# Patient Record
Sex: MALE | Race: WHITE | NOT HISPANIC OR LATINO | Employment: UNEMPLOYED | URBAN - METROPOLITAN AREA
[De-identification: names, ages, dates, MRNs, and addresses within clinical notes are randomized per-mention and may not be internally consistent; named-entity substitution may affect disease eponyms.]

---

## 2018-10-15 ENCOUNTER — TRANSFERRED RECORDS (OUTPATIENT)
Dept: HEALTH INFORMATION MANAGEMENT | Facility: CLINIC | Age: 28
End: 2018-10-15

## 2018-10-15 ENCOUNTER — TELEPHONE (OUTPATIENT)
Dept: BEHAVIORAL HEALTH | Facility: CLINIC | Age: 28
End: 2018-10-15

## 2018-10-16 ENCOUNTER — HOSPITAL ENCOUNTER (INPATIENT)
Facility: CLINIC | Age: 28
LOS: 3 days | Discharge: HOME | End: 2018-10-19
Attending: PSYCHIATRY & NEUROLOGY | Admitting: PSYCHIATRY & NEUROLOGY
Payer: COMMERCIAL

## 2018-10-16 DIAGNOSIS — F33.41 RECURRENT MAJOR DEPRESSIVE DISORDER, IN PARTIAL REMISSION (H): Primary | ICD-10-CM

## 2018-10-16 PROCEDURE — 12400007 ZZH R&B MH INTERMEDIATE UMMC

## 2018-10-16 RX ORDER — OLANZAPINE 10 MG/1
10 TABLET ORAL
Status: DISCONTINUED | OUTPATIENT
Start: 2018-10-16 | End: 2018-10-19 | Stop reason: HOSPADM

## 2018-10-16 RX ORDER — TRAZODONE HYDROCHLORIDE 50 MG/1
50 TABLET, FILM COATED ORAL
Status: DISCONTINUED | OUTPATIENT
Start: 2018-10-16 | End: 2018-10-19 | Stop reason: HOSPADM

## 2018-10-16 RX ORDER — HYDROXYZINE HYDROCHLORIDE 25 MG/1
25 TABLET, FILM COATED ORAL EVERY 4 HOURS PRN
Status: DISCONTINUED | OUTPATIENT
Start: 2018-10-16 | End: 2018-10-19 | Stop reason: HOSPADM

## 2018-10-16 RX ORDER — ACETAMINOPHEN 325 MG/1
650 TABLET ORAL EVERY 4 HOURS PRN
Status: DISCONTINUED | OUTPATIENT
Start: 2018-10-16 | End: 2018-10-19 | Stop reason: HOSPADM

## 2018-10-16 RX ORDER — POLYETHYLENE GLYCOL 3350 17 G
2-4 POWDER IN PACKET (EA) ORAL
Status: DISCONTINUED | OUTPATIENT
Start: 2018-10-16 | End: 2018-10-19 | Stop reason: HOSPADM

## 2018-10-16 RX ORDER — OLANZAPINE 10 MG/2ML
10 INJECTION, POWDER, FOR SOLUTION INTRAMUSCULAR
Status: DISCONTINUED | OUTPATIENT
Start: 2018-10-16 | End: 2018-10-19 | Stop reason: HOSPADM

## 2018-10-16 NOTE — IP AVS SNAPSHOT
[address]    [facility] [address]    Phone:  [phone]                                       After Visit Summary   10/16/2018    Terrance Fletcher    MRN: [id number]           After Visit Summary Signature Page     I have received my discharge instructions, and my questions have been answered. I have discussed any challenges I see with this plan with the nurse or doctor.    ..........................................................................................................................................  Patient/Patient Representative Signature      ..........................................................................................................................................  Patient Representative Print Name and Relationship to Patient    ..................................................               ................................................  Date                                   Time    ..........................................................................................................................................  Reviewed by Signature/Title    ...................................................              ..............................................  Date                                               Time          22EPIC Rev 08/18

## 2018-10-16 NOTE — TELEPHONE ENCOUNTER
S: pt is a 28 yr old male in [facility] ED for psychosis [phone]    B: Pt required Zyprexa in ED 10 mg.  Mom reports pt ha made suicidal statements and has been verbally aggressive.  Pt reports he was discharged from detox 2 days ago for meth use.  Mother reports SI and HI.  Last reported suicide attempt was June 2018 by CO2.  Pt reports he is paranoid and the [place] is after him. Pt reports he can see them and hear them.  Pt reports he is hearing car horns and clicking noises.  Pt reports thoughts of harming his wife.  Pt reports smelling awful smells.      A: health officer hold    R: pt waiting in ED for appropriate placement

## 2018-10-16 NOTE — IP AVS SNAPSHOT
MRN:[id number]                      After Visit Summary   10/16/2018    Terrance Fletcher    MRN: [id number]           Thank you!     Thank you for choosing [facility] for your care. Our goal is always to provide you with excellent care.        Patient Information     Date Of Birth          1990        About your hospital stay     You were admitted on:  October 16, 2018 You last received care in the:  [facility]    You were discharged on:  October 19, 2018       Who to Call     For medical emergencies, please call 911.  For non-urgent questions about your medical care, please call your primary care provider or clinic, None          Attending Provider     Provider Specialty    Tomer Mukherjee MD Psychiatry       Primary Care Provider Fax #    Physician No Ref-Primary [phone]      Your next 10 appointments already scheduled     Oct 23, 2018  9:40 AM CDT   Office Visit with [facility]VIRA [facility] [place] [place]    [address] [place] [address] [phone]           Bring a current list of meds and any records pertaining to this visit. For Physicals, please bring immunization records and any forms needing to be filled out. Please arrive 10 minutes early to complete paperwork.              Further instructions from your care team        Behavioral Discharge Planning and Instructions    Summary:  You were admitted on 10/16/2018 due to Psychotic Symptomology and Chemical Use Issues.  You were treated by Dr. Tomer Mukherjee MD and discharged on 10/19/18 from [facility] to home. You declined the need to stay hospitalized and have case management assist you with outpatient CD treatment, referrals to several locations are listed below. Please make your recovery a priority. You have been set up with a primary care to manage your medication.    Principal Diagnosis:   Psychosis, likely substance induced  Polysubstance use  disorder (methamphetamines, alcohol, cannabis)    Primary Provider for Medication Management: Tuesday 10/23/18 9:40am  [facility]  Larissa Goodwin  [address]  [facility] [address]  [phone]    Attend all scheduled appointments with your outpatient providers. Call at least 24 hours in advance if you need to reschedule an appointment to ensure continued access to your outpatient providers.     Outpatient CD Treatment Programs:  -[facility] in [place] (Immediate openings)  [phone]     -[facility] in [place]  [phone]    -[facility] in [place]  [phone]    -[facility] in [place]  [phone]    Major Treatments, Procedures and Findings:  You were provided with: a psychiatric assessment, assessed for medical stability, group therapy and milieu management    Symptoms to Report: Feeling more aggressive, increased confusion, losing more sleep, mood getting worse or thoughts of suicide    Early warning signs can include: Increased depression or anxiety sleep disturbances increased thoughts or behaviors of suicide or self-harm  increased unusual thinking, such as paranoia or hearing voices    Safety and Wellness:  Take all medicines as directed.  Make no changes unless your doctor suggests them.  Follow treatment recommendations.  Refrain from alcohol and non-prescribed drugs.  Items could include:  {Safety and Wellness Adult Senior:753953} If there is a concern for safety, call 911.    Resources:   Crisis Intervention: 863.303.9006 or 587-132-1353 (TTY: 200.392.8463).  Call anytime for help.  National [place] on Mental Illness (www.mn.sung.org): 717.490.3449 or 223-107-2983.  [place] Association for Children's Mental Health (www.macmh.org): 824.138.6433.  National Suicide Prevention Line (www.mentalhealthmn.org): 168-755-HGDJ (0984)  Mental Health Consumer/Survivor Network of [place] (www.mhcsn.net): 216.755.2314 or 907-205-0464  Mental Health Association of [place]  "(www.mentalhealth.org): 113.852.2924 or 239-630-7371    The treatment team has appreciated the opportunity to work with you.     If you have any questions or concerns our unit number is [phone]  You may be receiving a follow-up phone call within the next three days from a representative from behavioral health.          Pending Results     No orders found from 10/14/2018 to 10/17/2018.            Admission Information     Date & Time Provider Department Dept. Phone    10/16/2018 Tomer Mukherjee MD [facility] [phone]      Your Vitals Were     Blood Pressure Pulse Temperature Respirations Pulse Oximetry       141/87 (BP Location: Left arm) 80 98.2  F (36.8  C) (Oral) 16 96%       MyChart Information     [facility] lets you send messages to your doctor, view your test results, renew your prescriptions, schedule appointments and more. To sign up, go to [email or web address] . Click on \"Log in\" on the left side of the screen, which will take you to the Welcome page. Then click on \"Sign up Now\" on the right side of the page.     You will be asked to enter the access code listed below, as well as some personal information. Please follow the directions to create your username and password.     Your access code is: [email or web address]  Expires: 2019 12:11 PM     Your access code will  in 90 days. If you need help or a new code, please call your [facility] clinic or [phone].        Care EveryWhere ID     This is your Care EveryWhere ID. This could be used by other organizations to access your [facility] medical records  [id number]        Equal Access to Services     MANUEL CASILLAS : Kateryna Francois, celeste johnson, smooth valerio. So [facility] [phone].    ATENCIÓN: Si habla español, tiene a alvarez disposición servicios gratuitos de asistencia lingüística. Llame al [phone].    We comply with applicable federal civil rights laws and [place] " laws. We do not discriminate on the basis of race, color, national origin, age, disability, sex, sexual orientation, or gender identity.               Review of your medicines      START taking        Dose / Directions    mirtazapine 15 MG tablet   Commonly known as:  REMERON   Used for:  Recurrent major depressive disorder, in partial remission (H)        Dose:  15 mg   Take 1 tablet (15 mg) by mouth At Bedtime   Quantity:  30 tablet   Refills:  0            Where to get your medicines      These medications were sent to [facility] [place], [place] - [address]  [address] [facility] [address], [facility] [address]     Phone:  [phone]     mirtazapine 15 MG tablet                Protect others around you: Learn how to safely use, store and throw away your medicines at www.disposemymeds.org.             Medication List: This is a list of all your medications and when to take them. Check marks below indicate your daily home schedule. Keep this list as a reference.      Medications           Morning Afternoon Evening Bedtime As Needed    mirtazapine 15 MG tablet   Commonly known as:  REMERON   Take 1 tablet (15 mg) by mouth At Bedtime

## 2018-10-16 NOTE — PROGRESS NOTES
10/16/18 7665   Patient Belongings   Did you bring any home meds/supplements to the hospital?  No   Patient Belongings clothing   Disposition of Belongings Other (see comment)   Belongings Search Yes   Clothing Search Yes   Second Staff Lior      Patient locker-Pants (green), long sleeve shirt (green), short sleeve shirt (red), glasses, cigarettes, cell phone.     Security-[employer] Mastercard, [employer] 6265 Mastercard, $74 dollars.    A               Admission:  I am responsible for any personal items that are not sent to the safe or pharmacy.  [facility] is not responsible for loss, theft or damage of any property in my possession.    Signature:  _________________________________ Date: _______  Time: _____                                              Staff Signature:  ____________________________ Date: ________  Time: _____      2nd Staff person, if patient is unable/unwilling to sign:    Signature: ________________________________ Date: ________  Time: _____     Discharge:  [facility] has returned all of my personal belongings:    Signature: _________________________________ Date: ________  Time: _____                                          Staff Signature:  ____________________________ Date: ________  Time: _____

## 2018-10-16 NOTE — TELEPHONE ENCOUNTER
#20/ely accepted for himself               Ely requests pt be given zyprexa before he transfers; author left vm msg for [facility] staff to call intake back (no privates on #22)     Pt is on a Health officer hold; per Ely he is okay with this and if an emergency admit hold is needed, he said he or the residents can handle this; copy of health officer hold and clinical faxed to #20; per ely, he said he wants pt to get zyprexa before he transfers ONLY IF HE GETS AGITATED RE: THE TRANSFER, since he was last given zyprexa at 9:54 am per [facility] Rn. joe

## 2018-10-16 NOTE — H&P
"History and Physical    Terrance Fletcher MRN# [id number]   Age: 28 year old YOB: 1990     Date of Admission:  10/16/18        Primary Outpatient Psychiatrist: Unknown, per chart had \"started seeing a doctor for depression\"  Primary Physician:  No Ref-Primary, Physician  Therapist: Unknown  [facility] : None  Family Members: Cee (mom) - [phone]             Chief Complaint:   \"Sleep\"         History of Present Illness:   History obtained from patient interview, chart review.  Pt interviewed on 10/16/18 at approximately 5PM.    This patient is a 28 year old male with polysubstance use disorder (cannabis, methamphetamines), depression, and anxiety who presented to [facility] ED on 10/15/2018 with psychosis, paranoid thoughts, AH, VH, SI, and HI (possible thoughts of harming his wife) in the context of recently discharging from detox for methamphetamine use and recent methamphetamine use.    He was medically cleared for admission to inpatient psychiatric unit.    Per ED report: Pt presented to [facility] ED w/his mother due to increasing depression, intermittent SI, intermittent HI, and AH/VH in the context of discharging from detox 2 days ago for meth use. Pt reported last SI was a few days ago. Per mother, pt has made suicidal statements, such as \"he doesn't want to be here\" and \"this world is not for him.\" She also reports that the pt has been \"running around int he wood w/a crossbow, chasing people who are not there.\" Pt was paranoid and reported that people have been following him, and claims that this wife hired private investigators, and that the [place] is after him. He also reported HI (possibly of harming wife?), but wouldn't elaborate, other than that he was angry w/his wife after she was unfaithful. Pt also reported AH of car horns and clicking noises, as well as smelling awful smells. Pt reported daily meth use since June 2018 prior to detox, as well as heavy cannabis use. Pt " reported he was previously on Lexapro, anxiety medications, and Seroquel, but stopped them 2/2 meth use. The pt is currently in the process of a divorce, and his wedding anniversary was 10/16. He is also currently unable to see his son. Pt did require 10mg of Zyprexa in ED.      Per patient report:  Pt was interviewed in his room. He was sleeping in bed and said he just wanted to sleep and didn't want to answer questions. He did answer a few questions before ending the interview. He had been having AH primarily for the past week, only in the context of methamphetamine use. He has never had symptoms like this before. He last used meth 5 days ago, and he reports his last AH were 4-5 days ago. He reports he doesn't take any medications. He did not have any other physical concerns.    Per chart review: Recently seen in [facility] ED (10/11/18) in meth withdrawal. Reported that he started using 1 gram of meth per day after he went through a divorce and sold his home this past June. He was recently hospitalized for a suicide attempt (May 2018), and had started seeing a doctor for depression, but had been unable to keep his appointments and was off of his PTA medications. He was then transferred to [facility]. Pt has had two prior psychiatric hospitalizations at [facility]: 2011 for SI after relapse on substances; 2006 (was 16 y/o) for depressive symptoms, irritability, and outbursts.     The risks, benefits, alternatives and side effects have been discussed and are understood by the patient and other caregivers.       Psychiatric Review of Systems:   Depressive Sx: Patient declined to answer. SI per report.  Manic Sx: Patient declined to answer.  Psychosis: AH for past week. VH and paranoia per report.  Anxiety Sx: Patient declined to answer.  PTSD:Patient declined to answer.  ADHD: Patient declined to answer.  Antisocial: Patient declined to answer.  ASD: Patient declined to answer.  ED: Patient declined to answer.  Cluster  "B: Patient declined to answer.         Medical Review of Systems:   The Review of Systems is negative other than noted in the HPI         Psychiatric History:     Prior diagnoses: MDD, anxiety, methamphetamine dependence, alcohol dependence, Adjustment disorder with mixed emotions, Depressive disorder NOS, Anxiety disorder NOS, Substance abuse NOS, ADHD    Hospitalizations: May/June 2018 after suicide attempt, 2011 for SI after relapse on substances, 2006     Suicide attempts: Last reported suicide attempt was May/June 2018 by CO2    Self-injurious behavior: In remote past (per chart ) - cutting, hitting himself when angry    Violence: Per chart, hx of aggression    ECT/TMS: None per chart    Past medications: Celexa, Risperdal, trazodone, Ritalin, Concerta, Adderall, Strattera.  Most recently on \"Lexapro, anxiety medications, and Seroquel\" but stopped them 2/2 meth use.         Substance Use History:     Nicotine: Smokes 1/2 ppd.    Alcohol: Did not quantify use, last drink was \"awhile ago.\" Denies hx of alcohol withdrawal.     Cannabis: Current heavy use per chart.    Others: Methamphetamines - daily use since June 2018. Last use 4-5 days ago.    Prior CD treatments: Recently discharged from detox. 5 completed CD treatments, with the last in 2011.         Past Medical History:     Past Medical History:   Diagnosis Date     Poison ivy 3 wk ago     Substance abuse      Past Surgical History:   Procedure Laterality Date     COSMETIC SURGERY      to face   Psoriasis  Unknown is he has a history of seizures or head trauma.       Allergies:    No Known Allergies       Medications:   Pt is not on any PTA medications.          Social History:     Upbringing: Raised by mother and step-father, has three younger siblings.  Best friend committed suicide 10/2006 - patient was 16 yo, met his father for the first time at age 18.    Family/Relationships: Currently in the process of a divorce. Has a son - currently unable to see, " per report.    Living Situation: Unknown. Recently sold his house.    Education: HS grad per chart review.    Occupation: Unknown.    Legal: None reported in chart.    Abuse/Trauma: Unknown. Patient declined majority of interview.    : Unknown. Patient declined majority of interview.    Spirituality: Unknown. Patient declined majority of interview.         Family History:   Per chart review:  - Father: Alcohol use disorder  - Younger brother: ADHD  - Brother: Asperger's (autism)    Family History   Problem Relation Age of Onset     Diabetes Mother      type 1     Cancer - colorectal Father 40     onset in 40th            Labs:     No results found for this or any previous visit (from the past 24 hour(s)).         Psychiatric Examination:     There were no vitals taken for this visit.    Appearance:  poorly groomed, laying in bed, tattoos on arms  Attitude:  uncooperative  Eye Contact:  Eyes closed  Mood:  Appears tired and frustrated  Affect:  constricted mobility  Speech:  clear, coherent  Psychomotor Behavior:  no evidence of tardive dyskinesia, dystonia, or tics  Thought Process: Logical answers to questions.   Associations:  no loose associations  Thought Content: No current reported SI. Pt reports AH 4-5 days ago. Did not appear to be responding to internal stimuli.  Insight:  fair  Judgment:  limited  Oriented to: not formally assessed  Attention Span and Concentration:  intact  Recent and Remote Memory:  intact  Language:  english with appropriate syntax and vocabulary  Fund of Knowledge: appropriate  Muscle Strength and Tone: unable to assess, pt laying in bed  Gait and Station: unable to assess, pt laying in bed         Physical Exam:     Pt did not want to complete interview or physical exam with resident. Pt was laying in bed and did not appear to be in acute distress.    [facility] ED physical exam by Dr. Landon Coles on 10/15/2018 (in paper chart), copied below.  T: 98.1F, Pulse: 110, Respirations:  20, BP: 124/80, SpO2: 100%  Constitutional: Pt sitting up in bed, in no acute distress. The pt is lying in bed with his head covered by a pillow.  Head: Normocephalic. Atraumatic.  Eyes: Extra-ocular movements intact. Normal conjunctiva.  Oropharynx: Moist mucous membranes.  Neck: Supple.  CV: RRR. No murmurs appreciated.  Pulm: Easy work of breathing. Lungs are clear to auscultation bilaterally.  Abdomen: Soft, non-distended, non-tender.  MSK: No deformities of the extremities.  Skin: Warm, dry.   Neuro: Alert and interactive.  Psych: The pt is generally cooperative. Endorses remote SI. Appears depressed. Reports homicidal ideations, but will not elaborate. He believes that detectives are following him.          Assessment   This patient is a 28 year old male with a history of polysubstance use disorder (cannabis, methamphetamines), depression, and anxiety who presented to [facility] ED on 10/15/2018 with paranoid thoughts, AH, VH, SI, and HI (possible thoughts of harming his wife) in the context of recently discharging from detox for methamphetamine use and recent methamphetamine and cannabis use. Psychosocial stressors include divorce, family issues, and substance use, which he has been coping with by using cannabis and methamphetamines. Family history was notable for AUD in his father and siblings with ADHD and autism. MSE was notable for an uncooperative pt who reports last AH 4-5 days ago, only in the context of methamphetamine use.  AH, VH, and paranoia in the context of methamphetamine use is consistent with a diagnosis of substance induced psychosis. Additional collateral information and further interview when pt is agreeable will be important to assess presence of possible co-morbid mood disorders.    Given that patient was acutely psychotic w/AH, SI, and HI on presentation to the ED, he warrants inpatient hospitalization. Disposition pending clinical stabilization, medication optimization, and formulation  of safe discharge plan.          Plan   Admit to Unit [facility] with Attending Physician Dr. Mukherjee  Legal Status: Voluntary    Safety Assessment:      Pt has not required locked seclusion or restraints in the past 24 hours to maintain safety, please refer to RN documentation for further details.    Precautions: Suicide, Assault    Principal psychiatric diagnosis:   # Psychosis, likely substance induced    Secondary psychiatric diagnoses:   # Polysubstance use disorder (methamphetamines, alcohol, cannabis)    Medications:   Outpatient medications held:  None, pt not on PTA medications    Outpatient medications continued: None, pt not on PTA medications    New medications initiated:   - IM/PO Olanzapine 10mg Q2H PRN aggression/agitation  - hydroxyzine 25mg q4h prn for anxiety  - trazodone 50mg at bedtime prn for sleep    - Patient will be treated in therapeutic milieu with appropriate individual and group therapies.  - medications as above    Medical diagnoses:      #Hx of GERD: Not currently reporting symptoms.     #Hx of Psoriasis: Not currently reporting symptoms.    #Tobacco use:   - Nicotine replacement    Consult: None    Labs:  CBC w/diff, TSH, UA, CMP, folate, B12 - to be collected    OSH Labs:  Utox: negative  CBC w/diff: WBC 12.8 (H), otherwise wnl (RBC 5.69, Hgb 16.5, Plt 278)  BMP: CO2 34 (H), otherwise wnl (Cr 0.97)  EtOH: wnl         Dispo: unknown pending medication management and clinical stabilization    -------------------------------------------------------  Maddie Elliott MD  PGY-1 Resident  Pager: [phone]      Attestation:  10/17/18  Please see progress note 10/17/18.  Tomer Mukherjee MD

## 2018-10-17 LAB
ALBUMIN SERPL-MCNC: 3.6 G/DL (ref 3.4–5)
ALBUMIN UR-MCNC: NEGATIVE MG/DL
ALP SERPL-CCNC: 81 U/L (ref 40–150)
ALT SERPL W P-5'-P-CCNC: 31 U/L (ref 0–70)
APPEARANCE UR: CLEAR
AST SERPL W P-5'-P-CCNC: 14 U/L (ref 0–45)
BASOPHILS # BLD AUTO: 0.1 10E9/L (ref 0–0.2)
BASOPHILS NFR BLD AUTO: 0.8 %
BILIRUB DIRECT SERPL-MCNC: <0.1 MG/DL (ref 0–0.2)
BILIRUB SERPL-MCNC: 0.3 MG/DL (ref 0.2–1.3)
BILIRUB UR QL STRIP: NEGATIVE
COLOR UR AUTO: NORMAL
DIFFERENTIAL METHOD BLD: NORMAL
EOSINOPHIL # BLD AUTO: 0.4 10E9/L (ref 0–0.7)
EOSINOPHIL NFR BLD AUTO: 3.9 %
ERYTHROCYTE [DISTWIDTH] IN BLOOD BY AUTOMATED COUNT: 13.8 % (ref 10–15)
FOLATE SERPL-MCNC: 7.8 NG/ML
GLUCOSE UR STRIP-MCNC: NEGATIVE MG/DL
HCT VFR BLD AUTO: 49.7 % (ref 40–53)
HGB BLD-MCNC: 16 G/DL (ref 13.3–17.7)
HGB UR QL STRIP: NEGATIVE
IMM GRANULOCYTES # BLD: 0.1 10E9/L (ref 0–0.4)
IMM GRANULOCYTES NFR BLD: 1.2 %
KETONES UR STRIP-MCNC: NEGATIVE MG/DL
LEUKOCYTE ESTERASE UR QL STRIP: NEGATIVE
LYMPHOCYTES # BLD AUTO: 2.2 10E9/L (ref 0.8–5.3)
LYMPHOCYTES NFR BLD AUTO: 23.2 %
MCH RBC QN AUTO: 27.8 PG (ref 26.5–33)
MCHC RBC AUTO-ENTMCNC: 32.2 G/DL (ref 31.5–36.5)
MCV RBC AUTO: 86 FL (ref 78–100)
MONOCYTES # BLD AUTO: 0.7 10E9/L (ref 0–1.3)
MONOCYTES NFR BLD AUTO: 7.3 %
NEUTROPHILS # BLD AUTO: 6.1 10E9/L (ref 1.6–8.3)
NEUTROPHILS NFR BLD AUTO: 63.6 %
NITRATE UR QL: NEGATIVE
NRBC # BLD AUTO: 0 10*3/UL
NRBC BLD AUTO-RTO: 0 /100
PH UR STRIP: 6.5 PH (ref 5–7)
PLATELET # BLD AUTO: 250 10E9/L (ref 150–450)
PROT SERPL-MCNC: 7 G/DL (ref 6.8–8.8)
RBC # BLD AUTO: 5.75 10E12/L (ref 4.4–5.9)
SOURCE: NORMAL
SP GR UR STRIP: 1.02 (ref 1–1.03)
TSH SERPL DL<=0.005 MIU/L-ACNC: 1.16 MU/L (ref 0.4–4)
UROBILINOGEN UR STRIP-MCNC: NORMAL MG/DL (ref 0–2)
VIT B12 SERPL-MCNC: 422 PG/ML (ref 193–986)
WBC # BLD AUTO: 9.6 10E9/L (ref 4–11)

## 2018-10-17 PROCEDURE — 80076 HEPATIC FUNCTION PANEL: CPT | Performed by: PSYCHIATRY & NEUROLOGY

## 2018-10-17 PROCEDURE — 85025 COMPLETE CBC W/AUTO DIFF WBC: CPT | Performed by: PSYCHIATRY & NEUROLOGY

## 2018-10-17 PROCEDURE — 82746 ASSAY OF FOLIC ACID SERUM: CPT | Performed by: PSYCHIATRY & NEUROLOGY

## 2018-10-17 PROCEDURE — 36415 COLL VENOUS BLD VENIPUNCTURE: CPT | Performed by: PSYCHIATRY & NEUROLOGY

## 2018-10-17 PROCEDURE — 81003 URINALYSIS AUTO W/O SCOPE: CPT | Performed by: PSYCHIATRY & NEUROLOGY

## 2018-10-17 PROCEDURE — 82607 VITAMIN B-12: CPT | Performed by: PSYCHIATRY & NEUROLOGY

## 2018-10-17 PROCEDURE — 99222 1ST HOSP IP/OBS MODERATE 55: CPT | Mod: AI | Performed by: PSYCHIATRY & NEUROLOGY

## 2018-10-17 PROCEDURE — 84443 ASSAY THYROID STIM HORMONE: CPT | Performed by: PSYCHIATRY & NEUROLOGY

## 2018-10-17 PROCEDURE — 12400007 ZZH R&B MH INTERMEDIATE UMMC

## 2018-10-17 PROCEDURE — 25000132 ZZH RX MED GY IP 250 OP 250 PS 637

## 2018-10-17 RX ADMIN — NICOTINE POLACRILEX 2 MG: 2 LOZENGE ORAL at 17:17

## 2018-10-17 ASSESSMENT — ACTIVITIES OF DAILY LIVING (ADL)
LAUNDRY: WITH SUPERVISION
ORAL_HYGIENE: INDEPENDENT
GROOMING: INDEPENDENT
GROOMING: INDEPENDENT
ORAL_HYGIENE: INDEPENDENT
DRESS: INDEPENDENT
DRESS: INDEPENDENT

## 2018-10-17 NOTE — PLAN OF CARE
Problem: Patient Care Overview  Goal: Individualization & Mutuality  Patient's goals:  Unable to participate    Plan for admission:  1. Stabilization of mood disorder symptoms  2. Absence of SI- safe with self  3. Medication management per MD's  4. Safe withdrawal from substances complete  5. Coordination of care with outpatient providers, family  6. CD assessment complete  7. Psychiatric follow up care in place

## 2018-10-17 NOTE — PLAN OF CARE
Problem: Patient Care Overview  Goal: Team Discussion  Team Plan:   BEHAVIORAL TEAM DISCUSSION    Participants:   Dr. Mukherjee, Dr. Elliott, Dr. Dorsey, Claudine Goncalves RN, Jesika Timmons MA.LP, Med students    Continued Stay Criteria/Rationale:   Acute psychosis,  SI/HI    Medical/Physical:   H/O GERD, Psoriasis    Precautions:   Behavioral Orders   Procedures     Assault precautions     Code 1 - Restrict to Unit     Routine Programming     As clinically indicated     Status 15     Every 15 minutes.     Suicide precautions     Patients on Suicide Precautions should have a Combination Diet ordered that includes a Diet selection(s) AND a Behavioral Tray selection for Safe Tray - with utensils, or Safe Tray - NO utensils       Plan:  Patient will have ongoing psychiatric assessment.  Medications will be reviewed and adjusted per MD as indicated.  Family  will be contacted for collateral/ care coordination.  Patient will be offered assistance in arranging CD treatment if interested.  Hospital staff will provide a safe environment and a therapeutic milieu. Patient will be encouraged to participate in unit groups and activities.   CTC will continue to assess needs and  ensure appropriate follow up care is in place.       Rationale for change in precautions or plan:   No change in plan/precautions at this time

## 2018-10-17 NOTE — PROGRESS NOTES
Pt was brought in from [facility] ED for psychosis. Per report pt was making suicidal statements and was verbally aggressive. Pt was discharged from detox  2 days ago for meth use. Per report pt attempted suicide in the past (June 2018) by CO2. When pt arrived in the unit pt presented with flat blunted affect. Pt wanted to go to bed immediately after search. Writer attempted to check in with pt but pt said he wanted to sleep and will do the admission paperwork tomorrow. Pt denies SI/SIB and contracted for safety. Pt is voluntary. Pt has been sleeping since he got in the unit. Unable to complete admission profile due to pt refused.

## 2018-10-17 NOTE — PROGRESS NOTES
"    ----------------------------------------------------------------------------------------------------------  [facility], [place]   Psychiatric Progress Note  Hospital Day #1     Interim History:   The patient's care was discussed with the treatment team and chart notes were reviewed.    Sleep: 7 hours  Scheduled Medications: no scheduled medications   PRNs: none    Staff Report: Patient was transferred to the inpatient unit yesterday, 10/16 from the [facility] for psychosis in the context of recent discharge from detox for methamphetamine use and recent methamphetamine and cannabis use. He was noted to have a blunted affect and wanted to go to bed immediately after arrival. He denied SI/SIB and contracted for safety. He was uncooperative during interviews yesterday. Staff reported that patient has been unwilling to talk to certain people this morning, but did cooperate with having his vitals taken.     Patient Interview: Patient was sleeping when writer knocked on his door, and shouted \"no\" when asked if he would be willing to talk with his treatment team. Team later visited patient in his room, and patient stated \"I don't want to talk I just want to be left alone,\" so the interview was ended.        The risks, benefits, alternatives and side effects of any medication changes have been discussed and are understood by the patient and other caregivers.    Review of systems:     ROS was negative unless noted above.          Allergies:   No Known Allergies         Psychiatric Examination:   /82 (BP Location: Left arm)  Pulse 100  Temp 96.8  F (36  C) (Oral)  Resp 16  SpO2 96%   There is no height or weight on file to calculate BMI.    Appearance:  Fatigued and uncooperative, laying bed facing away from the door  Attitude:  uncooperative  Eye Contact:  poor , did not open his eyes when treatment team entered room   Mood:  declined to answer  Affect:  Appeared fatigued and " irritable  Speech:  clear, coherent  Psychomotor Behavior:  no gross abnormal movements, patient laying in bed under blanket  Thought Process:  unable to assess  Associations:  unable to assess  Thought Content:  unable to assess. Did not appear to be responding to internal stimuli.  Insight:  limited  Judgment:  poor  Oriented to:  not formally assessed  Attention Span and Concentration:  unable to assess  Recent and Remote Memory:  unable to assess  Language: speaks English clearly  Fund of Knowledge: unable to assess  Muscle Strength and Tone: unable to fully assess, patient laying bed under blanket  Gait and Station: unable to assess         Labs:     CBC, TSH, hepatic panel wnl  Folate, vitamin B12 pending  UA needs to be collected     Assessment    Diagnostic Impression: This patient is a 28 year old male with a history of polysubstance use disorder (cannabis, methamphetamines), depression, and anxiety who presented to [place] ED on 10/15/2018 with paranoid thoughts, AH, VH, SI, and HI (possible thoughts of harming his wife) in the context of recently discharging from detox for methamphetamine use and recent methamphetamine and cannabis use. Psychosocial stressors included divorce, family issues, and substance use, which he had been coping with by using cannabis and methamphetamines. Family history was notable for AUD in his father and siblings with ADHD and autism. MSE was notable for an uncooperative pt who reports last AH 4-5 days ago, only in the context of methamphetamine use.  AH, VH, and paranoia in the context of methamphetamine use is consistent with a diagnosis of substance induced psychosis. Additional collateral information and further interview when pt is agreeable will be important to assess presence of possible co-morbid mood disorders. Given that patient was acutely psychotic w/AH, SI, and HI on presentation to the ED, he warranted inpatient hospitalization.    Hospital course: Terrance Burgess  Chance was admitted to station 20 as a voluntary patient with Dr. Tomer Mukherjee on 10/16/18. Patient denied taking PTA medications of Lexapro and Seroquel, so these were not continued. PRN olanzapine was started for aggression/agitation, PRN hydroxyzine was started for anxiety, and PRN trazodone was started for sleep. Pt has not engaged in treatment thus far.    Discontinued Medications (& Rationale): None    Medical course: Patient was medically cleared for transfer to the inpatient unit. He has a history of GERD and psoriasis, but was not on PTA medications and did not report symptoms.     Plan   Principal Diagnosis:   # Psychosis, likely substance induced    Secondary psychiatric diagnoses of concern this admission:   # Polysubstance use disorder (methamphetamines, alcohol, cannabis)    Psychotropic Medications:  Modify:  None    Continue:  - IM/PO Olanzapine 10mg Q2H PRN aggression/agitation  - hydroxyzine 25mg q4h prn for anxiety  - trazodone 50mg at bedtime prn for sleep    Patient will be treated in therapeutic milieu with appropriate individual and group therapies as described.      Medical diagnoses to be addressed this admission:    # Hx of GERD: not currently reporting symptoms    # Hx of psoriasis: not currently reporting symptoms     # Tobacco use:  - Nicotine replacement    Data:   OSH Labs:  Utox: negative  CBC w/diff: WBC 12.8 (H), otherwise wnl (RBC 5.69, Hgb 16.5, Plt 278)  BMP: CO2 34 (H), otherwise wnl (Cr 0.97)  EtOH: wnl    CBC, TSH, hepatic panel wnl  Folate, vitamin B12 pending  UA needs to be collected    Consults: None    Legal Status: Voluntary    Safety Assessment:   Behavioral Orders   Procedures     Assault precautions     Code 1 - Restrict to Unit     Routine Programming     As clinically indicated     Status 15     Every 15 minutes.     Suicide precautions     Patients on Suicide Precautions should have a Combination Diet ordered that includes a Diet selection(s) AND a Behavioral Tray  selection for Safe Tray - with utensils, or Safe Tray - NO utensils         Disposition: unknown pending medication management and clinical stabilization. Would likely benefit from CD treatment.     Note scribed by Jackeline Ziegler, medical student. Patient seen and discussed with resident and staff.     I was present with the medical student who participated in the service and documentation of the note. I have verified the history and personally performed the physical/mental status exam and medical decision making. I agree with the assessment and plan documented in the note.    Maddie Elliott MD  PGY-1 Resident  Pager: [phone]    Attestation:  I have reviewed the admit note and discussed the case with Dr Elliott.  The patient was irritable and refused interview.  This is likely stimulant withdrawal.  Tomer Mukherjee MD

## 2018-10-17 NOTE — PROGRESS NOTES
Pt was sitting in lounge for few minutes this shift otherwise pt has beein laying in bed.  Asked pt if he would complete admission paperwork.  Pt refused.  Asked pt if he has any SI thought pt said no.  Pt appears agitated.

## 2018-10-17 NOTE — PROGRESS NOTES
"INITIAL PSYCHOSOCIAL ASSESSMENT   I have reviewed the chart met with the patient, and developed Care Plan.  Information for assessment was obtained from: Patient/patient chart    Presenting Problem:   The patient is a 28 year old male with polysubstance use disorder (cannabis, methamphetamines), depression, and anxiety who presented to [facility] ED on 10/15/2018 with psychosis, paranoid thoughts, AH, VH, SI, and HI (possible thoughts of harming his wife).  Patient recently discharged from detox for methamphetamine use.  Per mother, pt has made suicidal statements, such as \"he doesn't want to be here\" and \"this world is not for him.\" She also reports that the pt has been \"running around int he wood w/a crossbow, chasing people who are not there.\" Pt was paranoid and reported that people have been following him, and claims that this wife hired private investigators, and that the [place] is after him.  Pt also reported AH of car horns and clicking noises, as well as smelling awful smells. Pt reported daily meth use since June 2018 prior to detox, as well as heavy cannabis use    The following areas have been assessed:  History of Mental Health and Chemical Dependency:  Patient has had several past psych admissions- including 2006, 2011 and 5/2018.   Patient has a h/o suicide attempt - most recently in 5/2018 via CO2 poisoning.    Patient has a h/o methamphetamine, marijuana and alcohol dependence.  He has had multiple detox admissions and undergone CD treatment x5- most recently 2011.      Family Description (Constellation, Family Psychiatric History):   Patient was born/raised in [place].  He is the eldest of 4 sons.  Patient was raised by mother and step father.  Patient met his father for the first time at age 18.    Patient is currently going through a divorce.  Has 1 son whom he is unable to see.    Family history is significant for father with alcohol use disorder, 1 brother with ADHD, 1 brother with " autism    Significant Life Events (Illness, Abuse, Trauma, Death):    Living Situation:     Patient has been living with mom    Educational Background:   HS graduate    Occupational History:     Financial Status:    Ongoing financial stress    Legal Issues:    Patient has a lengthy arrest history including multiple charges for underage drinking, domestic assault 2010 x2, 2011, Possession 2011, 2012, 2015 x2, DWI 2012    Ethnic/Cultural Issues:  No concerns identified    Spiritual Orientation:    Anglican                       Service History:   N/A    Social Functioning (organization, interests):  Limited 2' ongoing substance use                                                     Current Treatment Providers are:  None    Social Service Assessment/Plan:  Patient will have ongoing psychiatric assessment.  Medications will be reviewed and adjusted per MD as indicated.   Family will be contacted for care coordination.  Hospital staff will provide a safe environment and a therapeutic milieu. Patient will be encouraged to participate in unit groups and activities.   CTC will continue to assess needs and  ensure appropriate follow up care is in place.

## 2018-10-18 PROCEDURE — 99232 SBSQ HOSP IP/OBS MODERATE 35: CPT | Mod: GC | Performed by: PSYCHIATRY & NEUROLOGY

## 2018-10-18 PROCEDURE — G0177 OPPS/PHP; TRAIN & EDUC SERV: HCPCS

## 2018-10-18 PROCEDURE — 12400007 ZZH R&B MH INTERMEDIATE UMMC

## 2018-10-18 PROCEDURE — 25000132 ZZH RX MED GY IP 250 OP 250 PS 637

## 2018-10-18 RX ADMIN — TRAZODONE HYDROCHLORIDE 50 MG: 50 TABLET ORAL at 22:18

## 2018-10-18 RX ADMIN — NICOTINE POLACRILEX 4 MG: 2 LOZENGE ORAL at 14:10

## 2018-10-18 RX ADMIN — NICOTINE POLACRILEX 4 MG: 2 LOZENGE ORAL at 22:18

## 2018-10-18 RX ADMIN — NICOTINE POLACRILEX 2 MG: 2 LOZENGE ORAL at 15:34

## 2018-10-18 RX ADMIN — NICOTINE POLACRILEX 4 MG: 2 LOZENGE ORAL at 19:36

## 2018-10-18 NOTE — PROGRESS NOTES
10/18/18 1417   Behavioral Health   Hallucinations denies / not responding to hallucinations   Thinking distractable   Orientation place: oriented   Insight poor   Judgement impaired   Eye Contact at examiner   Affect other (see comments)  (neutral)   Mood mood is calm   Physical Appearance/Attire attire appropriate to age and situation   Suicidality other (see comments)  (denies)   1. Wish to be Dead No   2. Non-Specific Active Suicidal Thoughts  No   Self Injury (None observed or reported)   Elopement Statements about wanting to leave   Activity isolative;withdrawn   Speech clear   Psychomotor / Gait balanced     Pt has been resting in his room for most of the shift. Out on the milieu after lunch. Affect looked neutral. Calm and controlled behavior. Pt had to be redirected in the morning after making a statement about wanting to hurt somebody. Pt did listen to re-direction, and went back to his room. His goal is to fill out his menu. Denies thoughts to hurt himself or others. Contracts for safety.

## 2018-10-18 NOTE — PROGRESS NOTES
10/17/18 2100   Behavioral Health   Hallucinations denies / not responding to hallucinations   Thinking distractable   Orientation person: oriented;place: oriented   Memory baseline memory   Insight poor   Judgement impaired   Eye Contact at examiner   Affect blunted, flat   Mood mood is calm   Physical Appearance/Attire neat   Hygiene well groomed   Suicidality other (see comments)  (dewnies )   1. Wish to be Dead No   2. Non-Specific Active Suicidal Thoughts  No   Self Injury other (see comment)  (denies \)   Activity other (see comment)  (visible in the milieu )   Speech clear;coherent   Activities of Daily Living   Hygiene/Grooming independent   Oral Hygiene independent   Dress independent   Laundry with supervision   Room Organization independent       Pt denied SI and SIB.  Pt seems calm, visible in the milieu and ate supper.

## 2018-10-18 NOTE — PROGRESS NOTES
"    ----------------------------------------------------------------------------------------------------------  [facility], [place]   Psychiatric Progress Note  Hospital Day #2     Interim History:   The patient's care was discussed with the treatment team and chart notes were reviewed.    Sleep: 7 hours  Scheduled Medications: no scheduled medications   PRNs: none    Staff Report: Patient refused to complete his admission paperwork yesterday, and was noted to appear agitated when sitting in the lounge for a few minutes yesterday afternoon. He denied SI and SIB, and was visible in milieu eating dinner yesterday evening. He otherwise has been laying in bed.     Patient Interview: Patient was interviewed in his room. He became angry when asked by writer if he would be willing to talk with the treatment team, and shouted that there were \"too many people in the room.\" After a few people left, he reported that he was interested in treatment. He stated that he has been eating without difficulties. He was not interested in discussing anything further, so the interview was ended. Pt was very irritable.    Collateral info: Resident spoke on the phone w/pt's mother yesterday afternoon, who declined to share additional collateral information about pt's presentation. Resident encouraged pt's mother to call back if she would like to speak further with treatment team.    The risks, benefits, alternatives and side effects of any medication changes have been discussed and are understood by the patient and other caregivers.    Review of systems:     ROS was negative unless noted above.          Allergies:   No Known Allergies         Psychiatric Examination:   /82 (BP Location: Left arm)  Pulse 100  Temp 96.8  F (36  C) (Oral)  Resp 16  SpO2 96%   There is no height or weight on file to calculate BMI.    Appearance:  Laying in bed   Attitude:  Somewhat uncooperative, irritable  Eye Contact:  " poor  Mood:  declined to answer  Affect:  Appeared fatigued and irritable  Speech:  clear, coherent  Psychomotor Behavior:  no gross abnormal movements, patient laying in bed under blanket  Thought Process: logical, linear  Associations:  No loose associations  Thought Content: Did not appear to be responding to internal stimuli.  Insight:  Limited, improving slightly   Judgment:  limited  Oriented to:  not formally assessed  Attention Span and Concentration:  intact  Recent and Remote Memory:  intact  Language: speaks English clearly  Fund of Knowledge: appropriate  Muscle Strength and Tone: unable to fully assess, patient laying bed under blanket  Gait and Station: unable to assess         Labs:     UA, folate, vitamin B12 wnl     Assessment    Diagnostic Impression: This patient is a 28 year old male with a history of polysubstance use disorder (cannabis, methamphetamines), depression, and anxiety who presented to [place] ED on 10/15/2018 with paranoid thoughts, AH, VH, SI, and HI (possible thoughts of harming his wife) in the context of recently discharging from detox for methamphetamine use and recent methamphetamine and cannabis use. Psychosocial stressors included divorce, family issues, and substance use, which he had been coping with by using cannabis and methamphetamines. Family history was notable for AUD in his father and siblings with ADHD and autism. MSE was notable for an uncooperative pt who reports last AH 4-5 days ago, only in the context of methamphetamine use.  AH, VH, and paranoia in the context of methamphetamine use is consistent with a diagnosis of substance induced psychosis. Additional collateral information and further interview when pt is agreeable will be important to assess presence of possible co-morbid mood disorders. Given that patient was acutely psychotic w/AH, SI, and HI on presentation to the ED, he warranted inpatient hospitalization.    Hospital course: Terrance Fletcher was  admitted to station 20 as a voluntary patient with Dr. Tmoer Mukherjee on 10/16/18. Patient denied taking PTA medications of Lexapro and Seroquel, so these were not continued. PRN olanzapine was started for aggression/agitation, PRN hydroxyzine was started for anxiety, and PRN trazodone was started for sleep. Pt stated he wants to get treatment, but has not engaged further with the treatment team thus far.     Discontinued Medications (& Rationale): None    Medical course: Patient was medically cleared for transfer to the inpatient unit. He has a history of GERD and psoriasis, but was not on PTA medications and did not report symptoms.     Plan   Principal Diagnosis:   # Psychosis, likely substance induced    Secondary psychiatric diagnoses of concern this admission:   # Polysubstance use disorder (methamphetamines, alcohol, cannabis)    Psychotropic Medications:  Modify:  None    Continue:  - IM/PO Olanzapine 10mg Q2H PRN aggression/agitation  - hydroxyzine 25mg q4h prn for anxiety  - trazodone 50mg at bedtime prn for sleep    Patient will be treated in therapeutic milieu with appropriate individual and group therapies as described.      Medical diagnoses to be addressed this admission:    # Hx of GERD: not currently reporting symptoms    # Hx of psoriasis: not currently reporting symptoms     # Tobacco use:  - Nicotine replacement    Data:   OSH Labs:  Utox: negative  CBC w/diff: WBC 12.8 (H), otherwise wnl (RBC 5.69, Hgb 16.5, Plt 278)  BMP: CO2 34 (H), otherwise wnl (Cr 0.97)  EtOH: wnl    CBC, TSH, hepatic panel, UA, folate, vitamin B12 wnl    Consults: None    Legal Status: Voluntary    Safety Assessment:   Behavioral Orders   Procedures     Assault precautions     Code 1 - Restrict to Unit     Routine Programming     As clinically indicated     Status 15     Every 15 minutes.     Suicide precautions     Patients on Suicide Precautions should have a Combination Diet ordered that includes a Diet selection(s) AND  a Behavioral Tray selection for Safe Tray - with utensils, or Safe Tray - NO utensils         Disposition: unknown pending medication management and clinical stabilization. Would likely benefit from CD treatment.     Note scribed by Jackeline Ziegler, medical student. Patient seen and discussed with resident and staff.     I was present with the medical student who participated in the service and documentation of the note. I have verified the history and personally performed the physical/mental status exam and medical decision making. I agree with the assessment and plan documented in the note.    Maddie Elliott MD  PGY-1 Resident  Pager: [phone]    Attestation:    I, Tomer Mukherjee, saw and evaluated the patient with the resident physician.  I agree with the findings and plan of care as documented in the resident note.  I have reviewed all labs and vital signs.

## 2018-10-19 VITALS
RESPIRATION RATE: 16 BRPM | DIASTOLIC BLOOD PRESSURE: 87 MMHG | SYSTOLIC BLOOD PRESSURE: 141 MMHG | HEART RATE: 80 BPM | TEMPERATURE: 98.2 F | OXYGEN SATURATION: 96 %

## 2018-10-19 PROCEDURE — G0177 OPPS/PHP; TRAIN & EDUC SERV: HCPCS

## 2018-10-19 PROCEDURE — 25000132 ZZH RX MED GY IP 250 OP 250 PS 637

## 2018-10-19 PROCEDURE — 99238 HOSP IP/OBS DSCHRG MGMT 30/<: CPT | Mod: GC | Performed by: PSYCHIATRY & NEUROLOGY

## 2018-10-19 RX ORDER — MIRTAZAPINE 15 MG/1
15 TABLET, FILM COATED ORAL AT BEDTIME
Qty: 30 TABLET | Refills: 0 | Status: SHIPPED | OUTPATIENT
Start: 2018-10-19 | End: 2021-06-24

## 2018-10-19 RX ORDER — MIRTAZAPINE 15 MG/1
15 TABLET, FILM COATED ORAL AT BEDTIME
Status: DISCONTINUED | OUTPATIENT
Start: 2018-10-19 | End: 2018-10-19 | Stop reason: HOSPADM

## 2018-10-19 RX ADMIN — NICOTINE POLACRILEX 4 MG: 2 LOZENGE ORAL at 11:22

## 2018-10-19 RX ADMIN — NICOTINE POLACRILEX 4 MG: 2 LOZENGE ORAL at 07:46

## 2018-10-19 ASSESSMENT — ACTIVITIES OF DAILY LIVING (ADL)
DRESS: SCRUBS (BEHAVIORAL HEALTH)
ORAL_HYGIENE: INDEPENDENT
GROOMING: INDEPENDENT

## 2018-10-19 NOTE — DISCHARGE SUMMARY
"    Psychiatric Discharge Summary    Hospital Day #3    Terrance Fletcher MRN# [id number]   Age: 28 year old YOB: 1990     Date of Admission:  10/16/2018  Date of Discharge:  10/19/2018  Admitting Physician:  Tomer Mukherjee MD  Discharge Physician:  Tomer Mukherjee MD         Event Leading to Hospitalization:   HPI from 10/16/18 H&P    \"This patient is a 28 year old male with polysubstance use disorder (cannabis, methamphetamines), depression, and anxiety who presented to [facility] ED on 10/15/2018 with psychosis, paranoid thoughts, AH, VH, SI, and HI (possible thoughts of harming his wife) in the context of recently discharging from detox for methamphetamine use and recent methamphetamine use.     He was medically cleared for admission to inpatient psychiatric unit.     Per ED report: Pt presented to [facility] ED w/his mother due to increasing depression, intermittent SI, intermittent HI, and AH/VH in the context of discharging from detox 2 days ago for meth use. Pt reported last SI was a few days ago. Per mother, pt has made suicidal statements, such as \"he doesn't want to be here\" and \"this world is not for him.\" She also reports that the pt has been \"running around int he wood w/a crossbow, chasing people who are not there.\" Pt was paranoid and reported that people have been following him, and claims that this wife hired private investigators, and that the [employer] is after him. He also reported HI (possibly of harming wife?), but wouldn't elaborate, other than that he was angry w/his wife after she was unfaithful. Pt also reported AH of car horns and clicking noises, as well as smelling awful smells. Pt reported daily meth use since June 2018 prior to detox, as well as heavy cannabis use. Pt reported he was previously on Lexapro, anxiety medications, and Seroquel, but stopped them 2/2 meth use. The pt is currently in the process of a divorce, and his wedding anniversary was 10/16. He is also " "currently unable to see his son. Pt did require 10mg of Zyprexa in ED.       Per patient report:  Pt was interviewed in his room. He was sleeping in bed and said he just wanted to sleep and didn't want to answer questions. He did answer a few questions before ending the interview. He had been having AH primarily for the past week, only in the context of methamphetamine use. He has never had symptoms like this before. He last used meth 5 days ago, and he reports his last AH were 4-5 days ago. He reports he doesn't take any medications. He did not have any other physical concerns.     Per chart review: Recently seen in [facility] ED (10/11/18) in meth withdrawal. Reported that he started using 1 gram of meth per day after he went through a divorce and sold his home this past June. He was recently hospitalized for a suicide attempt (May 2018), and had started seeing a doctor for depression, but had been unable to keep his appointments and was off of his PTA medications. He was then transferred to [facility]. Pt has had two prior psychiatric hospitalizations at [facility]: 2011 for SI after relapse on substances; 2006 (was 16 y/o) for depressive symptoms, irritability, and outbursts.\"       See Admission note by Tomer Mukherjee MD on 10/16/18 for additional details.          Diagnoses:   1. Substance induced psychosis  2. Polysubstance use disorder (methamphetamines, alcohol, cannabis)  3. R/o MDD         Consults:     None         Hospital Course:   Diagnostic Impression: This patient is a 28 year old male with a history of polysubstance use disorder (cannabis, methamphetamines), depression, and anxiety who presented to [place] ED on 10/15/2018 with paranoid thoughts, AH, VH, SI, and HI (possible thoughts of harming his wife) in the context of recently discharging from detox for methamphetamine use and recent methamphetamine and cannabis use. Psychosocial stressors included divorce, family issues, and substance " use, which he had been coping with by using cannabis and methamphetamines. Family history was notable for AUD in his father and siblings with ADHD and autism. MSE was notable for an uncooperative pt who reports last AH 4-5 days ago, only in the context of methamphetamine use.  AH, VH, and paranoia in the context of methamphetamine use was consistent with a diagnosis of substance induced psychosis. Additional collateral information and further interview when pt is agreeable will be important to assess presence of possible co-morbid mood disorders. Given that patient was acutely psychotic w/AH, SI, and HI on presentation to the ED, he warranted inpatient hospitalization.     Hospital course: Terrance Fletcher was admitted to station 20 as a voluntary patient with Dr. Tomer Mukherjee on 10/16/18. Patient denied taking PTA medications of Lexapro and Seroquel, so these were not continued. PRN olanzapine was started for aggression/agitation, PRN hydroxyzine was started for anxiety, and PRN trazodone was started for sleep. Pt slept and stayed in his room for the majority of his stay. On the day of discharge, he was engaged in care and had improved mood, with resolution of SI, HI, irritability, AH/VH, and paranoia with continued sobriety. He also reported that he tends to get depressed and was interested in starting a medication, so mirtazapine was started on the day of discharge for prior mood symptoms. He asked for resources for CD treatment, but was not interested in staying hospitalized for coordination of these resources. He was discharged with outpatient follow-up and provision of CD treatment resources.     Discontinued Medications (& Rationale): None    Terrance Fletcher was discharged to home (his trailer/RV) with outpatient follow-up and CD treatment resources. At the time of discharge Terrance Fletcher was determined to not be a danger to himself or others.     Medical course: Patient was medically cleared for  "transfer to the inpatient unit. He had a history of GERD and psoriasis, but was not on PTA medications and did not report symptoms. He did not have any further medical concerns.    Risk Assessment:  Terrance Fletcher has notable risk factors for self-harm, including prior psychosis, prior substance abuse, and previous suicide attempts. However, risk is mitigated by current sobriety. Additional steps taken to minimize risk include: coordinating outpatient follow-up and provision of crisis resources. Therefore, based on all available evidence including the factors cited above, Terrance Fletcher does not appear to be at imminent risk for self-harm, and is appropriate for outpatient level of care.     This document serves as a transfer of care to Terrance Fletcher's outpatient providers.         Discharge Medications:   - Mirtazapine (Remeron) 15mg by mouth at bedtime          Psychiatric Examination:   Appearance:  awake, alert and adequately groomed, tattoos present  Attitude:  cooperative  Eye Contact:  good  Mood: \"great\"  Affect:  appropriate and in normal range and mood congruent  Speech:  clear, coherent  Psychomotor Behavior:  no evidence of tardive dyskinesia, dystonia, or tics  Thought Process:  logical, linear and goal oriented  Associations:  no loose associations  Thought Content: no SI/HI, no AH/VH  Insight:  fair  Judgment:  fair  Oriented to:  Not formally assessed  Attention Span and Concentration:  intact  Recent and Remote Memory:  intact  Language: speaks English clearly and coherently  Fund of Knowledge: appropriate  Muscle Strength and Tone: grossly normal  Gait and Station: Normal         Discharge Plan:   Primary Provider for Medication Management: Tuesday 10/23/18 9:40am  [facility]  Larissa Goodwin  [address] [place] [address] [phone]     Attend all scheduled appointments with your outpatient providers. Call at least 24 hours in advance if you need to " reschedule an appointment to ensure continued access to your outpatient providers.      Outpatient CD Treatment Programs:  -[facility] in [place] (Immediate openings)  [phone]      -[facility] in [place]  [phone]     -[facility] in [place]  [phone]     -[facility] in [place]  [phone]       Pt seen and discussed with my attending, MD Maddie Montenegro MD  PGY-1 Resident  Pager: [phone]      Attestation:  I, Tomer Mukherjee, saw and evaluated the patient with the resident physician.  I agree with the findings and plan of care as documented in the resident note.  I have reviewed all labs and vital signs.  I, Tomer Mukherjee, have reviewed this summary and agree with the findings and discharge plan as written.              Appendix A: All Labs This Admission:     Results for orders placed or performed during the hospital encounter of 10/16/18   UA reflex to Microscopic and Culture   Result Value Ref Range    Color Urine Light Yellow     Appearance Urine Clear     Glucose Urine Negative NEG^Negative mg/dL    Bilirubin Urine Negative NEG^Negative    Ketones Urine Negative NEG^Negative mg/dL    Specific Gravity Urine 1.017 1.003 - 1.035    Blood Urine Negative NEG^Negative    pH Urine 6.5 5.0 - 7.0 pH    Protein Albumin Urine Negative NEG^Negative mg/dL    Urobilinogen mg/dL Normal 0.0 - 2.0 mg/dL    Nitrite Urine Negative NEG^Negative    Leukocyte Esterase Urine Negative NEG^Negative    Source Urine    Hepatic panel   Result Value Ref Range    Bilirubin Direct <0.1 0.0 - 0.2 mg/dL    Bilirubin Total 0.3 0.2 - 1.3 mg/dL    Albumin 3.6 3.4 - 5.0 g/dL    Protein Total 7.0 6.8 - 8.8 g/dL    Alkaline Phosphatase 81 40 - 150 U/L    ALT 31 0 - 70 U/L    AST 14 0 - 45 U/L   TSH with free T4 reflex and/or T3 as indicated   Result Value Ref Range    TSH 1.16 0.40 - 4.00 mU/L   Vitamin B12   Result Value Ref Range    Vitamin B12 422 193 - 986 pg/mL   Folate   Result Value Ref Range     Folate 7.8 >5.4 ng/mL   CBC with platelets differential   Result Value Ref Range    WBC 9.6 4.0 - 11.0 10e9/L    RBC Count 5.75 4.4 - 5.9 10e12/L    Hemoglobin 16.0 13.3 - 17.7 g/dL    Hematocrit 49.7 40.0 - 53.0 %    MCV 86 78 - 100 fl    MCH 27.8 26.5 - 33.0 pg    MCHC 32.2 31.5 - 36.5 g/dL    RDW 13.8 10.0 - 15.0 %    Platelet Count 250 150 - 450 10e9/L    Diff Method Automated Method     % Neutrophils 63.6 %    % Lymphocytes 23.2 %    % Monocytes 7.3 %    % Eosinophils 3.9 %    % Basophils 0.8 %    % Immature Granulocytes 1.2 %    Nucleated RBCs 0 0 /100    Absolute Neutrophil 6.1 1.6 - 8.3 10e9/L    Absolute Lymphocytes 2.2 0.8 - 5.3 10e9/L    Absolute Monocytes 0.7 0.0 - 1.3 10e9/L    Absolute Eosinophils 0.4 0.0 - 0.7 10e9/L    Absolute Basophils 0.1 0.0 - 0.2 10e9/L    Abs Immature Granulocytes 0.1 0 - 0.4 10e9/L    Absolute Nucleated RBC 0.0

## 2018-10-19 NOTE — ACP (ADVANCE CARE PLANNING)
Pt's mood has improved greatly in the last day.  Affect has been bright and spontaneous.  Pt attended community meeting, and has also been joking and jovial with peers in milieu.

## 2018-10-19 NOTE — PLAN OF CARE
Problem: Occupational Therapy Goals (Adult)  Goal: Occupational Therapy Goals  Stand Alone Therapy Goal    Terrance  attended a process group on the topic of kindness. Affect sad, restricted. Thoughtful comments. Heartily engaged in the discussion.

## 2018-10-19 NOTE — PROGRESS NOTES
Up and about in [facility] [place] [place]. Participated in groups. Denies suicidal ideations and self harm thoughts.  Report ready and agreeable to discharge.  Given verbal and written discharge instructions. Received valuables from security , belongings from unit locker and take home medications from outpatient pharmacy.  Verbalizes understanding of medication and discharge instructions.  Discharged home at 1445 pm, reports mother will provide transportation home

## 2018-10-19 NOTE — DISCHARGE INSTRUCTIONS
Behavioral Discharge Planning and Instructions    Summary:  You were admitted on 10/16/2018 due to Psychotic Symptomology and Chemical Use Issues.  You were treated by Dr. Tomer Mukherjee MD and discharged on 10/19/18 from [facility] to home. You declined the need to stay hospitalized and have case management assist you with outpatient CD treatment, referrals to several locations are listed below. Please make your recovery a priority. You have been set up with a primary care to manage your medication.    Principal Diagnosis:   Psychosis, likely substance induced  Polysubstance use disorder (methamphetamines, alcohol, cannabis)    Primary Provider for Medication Management: Tuesday 10/23/18 9:40am  [facility]  [address]  [facility] [address] [phone]    Attend all scheduled appointments with your outpatient providers. Call at least 24 hours in advance if you need to reschedule an appointment to ensure continued access to your outpatient providers.     Outpatient CD Treatment Programs:  -[facility] in [place] (Immediate openings)  [phone]     -[facility] in [place]  [phone]    -[facility] in [place]  [phone]    -[facility] in [place]  [phone]    Major Treatments, Procedures and Findings:  You were provided with: a psychiatric assessment, assessed for medical stability, group therapy and milieu management    Symptoms to Report: Feeling more aggressive, increased confusion, losing more sleep, mood getting worse or thoughts of suicide    Early warning signs can include: Increased depression or anxiety sleep disturbances increased thoughts or behaviors of suicide or self-harm  increased unusual thinking, such as paranoia or hearing voices    Safety and Wellness:  Take all medicines as directed.  Make no changes unless your doctor suggests them.  Follow treatment recommendations.  Refrain from alcohol and non-prescribed drugs.  Items  could include:  {Safety and Wellness Adult Senior:167616} If there is a concern for safety, call 911.    Resources:   Crisis Intervention: 121.523.7128 or 226-942-7985 (TTY: 838.465.4343).  Call anytime for help.  National [place] on Mental Illness (www.mn.sung.org): 226.470.8429 or 287-338-6621.  [place] Association for Children's Mental Health (www.macmh.org): 480.416.2270.  National Suicide Prevention Line (www.mentalhealthmn.org): 548-502-XZET (5174)  Mental Health Consumer/Survivor Network of [place] (www.mhcsn.net): 413.271.8287 or 880-044-6320  Mental Health Association of [place] (www.mentalhealth.org): 714.926.2639 or 347-593-5711    The treatment team has appreciated the opportunity to work with you.     If you have any questions or concerns our unit number is [phone]  You may be receiving a follow-up phone call within the next three days from a representative from behavioral health.

## 2019-07-18 ENCOUNTER — MEDICAL CORRESPONDENCE (OUTPATIENT)
Dept: HEALTH INFORMATION MANAGEMENT | Facility: CLINIC | Age: 29
End: 2019-07-18

## 2021-01-09 ENCOUNTER — HOSPITAL ENCOUNTER (EMERGENCY)
Facility: CLINIC | Age: 31
Discharge: HOME | End: 2021-01-10
Attending: EMERGENCY MEDICINE | Admitting: EMERGENCY MEDICINE
Payer: MEDICAID

## 2021-01-09 DIAGNOSIS — F15.10 METHAMPHETAMINE ABUSE (H): ICD-10-CM

## 2021-01-09 DIAGNOSIS — F29 PSYCHOSIS, UNSPECIFIED PSYCHOSIS TYPE (H): ICD-10-CM

## 2021-01-09 DIAGNOSIS — Z76.5 MALINGERING: ICD-10-CM

## 2021-01-09 LAB
AMPHETAMINES UR QL SCN: POSITIVE
BARBITURATES UR QL: NEGATIVE
BENZODIAZ UR QL: NEGATIVE
CANNABINOIDS UR QL SCN: POSITIVE
COCAINE UR QL: NEGATIVE
OPIATES UR QL SCN: NEGATIVE
PCP UR QL SCN: NEGATIVE

## 2021-01-09 PROCEDURE — 250N000013 HC RX MED GY IP 250 OP 250 PS 637: Performed by: EMERGENCY MEDICINE

## 2021-01-09 PROCEDURE — 99285 EMERGENCY DEPT VISIT HI MDM: CPT

## 2021-01-09 PROCEDURE — 80307 DRUG TEST PRSMV CHEM ANLYZR: CPT | Performed by: EMERGENCY MEDICINE

## 2021-01-09 RX ORDER — OLANZAPINE 10 MG/1
10 TABLET, ORALLY DISINTEGRATING ORAL 2 TIMES DAILY PRN
Status: DISCONTINUED | OUTPATIENT
Start: 2021-01-09 | End: 2021-01-10 | Stop reason: HOSPADM

## 2021-01-09 RX ORDER — OLANZAPINE 10 MG/1
10 TABLET, ORALLY DISINTEGRATING ORAL ONCE
Status: COMPLETED | OUTPATIENT
Start: 2021-01-09 | End: 2021-01-09

## 2021-01-09 RX ADMIN — OLANZAPINE 10 MG: 10 TABLET, ORALLY DISINTEGRATING ORAL at 17:12

## 2021-01-09 ASSESSMENT — ENCOUNTER SYMPTOMS
NERVOUS/ANXIOUS: 1
HALLUCINATIONS: 1
AGITATION: 1

## 2021-01-09 ASSESSMENT — MIFFLIN-ST. JEOR: SCORE: 1957.83

## 2021-01-09 NOTE — ED NOTES
[facility]  ED Arrival Note      Means of Arrival: Ambulance      Current behavior: Anxious, Hyperactive (agitated, impulsive) and Cooperative. Patient having conversation with voices that he's hearing.      Physical Appearance: Disheveled      Safety Concerns: Auditory Hallucinations and Visual Hallucinations    Legal Hold Status: [facility]    Constant Monitoring, Q15: Yes    Patient therapeutically Searched: By ED staff    Patient changed into scrubs: Yes    Belongings: Sealed and put in locker per unit protocol    Video Observation initiated and patient informed: Yes

## 2021-01-09 NOTE — ED TRIAGE NOTES
Pt presents to the ER via EMS on a hold by [place] [employer]. Per hold PD was called to [employer] to check the welfare of the pt. The pt was hearing voices in his head that were telling him what to say and what to do. PT believed the mob was going to kill him and his mom. Pt also believed there was a chip planted in his head. Per PD report pt was very paranoid and agitated and vomiting in the parking lot- unable to care for himself.

## 2021-01-09 NOTE — ED AVS SNAPSHOT
[facility]  [address] [address]  Phone: [phone]  Fax: [phone]                                    Terrance Fletcher   MRN: [id number]    Department: [facility]   Date of Visit: 1/9/2021           After Visit Summary Signature Page    I have received my discharge instructions, and my questions have been answered. I have discussed any challenges I see with this plan with the nurse or doctor.    ..........................................................................................................................................  Patient/Patient Representative Signature      ..........................................................................................................................................  Patient Representative Print Name and Relationship to Patient    ..................................................               ................................................  Date                                   Time    ..........................................................................................................................................  Reviewed by Signature/Title    ...................................................              ..............................................  Date                                               Time          22EPIC Rev 08/18

## 2021-01-09 NOTE — ED PROVIDER NOTES
History   Chief Complaint  Hallucinations    The history is provided by the EMS personnel, the police, medical records and the patient. The history is limited by the condition of the patient.      Terrance Fletcher is a 30 year old male with a history of substance abuse, psychosis, antisocial personality disorder, and major depressive disorder who was BIBA from [facility] for evaluation of hallucinations, agitation, and paranoia. Per medics, the patient was at [facility] when someone called for a well-fare check on him. When PD arrived, the they found the patient was hearing voices in his head that were telling him what to say and do. At that time, he told police that he believed the mob was going to kill him and his mom. He also believed that there was a chip implanted in his head. Terrance was very worked up at [facility] to the point of emesis in the parking lot. He was deemed unable to care for himself and was brought here for evaluation. Per the patient, he lives with his room mate and was visiting his mother yesterday. He is looking off in the distance and seemingly conversing with someone, asking for permission and what to say. He told nursing that he used methamphetamines yesterday, however asked his visual hallucination for permission to say this to myself.     Review of Systems   Unable to perform ROS: Psychiatric disorder   Psychiatric/Behavioral: Positive for agitation and hallucinations. The patient is nervous/anxious.        Allergies  NKDA    Medications  Remeron  Lexapro  Zyprexa    Past Medical History  Psychosis  Psoriasis  Substance abuse  antisocial personality disorder  major depressive disorde    Past Surgical History  Cosmetic surgery of face    Family History  Type 1 diabetes  Colorectal cancer  Psoriasis     Social History  Tobacco use: current every day smoker  Alcohol use: no  Drug use: yes, bath salts, methamphetamines   Marital Status: single  PCP: Allan Browning    Physical Exam     Patient  "Vitals for the past 24 hrs:   BP Temp Temp src Pulse Resp SpO2 Height Weight   01/09/21 1900 -- -- -- -- 16 94 % -- --   01/09/21 1830 -- -- -- -- -- 96 % -- --   01/09/21 1825 -- -- -- -- -- 95 % -- --   01/09/21 1700 -- -- -- -- -- 98 % -- --   01/09/21 1655 (!) 155/93 97.6  F (36.4  C) Oral 107 24 97 % 1.702 m (5' 7\") 103.4 kg (228 lb)       Physical Exam    GENERAL: Fidgety, restless  HEAD: atraumatic  EYES: pupils reactive, extraocular muscles intact, conjunctivae normal  ENT:  mucus membranes moist  NECK:  trachea midline, normal range of motion  RESPIRATORY: no tachypnea, breath sounds clear to auscultation   CVS: normal S1/S2, no murmurs, intact distal pulses  ABDOMEN: soft, nontender, nondistention  MUSCULOSKELETAL: no deformities  SKIN: warm and dry, no acute rashes or ulceration  NEURO: GCS 15, cranial nerves intact, alert and oriented x3  PSYCH:  Responding to external stimuli, agitated, looking off into the distance and having apparent conversations with a visual hallucination. Asking the hallucination for permission to talk to others and for instructions.      Emergency Department Course         Laboratory:  Laboratory findings were communicated with the patient who voiced understanding of the findings.      Drug abuse screen: Amphetamines: Positive (A), Cannabinoids: Positive (A), o/w WNL        Emergency Department Course:  Reviewed:  1645 I reviewed the patient's nursing notes, vitals, past medical records, Care Everywhere.     Assessments:  1650 I physically examined the patient as documented above.            Interventions:  1712 Zyprexa 10 mg PO    Disposition:  Signed out to partner    Impression & Plan       Medical Decision Making:  Terrance Fletcher is a 30 year old male who presents with auditory visual hallucinations and admits to using meth yesterday.  He is actively having a conversation in the room and getting permission on what he can or cannot tell me.  He feels that the [place] is " following him and that he has a chip implanted in his head.  Patient was given Zyprexa and continue to monitor.  Patient was signed out to my partner with hopes of improved mentation and hopefully discharge home.    Diagnosis:    ICD-10-CM    1. Psychosis, unspecified psychosis type (H)  F29    2. Methamphetamine abuse (H)  F15.10        Disposition:  Signed out to my partner    Discharge Medications:  New Prescriptions    No medications on file       Scribe Disclosure:  I, Robinson Elizabeth, am serving as a scribe at 4:59 PM on 1/9/2021 to document services personally performed by Everton Foss MD based on my observations and the provider's statements to me.      Everton Foss MD  01/09/21 2055

## 2021-01-09 NOTE — ED NOTES
Bed: BH3  Expected date:   Expected time:   Means of arrival:   Comments:  Saqib 451 29 yo M hold, meth use yesterday, odd behavior with hallucinations 5 min

## 2021-01-10 VITALS
TEMPERATURE: 97.6 F | WEIGHT: 228 LBS | HEART RATE: 101 BPM | SYSTOLIC BLOOD PRESSURE: 123 MMHG | OXYGEN SATURATION: 94 % | BODY MASS INDEX: 35.79 KG/M2 | RESPIRATION RATE: 16 BRPM | HEIGHT: 67 IN | DIASTOLIC BLOOD PRESSURE: 84 MMHG

## 2021-01-10 PROCEDURE — 250N000013 HC RX MED GY IP 250 OP 250 PS 637: Performed by: EMERGENCY MEDICINE

## 2021-01-10 RX ADMIN — OLANZAPINE 10 MG: 10 TABLET, ORALLY DISINTEGRATING ORAL at 11:47

## 2021-01-10 NOTE — ED NOTES
"Pt sleeping and awoke to verbal stimuli. Offered pt dinner and pt said he'd \"eat whatever\". Informed pt he would be talking with DEC at some pt and pt agreed to do that.  "

## 2021-01-10 NOTE — ED NOTES
Report received. Patient visualized. Resting in bed with eyes closed. Q15 minute checks started. Will continue to monitor.

## 2021-01-10 NOTE — ED NOTES
Patient resting in bed with eyes closed on left side. Continues to reposition in bed independently. Q15 minute checks continued. Will continue to monitor.

## 2021-01-10 NOTE — ED NOTES
Patient is resting in bed with eyes closed. Repositioning independently. Will continue to monitor.

## 2021-01-10 NOTE — ED NOTES
"Pt sleeping. Pt briefly awoke to verbal stimuli but then fell back asleep quickly. Pt states he's tired and needs \"to sleep\". HOB lowered.  "

## 2021-01-10 NOTE — ED PROVIDER NOTES
"[facility] ED Behavioral Health Handoff Note:       Brief HPI:  This is a 30 year old male signed out to me by Dr. Vo .  See initial ED Provider note for details of the presentation.     Patient is medically cleared for admission to a Behavioral Health unit.      Pending studies: none.      Hold Status:  Active Orders   Legal    Legal Status: RC - Health Officer Authority to Detain     Frequency: Effective Now     Start Date/Time: 01/09/21 1724      Number of Occurrences: Until Specified       The patient has required medication for agitation.    The patient's home medications have not been reviewed and ordered/administered.    Exam:   Temp:  [97.6  F (36.4  C)] 97.6  F (36.4  C)  Pulse:  [] 77  Resp:  [16-24] 16  BP: (116-155)/(64-95) 149/95  SpO2:  [93 %-98 %] 96 %    On my multiple assessments, the patient continues to be somewhat agitated.  He appears to be responding to internal stimuli, having conversations with someone in the room who is telling him that it is dangerous if he leaves.  He will oscillate from agreeing to go to detox to then telling me that it is not safe for him to go anywhere.  He states that he does not have any vertigo, that he \"blew it with my roommate\" and has no place to stay.  We discussed homeless shelters, detox centers, or any other friends or family who he could stay with.  However, he is too disorganized to be able to make sound decisions.    ED Course:    Medications   OLANZapine zydis (zyPREXA) ODT tab 10 mg (10 mg Oral Given 1/10/21 1147)   OLANZapine zydis (zyPREXA) ODT tab 10 mg (10 mg Oral Given 1/9/21 1712)       There were no significant events while under my care.  The patient was given another dose of oral Zyprexa when he seemed to be more upset as we were trying to determine a final disposition.  On reassessment he is now more calm.  He eventually made some vague statements about being suicidal, though he admits that he is saying this because he does " not want to leave or does not think that it is safe for him to leave because of an undisclosed external threat.    It is my opinion that he is not yet cleared enough to be discharged home.  He does not desire to be discharged.  He is homeless.  It is unclear how much of this is his psychosis and methamphetamine intoxication versus some portion of secondary gain.  He is now approaching 24 hours of ED time.  I believe that he may need to be admitted to the hospital.  I will have him assessed by our DEC team to determine whether he would meet criteria for admission.  Otherwise, we will continue to observe him in the safe environment until he clears his illicit drug intoxication.    Patient was signed out to the oncoming provider. Dr. Davalos      Impression:    ICD-10-CM    1. Psychosis, unspecified psychosis type (H)  F29    2. Methamphetamine abuse (H)  F15.10        Plan:    1. Await DEC evaluation and final disposition      RESULTS:   Results for orders placed or performed during the hospital encounter of 01/09/21 (from the past 24 hour(s))   Drug abuse screen 77 urine (FL, RH, SH)     Status: Abnormal    Collection Time: 01/09/21  5:13 PM   Result Value Ref Range    Amphetamine Qual Urine Positive (A) NEG^Negative    Barbiturates Qual Urine Negative NEG^Negative    Benzodiazepine Qual Urine Negative NEG^Negative    Cannabinoids Qual Urine Positive (A) NEG^Negative    Cocaine Qual Urine Negative NEG^Negative    Opiates Qualitative Urine Negative NEG^Negative    PCP Qual Urine Negative NEG^Negative             Chad A. Trierweiler, MD Trierweiler, Chad A, MD  01/10/21 2499

## 2021-01-10 NOTE — ED NOTES
"I asked pt if he wanted to go home and he said he didn't have a home. I offered to call a shelter which he declined. Pt began yelling after I left the room \"They want to kick me out, what should I do?\" Pt appears to be have auditory hallucinations. Zyprexa given.   "

## 2021-01-10 NOTE — ED PROVIDER NOTES
Briefly, this is a 30-year-old patient with past medical history of substance abuse, psychosis and depression who is brought into the emergency department for agitation, ongoing hallucinations and paranoia.  Patient admits to methamphetamine use and there is suspicion that this is exacerbating his symptoms.  Patient was initially seen by my partner Dr. Foss, please see his note for full details.  Plan is to treat the patient with Zyprexa and observe overnight. If the patient has significant clinical improvement in the morning he can be discharged home otherwise he may require DEC evaluation.     Iggy Vo MD  01/10/21 0501

## 2021-01-10 NOTE — ED NOTES
"Pt sleeping. Awakens to verbal stimuli. Pt repositioned in bed and new blanket given. Pt states he wants to \"sleep longer\" and then would talk to DEC.  "

## 2021-01-10 NOTE — ED NOTES
Patient sat up and stood at bedside. Patient stretched looked around and then sat back down. Patient now appears to be resting in bed with eyes closed on left side. Repositioning independently. Will continue to monitior.

## 2021-01-10 NOTE — ED NOTES
Patient could not get hold of his mother, refusing to leave. [address] called for bed. No bed available at this time but might have some open later afternoon.

## 2021-01-10 NOTE — ED NOTES
Patient eyes darting around room and having conversation with auditory hallucinations. Cooperative but difficult to direct in answering questions. Pt legs restless. Side rails up x2, laying on cart.

## 2021-01-10 NOTE — ED NOTES
Attempted to finish triage with patient. Patient was sleeping on cart, woke up to put pulse ox on but patient unable to keep eyes open. Respirations clear and unlabored.

## 2021-01-10 NOTE — ED NOTES
"Pt sleeping but awakens to verbal stimuli. Pt twitchy, speaking fast and erratically. States \"I'm not safe to leave here\" but won't explain why he says that. Pt states he was kicked out of his friend's place and has nowhere to go. Informed pt we could give him a list of homeless shelters but pt states he's \"not doing that\".  "

## 2021-01-10 NOTE — ED NOTES
"Pt woken up by RN per Dr. Vo's verbal order. Pt's VS assessed, and given water, juice and sandwich. Plan of care reviewed with patient. Pt appeared anxious, stated \"I don't feel safe here... and I'm in a bad situation at my house.\" Pt reassured and plan of care reviewed again. Patient stated to RN, \"I think I'll go to [facility]...I feel safe there.\" Pt stated he does not have anyone to call for a ride.     Dr. Trierweiler (oncoming MD) notified of elevated HR (see flowsheet) and nursing interventions. No new orders received. Will continue to monitor.   "

## 2021-01-10 NOTE — ED NOTES
"Dr. Trierweiler at bedside talking to pt. Pt reluctant to going to detox. When pt was given choice of being discharged to a shelter or detox, pt stated \"I guess I'm suicidal\".  "

## 2021-01-10 NOTE — ED NOTES
Report received. Assumed care of pt. Pt was on Health Officer Hold that has . Dr. Trierweiler aware of this. Pt needs to decide if he wants to go home or to detox.

## 2021-01-11 NOTE — ED NOTES
"As noted below, attempted DEC Assessment.  Patient indicated that he didn't want to talk this evening.  He stated he would perhaps talk tomorrow.  I asked him if he otherwise okay, if he thought he was going to hurt himself and he said, \"I'm just tired. Tomorrow.  Maybe tomorrow.\"  I ended the interview due to lack of cooperation.  "

## 2021-01-11 NOTE — ED PROVIDER NOTES
Patient was reassessed after DEC evaluation.  He has been resting in the ER for 27 hours.  He is no longer psychotic, he denies being suicidal.  He is refusing to talk at length with the DEC .  He is no longer holdable and will be discharged to home.  He will be given resources to stop using methamphetamine.       Donald Davalos MD  01/10/21 2004

## 2021-03-29 NOTE — ED NOTES
DEC called and stated pt told her he didn't want to talk to her because he's tired and wants to sleep. Pt told DEC he'd talk to her tomorrow. Dr. Davalos informed.   Patient arrived on floor at 1215.  Vital signs are WNL and her pain was around a three; she described it as pressure.  Labor nurse and this nurse checked baby bands and fundal placement together.  Patient has a dressing on with drainage marked.  Nurse introduced family to safety protocol for infant and plan for first day.  Mother is an [facility] patient so reynolds was removed right away.  Spouse present and helpful.

## 2021-06-24 ENCOUNTER — OFFICE VISIT (OUTPATIENT)
Dept: FAMILY MEDICINE | Facility: CLINIC | Age: 31
End: 2021-06-24
Payer: COMMERCIAL

## 2021-06-24 VITALS
DIASTOLIC BLOOD PRESSURE: 75 MMHG | OXYGEN SATURATION: 95 % | HEART RATE: 82 BPM | TEMPERATURE: 98.6 F | BODY MASS INDEX: 31.01 KG/M2 | RESPIRATION RATE: 16 BRPM | SYSTOLIC BLOOD PRESSURE: 107 MMHG | WEIGHT: 198 LBS

## 2021-06-24 DIAGNOSIS — F15.20 METHAMPHETAMINE USE DISORDER, SEVERE, IN CONTROLLED ENVIRONMENT (H): ICD-10-CM

## 2021-06-24 DIAGNOSIS — Z59.00 HOMELESS: ICD-10-CM

## 2021-06-24 DIAGNOSIS — F41.1 GAD (GENERALIZED ANXIETY DISORDER): ICD-10-CM

## 2021-06-24 DIAGNOSIS — F33.3 SEVERE EPISODE OF RECURRENT MAJOR DEPRESSIVE DISORDER, WITH PSYCHOTIC FEATURES (H): Primary | ICD-10-CM

## 2021-06-24 PROCEDURE — 99204 OFFICE O/P NEW MOD 45 MIN: CPT | Mod: GC | Performed by: STUDENT IN AN ORGANIZED HEALTH CARE EDUCATION/TRAINING PROGRAM

## 2021-06-24 RX ORDER — ESCITALOPRAM OXALATE 20 MG/1
20 TABLET ORAL DAILY
Qty: 30 TABLET | Refills: 0 | Status: SHIPPED | OUTPATIENT
Start: 2021-06-24 | End: 2021-07-21

## 2021-06-24 SDOH — ECONOMIC STABILITY - HOUSING INSECURITY: HOMELESSNESS UNSPECIFIED: Z59.00

## 2021-06-24 ASSESSMENT — ANXIETY QUESTIONNAIRES
5. BEING SO RESTLESS THAT IT IS HARD TO SIT STILL: NEARLY EVERY DAY
2. NOT BEING ABLE TO STOP OR CONTROL WORRYING: SEVERAL DAYS
GAD7 TOTAL SCORE: 15
7. FEELING AFRAID AS IF SOMETHING AWFUL MIGHT HAPPEN: NEARLY EVERY DAY
IF YOU CHECKED OFF ANY PROBLEMS ON THIS QUESTIONNAIRE, HOW DIFFICULT HAVE THESE PROBLEMS MADE IT FOR YOU TO DO YOUR WORK, TAKE CARE OF THINGS AT HOME, OR GET ALONG WITH OTHER PEOPLE: EXTREMELY DIFFICULT
6. BECOMING EASILY ANNOYED OR IRRITABLE: NEARLY EVERY DAY
1. FEELING NERVOUS, ANXIOUS, OR ON EDGE: MORE THAN HALF THE DAYS
3. WORRYING TOO MUCH ABOUT DIFFERENT THINGS: MORE THAN HALF THE DAYS

## 2021-06-24 ASSESSMENT — PATIENT HEALTH QUESTIONNAIRE - PHQ9: 5. POOR APPETITE OR OVEREATING: SEVERAL DAYS

## 2021-06-24 NOTE — PATIENT INSTRUCTIONS
Here is the plan from today's visit    1. Severe episode of recurrent major depressive disorder, with psychotic features (H)  - escitalopram (LEXAPRO) 20 MG tablet; Take 1 tablet (20 mg) by mouth daily  Dispense: 30 tablet; Refill: 0  - I'll look through your chart and see if I can find the [employer] records. If I do, I'll call if it changes what meds I'd like to prescribe.   - I do think you should see a therapist; so if [facility] doesn't get you established, then reach out so we can refer you.   - We probably should also get you set up with a psychiatrist, and I'll place a referral now.   - I'll have our SW call also and we'll see if between , behavioral health, and me, we can find a MICD program for you    Follow up plan: Follow-up w/ me (Dr. Jessica Amezquita) in one week. Stop at  on way out to schedule follow-up appointment with me or other [facility] provider.     Lab testing:   - If you had lab testing today and your results are reassuring or normal, they will be mailed to you or sent through [facility] within 7 days.  - If the lab tests need quick action, we'll call with the results. The phone number we will call is # [phone]. If this is not the best number, please call our clinic and change the number.     Medication refills:  - If you need any refills, please call your pharmacy and they will contact us.   - If you need to  your refill at a new pharmacy, please contact the new pharmacy directly.     Scheduling:   - If you have any concerns about today's visit or wish to schedule another appointment, please call our office during normal business hours at [phone]  - If a referral was made to [facility] and you don't get a call from central scheduling, please call [phone].  - If a mammogram was ordered for you at [facility], call [phone] to schedule or change your appointment.   - If you had an x-ray/CT/ultrasound/MRI ordered, the number is [phone] to  schedule or change your radiology appointment.    Medical concerns: If you have urgent medical concerns, please call [phone] at any time of the day.       Jessica Amezquita MD

## 2021-06-24 NOTE — PROGRESS NOTES
"    Assessment & Plan   Terrance is a 31 year old male with unstable housing (homeless prior to joining [facility]), methamphetamine use disorder (sober since ~6/18), depression, and history of multiple suicide attempts (most recent 2019 via carbon monoxide poisoning) who presents today requesting \"some depression meds.\"     Terrance is a new patient to me and is new to our system as well. Interviewing the patient and chart review suggest he is not well connected with the healthcare system and doesn't receive regular care. The last time he was evaluated formally for depression was in July 2019 at [facility] in [place] w/ SUZAN Colin At that time. He was a resident of [facility] for methamphetamine use disorder. He was on olanzopine 5mg BID to help w/ anxiety and paranoia and lexapro 20mg daily (no side effects, moderate benefit). Nallely advised Terrance to follow with a psychiatrist (didn't do) and to undergo GeneSight testing, which Terrance says he did do, but I'm unable to find those results.     Today, Terrance states he feels really low, but finally had enough energy (withdrawing from meth) to reach out for help. His PHQ-9 score is 24, but Question 9 is zero. (He doesn't have any thoughts of suicide or homicide.) He requests to be restarted on lexapro (last dose was ~2 weeks ago). I advised we start at 10mg daily and quickly uptitrate, but he said that doesn't do anything for him, and he's just going to take 20mg daily anyway. That said, I prescribed 20mg daily and forewarned him about possible side effects. Would like to find GeneSight testing results to see if lexapro is best agent for him. He'll also try some deep breathing exercises this week.     He presented to [facility] early this week for help with meth use disorder, but truthfully, he mostly presented for stable housing because he is otherwise homeless. He is not liking it there much and is considering going to [place] to go to " "[facility]. He would appreciate our assistance in referrals to other MICD programs.  is aware and is working to help Terrance with this.     Terrance will present for follow-up with me next week (already scheduled for Tues, June 29). At that time,  will hopefully be able to meet with patient as well so we can support him in substance use disorder recovery as well as in finding stable psychiatric support. Can also offer HIV/HepC testing d/t meth use disorder.       Jessica Amezquita MD  [facility]    Gisela Carlos is a 31 year old who presents for the following health issues    Reviewed note from 7/2/2019 at [facility] in [place] w/ BOB Colin. At that time:   - Patient of [facility] for methamphetamine use disorder.   - On olanzopine 5mg BID to help w/ anxiety and paranoia (continued)  - H/o multiple suicide attempts  - Recently released from halfway   - On lexapro 20mg daily w/o side effects, w/ mild-moderate benefit  - Paranoia, anxiety, depression, h/o meth use disorder   - Recommend regular follow-up with psychiatrist   - Advised GeneSight testing-- couldn't find results    Currently at [facility]- just started there a couple of days ago for help w/ meth use disorder. He used meth and occasionally xanax to come off of meth. Last meth use about a week ago. Was couch hopping/homeless prior to [facility]. Was in [facility] prior to that until mid-June. (Was there since February). Longest period of sobriety is 9 months. Saw Dr. Ordonez through [facility] when at [facility]. Was on lexapro at that time-- has been out of it now for two weeks.  Not on any meds.  Not allergic to any meds.     Reviewed PHQ-9. Score 24.  No thoughts of hurting self or others at this point. Last suicide attempt was 2019 \"by asphyxiation\" in his garage (carbon monoxide).     [facility] STI testing \"in March or something.\" " "\"All negative\" per patient report.         No family/friends/other support.     Currently experiencing these symptoms:depressed mood, hopelessness, diminished interest or pleasure in activities, decreased appetite, insomnia, excessive sleepiness, psychomotor agitation (restless and /or feeling on edge), psychomotor retardation (slowness of thought and reaction), fatigue, feelings of worthlessness, feelings of guilt, difficulty with concentration, anxiety, irritablility, sleep disturbance (sleeping too much).     Delusions-- paranoia. Scared people out to get him.   No hallucinations unless on meth.     Recent PHQ-9 Scores:  PHQ 6/24/2021   PHQ-9 Total Score 24   Q9: Thoughts of better off dead/self-harm past 2 weeks Not at all     Recent RAKESH-7 Scores:   RAKESH-7 SCORE 6/24/2021   Total Score 15         Objective    /75   Pulse 82   Temp 98.6  F (37  C) (Oral)   Resp 16   Wt 89.8 kg (198 lb)   SpO2 95%   BMI 31.01 kg/m    Body mass index is 31.01 kg/m .  Physical Exam       MENTAL STATUS EXAM  Appearance: casually dressed  Behavior: normal  Eye Contact: poor  Speech: normal pace, brief, normal volume, occasionally gets heightened/agitated speech  Language: normal  Mood:  \"how do you think I feel?\" \"I feel like crap.\"  Affect: Alternating between restricted and somewhat agitated   Thought Process: Linear and logical.   Thought content: free of suicidal ideation, hallucinations.   Insight: adequate  Judgment: adequate    ----- Service Performed and Documented by Resident or Fellow ------          "

## 2021-06-25 ASSESSMENT — ANXIETY QUESTIONNAIRES: GAD7 TOTAL SCORE: 15

## 2021-06-28 ENCOUNTER — TELEPHONE (OUTPATIENT)
Dept: PSYCHOLOGY | Facility: CLINIC | Age: 31
End: 2021-06-28

## 2021-06-28 PROBLEM — F33.3 SEVERE EPISODE OF RECURRENT MAJOR DEPRESSIVE DISORDER, WITH PSYCHOTIC FEATURES (H): Status: ACTIVE | Noted: 2021-06-28

## 2021-06-28 PROBLEM — Z59.00 HOMELESS: Status: ACTIVE | Noted: 2021-06-28

## 2021-06-28 PROBLEM — F41.1 GAD (GENERALIZED ANXIETY DISORDER): Status: ACTIVE | Noted: 2021-06-28

## 2021-06-28 PROBLEM — F15.20 METHAMPHETAMINE USE DISORDER, SEVERE, IN CONTROLLED ENVIRONMENT (H): Status: ACTIVE | Noted: 2021-06-28

## 2021-06-28 NOTE — TELEPHONE ENCOUNTER
"Patient did not show to appt on 2021 at 3:00 pm with Dr. Amezquita. Received the following message from [facility] program:      \"Our waiting list is approximately 6 weeks for men. However, please have the chemical use assessment/Rule 25 and any MH diagnostic faxed to us at [phone] attn: Ozzie for review and approval. It doesn t hurt to have a backup plan in case the original plans don t work out. Please give me a call at [phone] if you have any further questions. Thanks and have a great weekend!    AZIZA Amor\"    Patient will need a Rule 25 if a recent one hasn't been done. Will ask  to reach out to patient recommending follow up with Dr. Amezquita and scheduling a Rule 25 by calling [facility] number.      Mental Health Referral  Please Refer out to:    Substance Use Recourses    A Rule 25 (Chemical Use) Assessment can be completed for individuals who need help to pay for chemical health treatment. Funding is set aside in the [facility]. Contact your county to inquire.     [facility]   Contact: [phone] or [phone].  Call to to schedule rule 25 assessment. You must be a Counts include 234 beds at the [facility] resident or covered by [facility] AND meet one of these criteria: Income guidelines, be on a medical assistance ([place]) fee-for-service program, OR be on MNSure    Assessment, Supervised Detox, Residential, IOP, and Outpatient:    [employer] [facility]   [email or web address]/program-category/treatment-recovery/  Call Central [phone]  [employer] operates more than 60 programs in the [place] metro area including children s programs, crisis residences, programs to help the homeless, residential programs, substance use treatment, mental health treatment services, case management, and in-home health services. Contacting central screening to complete brief phone assessment to determine appropriate service and facilitates connection to " nearest facility.    [place] [facility]   www.[facility].org  Text MNTC to 698253  Call 165-OAHSWWT  Email [email or web address]  Residential, Outpatient, serves adults and teens (minimum age is 16); has short-term treatment and a long-term recovery program; uses 12-step, cognitive behavioral therapy, motivational enhancement; faithbased, and recovery management; high school on-site and GED programming available. [facility] has several locations in [place], contact admissions for help determining most appropriate facility.    [facility]  [email or web address]  Inpatient: [phone] or [phone]  Outpatient: [phone] [address], [place]   Assessments, Inpatient & Outpatient for adults, children, & teens, Family Counseling, Aftercare, medically supervised detoxification, and treatment for deaf & hard of hearing.     [facility] & Addiction Connection Line   [email or web address]/[email or web address]  Call: [phone]   Provides assistance with determining the right service, referrals, scheduling appointments, connections to crisis services, and additional mental health and addiction information.     [facility]  http://www.resource-[place].org/chemical-mental-health/  [phone]  [address]  Assessments, outpatient, co-occurring, residential for women, recovery housing for men and women. Walk in assessments are Monday through Friday. Doors open between 6:30-7:00am, and assessments begin at 7:30am. First come, first served. Arrive early as capacity is often reached by 7:00am.     [facility]   [email or web address]  [phone]  [address]  Assessments, Co-Occurring, Residential, & Outpatient.    [facility] > Co-occurring Addiction Clinic ([facility])  [email or web address]  [phone]  [address] [facility] [address]  Provides services through Intensive Day Program (IDP) or Intensive Outpatient Program (IOP) for individuals suffering with addiction.      Outpatient Programs for Substance Use + Comorbid Mental Health:    [facility]  [address] [place] [address]    Phone: [phone]   Fax: [phone]    [place] [facility] [place] (multiple locations)  [facility]  [place] [address]  Phone:  [phone]    [facility]  Dual Disorder Program  [address] [place] [address]    5 days per week  9am -12 or 11am-2pm  [facility] and [facility] Phone:  [phone]        Please document when patient is given this information and close this note.  Thank you.

## 2021-06-29 ASSESSMENT — PATIENT HEALTH QUESTIONNAIRE - PHQ9
SUM OF ALL RESPONSES TO PHQ QUESTIONS 1-9: 24
5. POOR APPETITE OR OVEREATING: SEVERAL DAYS

## 2021-06-29 ASSESSMENT — ANXIETY QUESTIONNAIRES
1. FEELING NERVOUS, ANXIOUS, OR ON EDGE: MORE THAN HALF THE DAYS
2. NOT BEING ABLE TO STOP OR CONTROL WORRYING: SEVERAL DAYS
GAD7 TOTAL SCORE: 15
IF YOU CHECKED OFF ANY PROBLEMS ON THIS QUESTIONNAIRE, HOW DIFFICULT HAVE THESE PROBLEMS MADE IT FOR YOU TO DO YOUR WORK, TAKE CARE OF THINGS AT HOME, OR GET ALONG WITH OTHER PEOPLE: EXTREMELY DIFFICULT
6. BECOMING EASILY ANNOYED OR IRRITABLE: NEARLY EVERY DAY
3. WORRYING TOO MUCH ABOUT DIFFERENT THINGS: MORE THAN HALF THE DAYS
5. BEING SO RESTLESS THAT IT IS HARD TO SIT STILL: NEARLY EVERY DAY
7. FEELING AFRAID AS IF SOMETHING AWFUL MIGHT HAPPEN: NEARLY EVERY DAY

## 2021-06-30 NOTE — PROGRESS NOTES
Preceptor Attestation:   Patient seen, evaluated and discussed with the resident. I have verified the content of the note, which accurately reflects my assessment of the patient and the plan of care.   Supervising Physician:  Amber Connell, DO

## 2021-07-02 ENCOUNTER — ANCILLARY PROCEDURE (OUTPATIENT)
Dept: GENERAL RADIOLOGY | Facility: CLINIC | Age: 31
End: 2021-07-02
Attending: FAMILY MEDICINE
Payer: COMMERCIAL

## 2021-07-02 ENCOUNTER — OFFICE VISIT (OUTPATIENT)
Dept: FAMILY MEDICINE | Facility: CLINIC | Age: 31
End: 2021-07-02
Payer: COMMERCIAL

## 2021-07-02 VITALS
RESPIRATION RATE: 16 BRPM | TEMPERATURE: 98.6 F | WEIGHT: 200 LBS | DIASTOLIC BLOOD PRESSURE: 75 MMHG | HEART RATE: 73 BPM | OXYGEN SATURATION: 95 % | SYSTOLIC BLOOD PRESSURE: 120 MMHG | BODY MASS INDEX: 31.32 KG/M2

## 2021-07-02 DIAGNOSIS — F41.1 GAD (GENERALIZED ANXIETY DISORDER): ICD-10-CM

## 2021-07-02 DIAGNOSIS — F33.1 MDD (MAJOR DEPRESSIVE DISORDER), RECURRENT EPISODE, MODERATE (H): ICD-10-CM

## 2021-07-02 DIAGNOSIS — R07.9 CHEST PAIN, UNSPECIFIED TYPE: Primary | ICD-10-CM

## 2021-07-02 LAB
ALBUMIN SERPL-MCNC: 3.8 G/DL (ref 3.4–5)
ALP SERPL-CCNC: 80 U/L (ref 40–150)
ALT SERPL W P-5'-P-CCNC: 29 U/L (ref 0–70)
ANION GAP SERPL CALCULATED.3IONS-SCNC: 6 MMOL/L (ref 3–14)
AST SERPL W P-5'-P-CCNC: 10 U/L (ref 0–45)
BILIRUB SERPL-MCNC: 0.2 MG/DL (ref 0.2–1.3)
BUN SERPL-MCNC: 17 MG/DL (ref 7–30)
CALCIUM SERPL-MCNC: 9.4 MG/DL (ref 8.5–10.1)
CHLORIDE SERPL-SCNC: 108 MMOL/L (ref 94–109)
CO2 SERPL-SCNC: 23 MMOL/L (ref 20–32)
CREAT SERPL-MCNC: 0.84 MG/DL (ref 0.66–1.25)
GFR SERPL CREATININE-BSD FRML MDRD: >90 ML/MIN/{1.73_M2}
GLUCOSE SERPL-MCNC: 88 MG/DL (ref 70–99)
POTASSIUM SERPL-SCNC: 3.7 MMOL/L (ref 3.4–5.3)
PROT SERPL-MCNC: 7.3 G/DL (ref 6.8–8.8)
SODIUM SERPL-SCNC: 137 MMOL/L (ref 133–144)
TSH SERPL DL<=0.005 MIU/L-ACNC: 1.14 MU/L (ref 0.4–4)

## 2021-07-02 PROCEDURE — 84443 ASSAY THYROID STIM HORMONE: CPT | Performed by: FAMILY MEDICINE

## 2021-07-02 PROCEDURE — 93000 ELECTROCARDIOGRAM COMPLETE: CPT | Performed by: FAMILY MEDICINE

## 2021-07-02 PROCEDURE — 71046 X-RAY EXAM CHEST 2 VIEWS: CPT | Mod: FY | Performed by: RADIOLOGY

## 2021-07-02 PROCEDURE — 36415 COLL VENOUS BLD VENIPUNCTURE: CPT | Performed by: FAMILY MEDICINE

## 2021-07-02 PROCEDURE — 99214 OFFICE O/P EST MOD 30 MIN: CPT | Performed by: FAMILY MEDICINE

## 2021-07-02 PROCEDURE — 80053 COMPREHEN METABOLIC PANEL: CPT | Performed by: FAMILY MEDICINE

## 2021-07-02 ASSESSMENT — ANXIETY QUESTIONNAIRES
6. BECOMING EASILY ANNOYED OR IRRITABLE: MORE THAN HALF THE DAYS
2. NOT BEING ABLE TO STOP OR CONTROL WORRYING: MORE THAN HALF THE DAYS
5. BEING SO RESTLESS THAT IT IS HARD TO SIT STILL: NEARLY EVERY DAY
1. FEELING NERVOUS, ANXIOUS, OR ON EDGE: SEVERAL DAYS
IF YOU CHECKED OFF ANY PROBLEMS ON THIS QUESTIONNAIRE, HOW DIFFICULT HAVE THESE PROBLEMS MADE IT FOR YOU TO DO YOUR WORK, TAKE CARE OF THINGS AT HOME, OR GET ALONG WITH OTHER PEOPLE: SOMEWHAT DIFFICULT
7. FEELING AFRAID AS IF SOMETHING AWFUL MIGHT HAPPEN: SEVERAL DAYS
3. WORRYING TOO MUCH ABOUT DIFFERENT THINGS: MORE THAN HALF THE DAYS
GAD7 TOTAL SCORE: 11

## 2021-07-02 ASSESSMENT — PATIENT HEALTH QUESTIONNAIRE - PHQ9
SUM OF ALL RESPONSES TO PHQ QUESTIONS 1-9: 4
5. POOR APPETITE OR OVEREATING: NOT AT ALL

## 2021-07-02 NOTE — PROGRESS NOTES
I was present for the entire duration of the interview, exam and education. Entirety of note was reviewed by me, orders discussed, plan made concurrently with the student. Note addended directly by me.   34 minutes spent on the date of the encounter doing chart review, interpretation of tests, patient visit and documentation

## 2021-07-02 NOTE — LETTER
July 3, 2021    Terrance Fletcher  [address] [address]    Dear Terrance,    Thank you for getting your care at [facility]. Please see below for your test results. They are all normal. Please follow up with Dr. Amezquita after your stress test!    Resulted Orders   TSH with free T4 reflex   Result Value Ref Range    TSH 1.14 0.40 - 4.00 mU/L   Comprehensive metabolic panel   Result Value Ref Range    Sodium 137 133 - 144 mmol/L    Potassium 3.7 3.4 - 5.3 mmol/L    Chloride 108 94 - 109 mmol/L    Carbon Dioxide 23 20 - 32 mmol/L    Anion Gap 6 3 - 14 mmol/L    Glucose 88 70 - 99 mg/dL    Urea Nitrogen 17 7 - 30 mg/dL    Creatinine 0.84 0.66 - 1.25 mg/dL    GFR Estimate >90 >60 mL/min/[1.73_m2]      Comment:      Non  GFR Calc  Starting 12/18/2018, serum creatinine based estimated GFR (eGFR) will be   calculated using the Chronic Kidney Disease Epidemiology Collaboration   (CKD-EPI) equation.      GFR Estimate If Black >90 >60 mL/min/[1.73_m2]      Comment:       GFR Calc  Starting 12/18/2018, serum creatinine based estimated GFR (eGFR) will be   calculated using the Chronic Kidney Disease Epidemiology Collaboration   (CKD-EPI) equation.      Calcium 9.4 8.5 - 10.1 mg/dL    Bilirubin Total 0.2 0.2 - 1.3 mg/dL    Albumin 3.8 3.4 - 5.0 g/dL    Protein Total 7.3 6.8 - 8.8 g/dL    Alkaline Phosphatase 80 40 - 150 U/L    ALT 29 0 - 70 U/L    AST 10 0 - 45 U/L   Sincerely,

## 2021-07-02 NOTE — PROGRESS NOTES
"    Assessment & Plan     Chest pain, unspecified type  Patient presents with history of sharp CP, exertional in nature, relieved at rest. Concerning family history given mother with early-onset CAD and MI in 30s. Recent history of withdrawal from methamphetamine. Considered broad differential including classical angina, cardiac arrhythmia, GERD, pleuritis, anxiety, musculoskeletal etiologies. History and exam less concerning for acute processes such as aortic dissection, acute coronary syndrome, PE.  - EKG 12-lead: demonstrates regular rate, normal sinus rhythm. No evidence of cardiac ischemia or arrhythmia. RSR' complex in lead V1, without QRS prolongation, which may represent a normal variant vs partial RBBB. No previous EKGs available for comparison.  - Comprehensive metabolic panel: pending  - XR Chest 2 Views: compared to previous from 2011. Normal cardiac silhouette, normal diaphragmatic contours, no bony abnormalities, normal pulmonary vasculature.   - Exercise stress EKG: Given constellation of symptoms, with significant cardiac family history, recent methamphetamine use, and possible cardiac conduction abnormalities on EKG, feel that this warrants further evaluation with outpatient stress EKG.    Moderate episode of recurrent major depressive disorder (H)  RAKESH (generalized anxiety disorder)  Patient describes his mood as lots of \"ups and downs.\" No alarm symptoms. He's noticing moderate benefit already after restarting lexapro; discussed typical timeframe of up to 6 weeks for symptomatic improvement, and encouraged him to continue on this medication. Significant social stressors of recent sobriety from methamphetamine, unstable housing - currently in residential treatment at [facility]. Discussed importance of finding activities that bring him johana - exercise, connecting with other people. The latter has been difficult for him, as he is unable to leave his treatment facility for 30 days. He has " declined behavioral health referrals at this time.  - TSH with free T4 reflex: rule-out alternative etiologies for mood changes        Teri Perdomo MS4     Tobacco Cessation:   reports that he has quit smoking. He smoked 0.00 packs per day. He has quit using smokeless tobacco.      No follow-ups on file.    The information in this document, created by the medical scribe for me, accurately reflects the services I personally performed and the decisions made by me. I have reviewed and approved this document for accuracy prior to leaving the patient care area.  Nahomy Rausch MD  2:10 PM, 07/02/21    [facility] WAGNER Carlos is a 31 year old who presents for the following health issues     HPI   Resp  Patient reports chest pain with exercise only. Patient describes the pain as sharp needle-like pain. Patient has previously had this issue but denies being diagnosed with any heart issues in the past. Patient reports the last time he experienced the chest pain was a few days ago on his run. Patient usually completes his workout and when he rests, the pain tends to go away. Patient denies any fluttering of his heart, tightness, cough, difficulty breathing, nausea, or reflux.     Patient reports to always have pain when laying on his left side.     Patient reports he quit smoking recently. He previously would get hot flashes but has not had them in a while.     Social hx  Patient's maternal grandma had a heart attack in her 30's. Patient's maternal uncle had high blood pressure and diabetes.      Anxiety  Patient reports high highs and low lows of anxiety and states he is unable to control them. This is a relatively new diagnoses for him. He previously was diagnosed with extreme depression with an episode of an attempted suicide. Patient denies having any form of suicidal thoughts.     Patient reports the lows are worse than the highs because they come with uncontrolled rage. He believes he may be  bipolar due to the unpredictability of his highs or lows. When something sets him off, he tends to dwell on it, but is unable to recognize a specific pattern.    Patient has been on the Lexapro for around 11 days and reports immediate improvement. Patient denies any worsening symptoms, but has had a plateau in the past on this medication. He has previously used Lexapro with alcohol which caused his suicide attempt. Patient is no longer drinker.      Mood  Patient reports people bring him johana with life but was hard with covid. He is not able to see the people that mean the most to him. Has somewhat made his symptoms worse. States that's possible to change.     This document serves as a record of the services and decisions personally performed and made by Nahomy Rausch MD. It was created on his/her behalf by Michelle Hernandez, a trained medical scribe. The creation of this document is based the provider's statements to the medical scribe.  Porter Hernandez 2:10 PM, July 2, 2021        Objective    /75   Pulse 73   Temp 98.6  F (37  C) (Oral)   Resp 16   Wt 90.7 kg (200 lb)   SpO2 95%   BMI 31.32 kg/m    Body mass index is 31.32 kg/m .   Vitals were reviewed and were normal.     Physical Exam    GENERAL: healthy, alert and no distress  EYES: Eyes grossly normal to inspection, PERRL and conjunctivae and sclerae normal  HENT: ear canals and TM's normal, nose and mouth without ulcers or lesions  NECK: no adenopathy, no asymmetry, masses, or scars and thyroid normal to palpation  RESP: lungs clear to auscultation - no rales, rhonchi or wheezes  CV: regular rate and rhythm, normal S1 S2, no S3 or S4, no murmur, click or rub, no peripheral edema and peripheral pulses strong  ABDOMEN: soft, nontender, no hepatosplenomegaly, no masses and bowel sounds normal  MS: no gross musculoskeletal defects noted, no edema  SKIN: no suspicious lesions or rashes  NEURO: Normal strength and tone, mentation intact and speech  normal  PSYCH: mentation appears normal, affect blunted.

## 2021-07-03 ASSESSMENT — ANXIETY QUESTIONNAIRES: GAD7 TOTAL SCORE: 11

## 2021-07-20 DIAGNOSIS — F33.3 SEVERE EPISODE OF RECURRENT MAJOR DEPRESSIVE DISORDER, WITH PSYCHOTIC FEATURES (H): ICD-10-CM

## 2021-07-20 NOTE — TELEPHONE ENCOUNTER
Escitalopram 20mg last filled 6/24/2021 for #30    Thank you,    Irma Worthy, PharmD  [facility]  [phone]

## 2021-07-20 NOTE — LETTER
Terrance Fletcher  [address] [address]              7/22/2021        Dear Terrance,        We tried to reach you but were unsuccessful. Dr. Amezquita prescribed a limited refill of your lexapro. You will need an appointment in clinic to receive further refills. Please call us at [phone] to schedule.       Thank you,  [facility]

## 2021-07-21 RX ORDER — ESCITALOPRAM OXALATE 20 MG/1
20 TABLET ORAL DAILY
Qty: 30 TABLET | Refills: 0 | Status: SHIPPED | OUTPATIENT
Start: 2021-07-21 | End: 2021-08-11

## 2021-07-21 NOTE — TELEPHONE ENCOUNTER
Limited refill    Provided 30d supply lexapro 20mg daily. Patient needs follow-up before that prescription runs out. Will not provide further refills without follow-up.    Routing to triage RNs for assistance scheduling follow-up knowing FD is booked and this is quite important that he gets in for follow-up within 1 month: re depression and substance use disorder.     Jessica Amezquita MD

## 2021-07-21 NOTE — TELEPHONE ENCOUNTER
SW tried calling Terrance to relay the below information. His phone was not available to take calls and did not give an option to leave voicemail.    RAFIQ Villarreal  Pronouns: She/Her/Hers  , Care Coordination  [facility]  [phone]

## 2021-07-22 NOTE — TELEPHONE ENCOUNTER
RN left  with name and callback number. Please relay when pt calls back. Will send letter    Charleen Mann RN

## 2021-08-11 ENCOUNTER — OFFICE VISIT (OUTPATIENT)
Dept: FAMILY MEDICINE | Facility: CLINIC | Age: 31
End: 2021-08-11
Payer: COMMERCIAL

## 2021-08-11 VITALS
SYSTOLIC BLOOD PRESSURE: 116 MMHG | BODY MASS INDEX: 32.28 KG/M2 | HEIGHT: 68 IN | HEART RATE: 81 BPM | TEMPERATURE: 97.9 F | OXYGEN SATURATION: 95 % | RESPIRATION RATE: 16 BRPM | WEIGHT: 213 LBS | DIASTOLIC BLOOD PRESSURE: 73 MMHG

## 2021-08-11 DIAGNOSIS — L40.9 PSORIASIS: ICD-10-CM

## 2021-08-11 DIAGNOSIS — F41.1 GAD (GENERALIZED ANXIETY DISORDER): ICD-10-CM

## 2021-08-11 DIAGNOSIS — Z23 HIGH PRIORITY FOR 2019-NCOV VACCINE: ICD-10-CM

## 2021-08-11 DIAGNOSIS — F33.3 SEVERE EPISODE OF RECURRENT MAJOR DEPRESSIVE DISORDER, WITH PSYCHOTIC FEATURES (H): Primary | ICD-10-CM

## 2021-08-11 PROCEDURE — 0011A COVID-19,PF,MODERNA: CPT | Performed by: STUDENT IN AN ORGANIZED HEALTH CARE EDUCATION/TRAINING PROGRAM

## 2021-08-11 PROCEDURE — 91301 COVID-19,PF,MODERNA: CPT | Performed by: STUDENT IN AN ORGANIZED HEALTH CARE EDUCATION/TRAINING PROGRAM

## 2021-08-11 PROCEDURE — 99214 OFFICE O/P EST MOD 30 MIN: CPT | Mod: 25 | Performed by: STUDENT IN AN ORGANIZED HEALTH CARE EDUCATION/TRAINING PROGRAM

## 2021-08-11 RX ORDER — TRIAMCINOLONE ACETONIDE 1 MG/ML
LOTION TOPICAL 2 TIMES DAILY
Qty: 60 ML | Refills: 1 | Status: ON HOLD | OUTPATIENT
Start: 2021-08-11 | End: 2021-10-08

## 2021-08-11 RX ORDER — HYDROXYZINE HYDROCHLORIDE 25 MG/1
25 TABLET, FILM COATED ORAL DAILY PRN
Qty: 30 TABLET | Refills: 0 | Status: ON HOLD | OUTPATIENT
Start: 2021-08-11 | End: 2021-10-08

## 2021-08-11 RX ORDER — ESCITALOPRAM OXALATE 20 MG/1
20 TABLET ORAL DAILY
Qty: 90 TABLET | Refills: 1 | Status: ON HOLD | OUTPATIENT
Start: 2021-08-11 | End: 2021-10-08

## 2021-08-11 RX ORDER — CLOBETASOL PROPIONATE 0.05 G/100ML
SHAMPOO TOPICAL
Qty: 118 ML | Refills: 0 | Status: ON HOLD | OUTPATIENT
Start: 2021-08-11 | End: 2021-10-08

## 2021-08-11 ASSESSMENT — MIFFLIN-ST. JEOR: SCORE: 1899.28

## 2021-08-11 NOTE — Clinical Note
I couldn't find the GeneSite testing I'm sorry! He seemed to be doing well (a little bit odd affect) but said all symptoms were better, not using, happy being at [facility] now and not planning to leave anymore. I refilled the lexapro 20 mg (he wanted to go up but I told him that's usually max). He also wanted hydroxyzine which I saw he has had for panic attacks before so I did do that feel free to yell at me if that was wrong. My real victory was getting him the COVID19 vaccine! And maybe $100! Yay! -J

## 2021-08-11 NOTE — PROGRESS NOTES
"  Assessment & Plan     Severe episode of recurrent major depressive disorder, with psychotic features (H), improved  RAKESH (generalized anxiety disorder), improved  31 year old male seen for follow up of depression and anxiety 6 weeks after starting lexapro, doing well. Reports improvement of all depressive and anxiety symptoms. Did express interest in increasing lexapro dose, discussed that 20 mg daily is typically max dose (could consider 30 mg but will defer to PCP). He expressed understanding, requested hydroxyzine for panic attacks (has had one since starting lexapro which is an improvement). Review of chart shows he has used hydroxyzine PRN in the past. No SI/HI. Reports feeling better about his living situation at [facility], [facility] since 7/22/21. Looking forward to starting trade school in the fall.     I was unable to find patient's GeneSite testing-- There is a \"Misc Other\" lab in [facility]where from [facility] 7/12/2019 with comment to \"see scanned GeneSight report\" however, there is no attachment available.     - hydrOXYzine (ATARAX) 25 MG tablet; Take 1 tablet (25 mg) by mouth daily as needed for anxiety  - escitalopram (LEXAPRO) 20 MG tablet; Take 1 tablet (20 mg) by mouth daily    Psoriasis, uncontrolled   Involving bilateral elbows, knees and scalp. Previously used steroid cream and \"some prescription shampoo\". Follow up if not improving.   - triamcinolone (KENALOG) 0.1 % external lotion; Apply topically 2 times daily Limit 2 weeks at a time  - clobetasol propionate (CLOBEX) 0.05 % external shampoo; Limit use to one week for flares    Health care maintenance  COVID19 vaccine (Moderna #1) given today     Return in about 3 weeks (around 9/1/2021) for for 2nd covid vaccine.    Ivett Castaneda MD  [facility] WAG[facility] Carlos is a 31 year old who presents for the following health issues     HPI     Depression Followup  Lexapro 20 mg daily 30 day supply sent by PCP " "7/20/2021 -- PCP wanted to see him specifically. Last seen by PCP 6/24/2021 at which time lexapro was restarted. Complex social situation- experiences homelessness, meth use.      How are you doing with your depression since your last visit? Improved - feels like plateauing -- still overeating, energy \"I wish I had more\", sleeping fine, trouble concentrating still     Are you feeling anxious or having panic attacks?   Yes:  one panic attack since starting lexapro, feels like it has helped with anxiety a lot     Do you have any concerns with your use of alcohol or other drugs? No    Still at [facility], likes it now. What changed? \"Open mind\"  Sees a therapist, [facility] program working well for him  Has a sponsor, sober date 7/22/2021  Starting school in November- to be an [employer]    Social History     Tobacco Use     Smoking status: Former Smoker     Packs/day: 0.00     Smokeless tobacco: Former User   Substance Use Topics     Alcohol use: Not Currently     Drug use: Not Currently     PHQ 6/24/2021 6/29/2021 7/2/2021   PHQ-9 Total Score 24 24 4   Q9: Thoughts of better off dead/self-harm past 2 weeks Not at all Not at all Not at all     RAKESH-7 SCORE 6/24/2021 6/29/2021 7/2/2021   Total Score 15 15 11     Last PHQ-9 7/2/2021   1.  Little interest or pleasure in doing things 0   2.  Feeling down, depressed, or hopeless 0   3.  Trouble falling or staying asleep, or sleeping too much 1   4.  Feeling tired or having little energy 1   5.  Poor appetite or overeating 0   6.  Feeling bad about yourself 0   7.  Trouble concentrating 2   8.  Moving slowly or restless 0   Q9: Thoughts of better off dead/self-harm past 2 weeks 0   PHQ-9 Total Score 4   Difficulty at work, home, or with people Somewhat difficult         Objective    /73   Pulse 81   Temp 97.9  F (36.6  C) (Oral)   Resp 16   Ht 1.725 m (5' 7.91\")   Wt 96.6 kg (213 lb)   SpO2 95%   BMI 32.47 kg/m    Body mass index is 32.47 kg/m .  Physical " Exam  Constitutional:       General: He is not in acute distress.     Appearance: He is well-developed. He is not diaphoretic.   Cardiovascular:      Rate and Rhythm: Normal rate.   Pulmonary:      Effort: Pulmonary effort is normal. No respiratory distress.   Skin:     Comments: Round plaques involving the bilateral elbows and knees. Several plaques involving the scalp as well with overlying scale and flaking. No excoriations or significant erythema.   Neurological:      General: No focal deficit present.      Mental Status: He is alert.   Psychiatric:         Attention and Perception: Attention normal.         Mood and Affect: Affect is blunt.         Speech: Speech normal.         Behavior: Behavior normal.         Thought Content: Thought content does not include suicidal ideation.          No results found for this or any previous visit (from the past 24 hour(s)).

## 2021-08-12 ASSESSMENT — ANXIETY QUESTIONNAIRES
2. NOT BEING ABLE TO STOP OR CONTROL WORRYING: SEVERAL DAYS
IF YOU CHECKED OFF ANY PROBLEMS ON THIS QUESTIONNAIRE, HOW DIFFICULT HAVE THESE PROBLEMS MADE IT FOR YOU TO DO YOUR WORK, TAKE CARE OF THINGS AT HOME, OR GET ALONG WITH OTHER PEOPLE: SOMEWHAT DIFFICULT
6. BECOMING EASILY ANNOYED OR IRRITABLE: NOT AT ALL
1. FEELING NERVOUS, ANXIOUS, OR ON EDGE: NOT AT ALL
5. BEING SO RESTLESS THAT IT IS HARD TO SIT STILL: SEVERAL DAYS
3. WORRYING TOO MUCH ABOUT DIFFERENT THINGS: SEVERAL DAYS
7. FEELING AFRAID AS IF SOMETHING AWFUL MIGHT HAPPEN: NOT AT ALL
GAD7 TOTAL SCORE: 4

## 2021-08-12 ASSESSMENT — PATIENT HEALTH QUESTIONNAIRE - PHQ9
5. POOR APPETITE OR OVEREATING: SEVERAL DAYS
SUM OF ALL RESPONSES TO PHQ QUESTIONS 1-9: 7

## 2021-08-13 ASSESSMENT — ANXIETY QUESTIONNAIRES: GAD7 TOTAL SCORE: 4

## 2021-08-16 ENCOUNTER — TELEPHONE (OUTPATIENT)
Dept: FAMILY MEDICINE | Facility: CLINIC | Age: 31
End: 2021-08-16

## 2021-08-16 NOTE — TELEPHONE ENCOUNTER
[facility] Prior Authorization Team Request    Medication: Clobetasol 0.05% shampoo  Dosing: use as directed weekly for flare up  NDC (required for Medicaid members): [address]-18    [facility]  BIN: [id number]  PCN: [id number]  Grp: [id number]  ID: [id number]    CoverMyMeds Key (if applicable):     Additional documentation:       Andrew Colon @  [facility]   Phone Number: [phone]  Contact: [facility] (P [id number]) please send all responses to this pool.  Pharmacy NPI (required for Medicaid members):[id number]

## 2021-08-16 NOTE — TELEPHONE ENCOUNTER
Central Prior Authorization Team   Phone: [phone]    PA Initiation    Medication: Clobetasol shampoo  Insurance Company: [employer] - Phone [phone] Fax [phone]  Pharmacy Filling the Rx: [place], [place] - [address]  Filling Pharmacy Phone: [phone]  Filling Pharmacy Fax: [phone]  Start Date: 8/16/2021

## 2021-08-17 NOTE — TELEPHONE ENCOUNTER
Prior Authorization Approval    Authorization Effective Date: 7/17/2021  Authorization Expiration Date: 8/16/2022  Medication: Clobetasol shampoo-APPROVED  Approved Dose/Quantity:     Reference #:     Insurance Company: [employer] - Phone [phone] Fax [phone]  Expected CoPay:       CoPay Card Available:      Foundation Assistance Needed:    Which Pharmacy is filling the prescription (Not needed for infusion/clinic administered): [facility] [place], [place] - [address]  Pharmacy Notified: Yes  Patient Notified: Yes  **Instructed pharmacy to notify patient when script is ready to /ship.**

## 2021-09-08 ENCOUNTER — TELEPHONE (OUTPATIENT)
Dept: FAMILY MEDICINE | Facility: CLINIC | Age: 31
End: 2021-09-08

## 2021-09-08 NOTE — TELEPHONE ENCOUNTER
Pt is due for second covid shot on 09/09/21 or later. Patient was scheduled 09/08/21 which technically is too early and left a message to call clinic to reschedule this.   Pt did not make it to his appointment today 09/08/21 so if patient calls back he can reschedule this.

## 2021-09-30 ENCOUNTER — HOSPITAL ENCOUNTER (INPATIENT)
Facility: CLINIC | Age: 31
LOS: 10 days | Discharge: HOME | End: 2021-10-11
Attending: EMERGENCY MEDICINE | Admitting: PSYCHIATRY & NEUROLOGY
Payer: COMMERCIAL

## 2021-09-30 ENCOUNTER — TELEPHONE (OUTPATIENT)
Dept: BEHAVIORAL HEALTH | Facility: CLINIC | Age: 31
End: 2021-09-30

## 2021-09-30 DIAGNOSIS — G47.00 INSOMNIA, UNSPECIFIED TYPE: ICD-10-CM

## 2021-09-30 DIAGNOSIS — F33.9 RECURRENT MAJOR DEPRESSIVE DISORDER, REMISSION STATUS UNSPECIFIED (H): ICD-10-CM

## 2021-09-30 DIAGNOSIS — F41.1 GAD (GENERALIZED ANXIETY DISORDER): ICD-10-CM

## 2021-09-30 DIAGNOSIS — L40.9 PSORIASIS: ICD-10-CM

## 2021-09-30 DIAGNOSIS — Z20.3 NEED FOR POST EXPOSURE PROPHYLAXIS FOR RABIES: Primary | ICD-10-CM

## 2021-09-30 DIAGNOSIS — F33.3 SEVERE EPISODE OF RECURRENT MAJOR DEPRESSIVE DISORDER, WITH PSYCHOTIC FEATURES (H): ICD-10-CM

## 2021-09-30 DIAGNOSIS — F15.959: ICD-10-CM

## 2021-09-30 DIAGNOSIS — F29 PSYCHOSIS, UNSPECIFIED PSYCHOSIS TYPE (H): ICD-10-CM

## 2021-09-30 DIAGNOSIS — Z11.52 ENCOUNTER FOR SCREENING LABORATORY TESTING FOR SEVERE ACUTE RESPIRATORY SYNDROME CORONAVIRUS 2 (SARS-COV-2): ICD-10-CM

## 2021-09-30 DIAGNOSIS — F15.10 METHAMPHETAMINE ABUSE (H): ICD-10-CM

## 2021-09-30 DIAGNOSIS — F19.94 DRUG-INDUCED MOOD DISORDER (H): ICD-10-CM

## 2021-09-30 LAB
AMPHETAMINES UR QL SCN: ABNORMAL
BARBITURATES UR QL: ABNORMAL
BENZODIAZ UR QL: ABNORMAL
CANNABINOIDS UR QL SCN: ABNORMAL
COCAINE UR QL: ABNORMAL
OPIATES UR QL SCN: ABNORMAL
SARS-COV-2 RNA RESP QL NAA+PROBE: NEGATIVE

## 2021-09-30 PROCEDURE — U0003 INFECTIOUS AGENT DETECTION BY NUCLEIC ACID (DNA OR RNA); SEVERE ACUTE RESPIRATORY SYNDROME CORONAVIRUS 2 (SARS-COV-2) (CORONAVIRUS DISEASE [COVID-19]), AMPLIFIED PROBE TECHNIQUE, MAKING USE OF HIGH THROUGHPUT TECHNOLOGIES AS DESCRIBED BY CMS-2020-01-R: HCPCS | Performed by: EMERGENCY MEDICINE

## 2021-09-30 PROCEDURE — 90791 PSYCH DIAGNOSTIC EVALUATION: CPT

## 2021-09-30 PROCEDURE — 99285 EMERGENCY DEPT VISIT HI MDM: CPT | Performed by: EMERGENCY MEDICINE

## 2021-09-30 PROCEDURE — 99285 EMERGENCY DEPT VISIT HI MDM: CPT | Mod: 25 | Performed by: EMERGENCY MEDICINE

## 2021-09-30 PROCEDURE — C9803 HOPD COVID-19 SPEC COLLECT: HCPCS | Performed by: EMERGENCY MEDICINE

## 2021-09-30 PROCEDURE — 250N000013 HC RX MED GY IP 250 OP 250 PS 637: Performed by: EMERGENCY MEDICINE

## 2021-09-30 PROCEDURE — 80307 DRUG TEST PRSMV CHEM ANLYZR: CPT | Performed by: EMERGENCY MEDICINE

## 2021-09-30 RX ORDER — OLANZAPINE 5 MG/1
5 TABLET, ORALLY DISINTEGRATING ORAL ONCE
Status: COMPLETED | OUTPATIENT
Start: 2021-09-30 | End: 2021-09-30

## 2021-09-30 RX ORDER — ESCITALOPRAM OXALATE 10 MG/1
20 TABLET ORAL DAILY
Status: DISCONTINUED | OUTPATIENT
Start: 2021-10-01 | End: 2021-10-11 | Stop reason: HOSPADM

## 2021-09-30 RX ORDER — HYDROXYZINE HYDROCHLORIDE 25 MG/1
25 TABLET, FILM COATED ORAL DAILY PRN
Status: DISCONTINUED | OUTPATIENT
Start: 2021-09-30 | End: 2021-10-01

## 2021-09-30 RX ADMIN — OLANZAPINE 5 MG: 5 TABLET, ORALLY DISINTEGRATING ORAL at 07:40

## 2021-09-30 ASSESSMENT — ENCOUNTER SYMPTOMS
FEVER: 0
CONFUSION: 0
ARTHRALGIAS: 0
SHORTNESS OF BREATH: 0
COLOR CHANGE: 0
ABDOMINAL PAIN: 0
HEADACHES: 0
NECK STIFFNESS: 0
EYE REDNESS: 0
DIFFICULTY URINATING: 0

## 2021-09-30 NOTE — ED TRIAGE NOTES
Pt walked in by self, having delusions, auditory hallucinations that he appears to be responding.  Pt exhibiting a twitch, minimally answering questions.  Pt has numerous small scabs on upper extremities and Pt declined to answer questions regarding substance abuse.  Pt stated he did have a previous Rx for Zyprexa, does not know what MD prescribed it, but he has been out for a while.

## 2021-09-30 NOTE — ED PROVIDER NOTES
"ED Provider Note  [facility]      History     Chief Complaint   Patient presents with     Hallucinations     Pt stated he is having auditory hallucinations.     Delusional     Pt reports being psychotic and delusional.     HPI  Terrance Fletcher is a 31 year old male with a history of generalized anxiety disorder, major depressive disorder, methamphetamine use disorder, antisocial personality, severe methamphetamine use who presents after walking in stating\" I am psychotic\".  Patient notes regular use of methamphetamine.  He states that he was brought in by [facility].  He is requesting Zyprexa.  Past Medical History  Past Medical History:   Diagnosis Date     Antisocial personality disorder (H)      Depressive disorder      Hallucinations      Poison ivy 3 wk ago     Psoriasis      Psychosis (H)      Substance abuse (H)     polysubstance     Past Surgical History:   Procedure Laterality Date     COSMETIC SURGERY      to face     clobetasol propionate (CLOBEX) 0.05 % external shampoo  escitalopram (LEXAPRO) 20 MG tablet  hydrOXYzine (ATARAX) 25 MG tablet  triamcinolone (KENALOG) 0.1 % external lotion      No Known Allergies  Family History  Family History   Problem Relation Age of Onset     Diabetes Mother         type 1     Cancer - colorectal Father 40        onset in 40th     Coronary Artery Disease Early Onset Maternal Grandmother 30     Hypertension Maternal Uncle      Diabetes Maternal Uncle      Social History   Social History     Tobacco Use     Smoking status: Former Smoker     Packs/day: 0.00     Smokeless tobacco: Former User   Substance Use Topics     Alcohol use: Not Currently     Drug use: None     Comment: Pt refuses to answer question regarding substance usage      Past medical history, past surgical history, medications, allergies, family history, and social history were reviewed with the patient. No additional pertinent items.       Review of Systems "   Constitutional: Negative for fever.   HENT: Negative for congestion.    Eyes: Negative for redness.   Respiratory: Negative for shortness of breath.    Cardiovascular: Negative for chest pain.   Gastrointestinal: Negative for abdominal pain.   Genitourinary: Negative for difficulty urinating.   Musculoskeletal: Negative for arthralgias and neck stiffness.   Skin: Negative for color change.   Neurological: Negative for headaches.   Psychiatric/Behavioral: Negative for confusion.     A complete review of systems was performed with pertinent positives and negatives noted in the HPI, and all other systems negative.    Physical Exam   BP: 108/76  Pulse: 84  Temp: 98.3  F (36.8  C)  Resp: 16  Weight: 95.3 kg (210 lb)  SpO2: 97 %  Physical Exam  Constitutional:       General: He is not in acute distress.     Appearance: He is not diaphoretic.   HENT:      Head: Atraumatic.   Eyes:      General: No scleral icterus.     Pupils: Pupils are equal, round, and reactive to light.   Cardiovascular:      Heart sounds: Normal heart sounds.   Pulmonary:      Effort: No respiratory distress.      Breath sounds: Normal breath sounds.   Abdominal:      General: Bowel sounds are normal.      Palpations: Abdomen is soft.      Tenderness: There is no abdominal tenderness.   Musculoskeletal:         General: No tenderness.   Skin:     General: Skin is warm.      Findings: No rash.          Psychiatric:         Attention and Perception: He perceives auditory hallucinations.         Thought Content: Thought content is delusional.         Judgment: Judgment is impulsive and inappropriate.         ED Course      Procedures       The medical record was reviewed and interpreted.       No results found for any visits on 09/30/21.  Medications   OLANZapine zydis (zyPREXA) ODT tab 5 mg (5 mg Oral Given 9/30/21 6640)        Assessments & Plan (with Medical Decision Making)   31-year-old male who presents claiming psychosis with auditory  hallucinations.  Differential includes intoxication, withdrawal, primary mental illness, malingering.  Exam reveals irritable male requesting to sleep.  Patient is not endorsing suicidal or homicidal ideation.  Urine toxicology is pending.  Patient was given Zyprexa in the emergency room and will be reassessed once he awakes.    I have reviewed the nursing notes. I have reviewed the findings, diagnosis, plan and need for follow up with the patient.    New Prescriptions    No medications on file       Final diagnoses:   Drug-induced mood disorder (H)       --  Dennis Grijalva MD  [facility] EMERGENCY DEPARTMENT  9/30/2021     Dennis Grijalva MD  09/30/21 8415

## 2021-09-30 NOTE — DISCHARGE INSTRUCTIONS
Behavioral Discharge Planning and Instructions    Summary: You were admitted on 9/30/2021  due to suicidal ideations and Chemical Use Issues.  You were treated by Kerri FELIX and [facility] your mom's home with the plan to go to the Substance Abuse Treatment Program [facility] on 10/12/21.    Main Diagnosis:   Substance-induced psychosis (methamphetamine, cannabis)  Methamphetamine use disorder  Cannabis use disorder  Nicotine use disorder  Psoriasis    You were seen for a dog bite and received 2 rabies vaccine injections on 10/6 and 10/9.  Please go to an ER on 10/13 and again on 10/20 to receive the final 2 injections.      Health Care Follow-up:   Residential Substance Use Treatment  Admission date/time:  Tuesday October 12, 2021 at 12:00pm  Provider:  [facility]  Address:  [address] [place] [id number]   [phone]/Fax [phone]    [facility] coordinator/:   Tamera Arriola ([phone])     Provider: Jessica Amezquita  Location:   [facility] through [facility] [address], [address]  [place] [address]  Phone: [phone]  Fax: [phone]    Attend all scheduled appointments with your outpatient providers. Call at least 24 hours in advance if you need to reschedule an appointment to ensure continued access to your outpatient providers.     Major Treatments, Procedures and Findings:  You were provided with: a psychiatric assessment, assessed for medical stability, medication evaluation and/or management, group therapy, CD evaluation/assessment and milieu management    Symptoms to Report: feeling more aggressive, increased confusion, losing more sleep, mood getting worse or thoughts of suicide    Early warning signs can include: increased depression or anxiety sleep disturbances increased thoughts or behaviors of suicide or self-harm  increased unusual thinking, such as paranoia or  "hearing voices    Safety and Wellness:  Take all medicines as directed.  Make no changes unless your doctor suggests them.      Follow treatment recommendations.  Refrain from alcohol and non-prescribed drugs.  Ask your support system to help you reduce your access to items that could harm yourself or others. If there is a concern for safety, call 911.    Resources:   Crisis Intervention: 637.972.9169 or 813-861-4435 (TTY: 186.315.2995).  Call anytime for help.  National [employer]el on Mental Illness (www.[place].sung.org): 225.378.2643 or 182-054-7096.  National Suicide Prevention Line (www.mentalhealthmn.org): 538-795-LFHI (8070)  Mental Health Consumer/Survivor Network of [place] (www.mhcsn.net): 352.194.5507 or 464-692-1726  Mental Health Association of [place] (www.mentalhealth.org): 265.434.8867 or 482-867-7938  [facility] Crisis Response 214-368-3613  [facility] Crisis Response 202-211-6325  [facility] Crisis (COPE) Response - Adult [phone]  [facility] Crisis Response - Adult [phone]  Crisis text line: Text \"[place]\" to 424846. Free, confidential, 24/7.    [place] [facility] Connection   [facility] Connection works to ensure that all who seek it have access to the support, care and resources they need to achieve long-term recovery from addiction.  [facility]'s mission is to strengthen the recovery community through kdgz-hz-askp support, public education, and advocacy.  208.535.5485  www.[facility].org    [facility] ([facility])  [address] [place] [address]  Clinic [phone]    Services combine mental, chemical and physical health care.  Individuals receive a care coordinator to plan a comprehensive treatment plan that can include:  -Substance abuse and mental health treatment  -Screening, assessment, and diagnosis  -Primary care  -Patient-centered treatment planning  -Psychiatric rehabilitation  -Case management  -Peer and family support  -Crisis " services  -Support for veterans and active     [facility] Triage Services  Triage services helps people who have an urgent concern related to their mental health or substance use disorder connect to case management to help them get the support and services they need.      Care coordinators, peer recovery specialists, and health professionals address clients' physical health, mental health, addiction issues including housing, health insurance, general financial assistance, Rule 25 assessments and other resources.  Services are available on a walk-in basis.    Hours:10:00 am - 5:00 pm  Phone Number: [phone]  Location: [address] [place] [address], room [address]141     General Medication Instructions:   See your medication sheet(s) for instructions.   Take all medicines as directed.  Make no changes unless your doctor suggests them.   Go to all your doctor visits.  Be sure to have all your required lab tests. This way, your medicines can be refilled on time.  Do not use any drugs not prescribed by your doctor.  Avoid alcohol.    Advance Directives:   Scanned document on file with [employer]? No scanned doc  Is document scanned? Pt states no documents  Honoring Choices Your Rights Handout: Informed and given  Was more information offered? Materials given    The Treatment team has appreciated the opportunity to work with you. If you have any questions or concerns about your recent admission, you can contact the unit which can receive your call 24 hours a day, 7 days a week. They will be able to get in touch with a Provider if needed. The unit number is [phone]

## 2021-10-01 PROBLEM — F15.10 METHAMPHETAMINE ABUSE (H): Status: ACTIVE | Noted: 2021-10-01

## 2021-10-01 PROBLEM — F19.94 DRUG-INDUCED MOOD DISORDER (H): Status: ACTIVE | Noted: 2021-10-01

## 2021-10-01 PROCEDURE — 124N000002 HC R&B MH UMMC

## 2021-10-01 PROCEDURE — 250N000013 HC RX MED GY IP 250 OP 250 PS 637: Performed by: EMERGENCY MEDICINE

## 2021-10-01 PROCEDURE — 250N000013 HC RX MED GY IP 250 OP 250 PS 637: Performed by: NURSE PRACTITIONER

## 2021-10-01 PROCEDURE — 99223 1ST HOSP IP/OBS HIGH 75: CPT | Mod: AI | Performed by: NURSE PRACTITIONER

## 2021-10-01 RX ORDER — OLANZAPINE 10 MG/1
10 TABLET ORAL 3 TIMES DAILY PRN
Status: DISCONTINUED | OUTPATIENT
Start: 2021-10-01 | End: 2021-10-06

## 2021-10-01 RX ORDER — TRAZODONE HYDROCHLORIDE 50 MG/1
50 TABLET, FILM COATED ORAL
Status: DISCONTINUED | OUTPATIENT
Start: 2021-10-01 | End: 2021-10-11 | Stop reason: HOSPADM

## 2021-10-01 RX ORDER — TRIAMCINOLONE ACETONIDE 1 MG/ML
LOTION TOPICAL 2 TIMES DAILY
Status: DISCONTINUED | OUTPATIENT
Start: 2021-10-01 | End: 2021-10-11 | Stop reason: HOSPADM

## 2021-10-01 RX ORDER — OLANZAPINE 10 MG/2ML
10 INJECTION, POWDER, FOR SOLUTION INTRAMUSCULAR 3 TIMES DAILY PRN
Status: DISCONTINUED | OUTPATIENT
Start: 2021-10-01 | End: 2021-10-06

## 2021-10-01 RX ORDER — ACETAMINOPHEN 325 MG/1
650 TABLET ORAL EVERY 4 HOURS PRN
Status: DISCONTINUED | OUTPATIENT
Start: 2021-10-01 | End: 2021-10-11 | Stop reason: HOSPADM

## 2021-10-01 RX ORDER — OLANZAPINE 10 MG/1
10 TABLET ORAL AT BEDTIME
Status: DISCONTINUED | OUTPATIENT
Start: 2021-10-01 | End: 2021-10-06

## 2021-10-01 RX ORDER — NICOTINE 21 MG/24HR
1 PATCH, TRANSDERMAL 24 HOURS TRANSDERMAL DAILY
Status: DISCONTINUED | OUTPATIENT
Start: 2021-10-01 | End: 2021-10-11 | Stop reason: HOSPADM

## 2021-10-01 RX ORDER — HYDROXYZINE HYDROCHLORIDE 25 MG/1
25 TABLET, FILM COATED ORAL EVERY 4 HOURS PRN
Status: DISCONTINUED | OUTPATIENT
Start: 2021-10-01 | End: 2021-10-11 | Stop reason: HOSPADM

## 2021-10-01 RX ORDER — MAGNESIUM HYDROXIDE/ALUMINUM HYDROXICE/SIMETHICONE 120; 1200; 1200 MG/30ML; MG/30ML; MG/30ML
30 SUSPENSION ORAL EVERY 4 HOURS PRN
Status: DISCONTINUED | OUTPATIENT
Start: 2021-10-01 | End: 2021-10-11 | Stop reason: HOSPADM

## 2021-10-01 RX ORDER — AMOXICILLIN 250 MG
1 CAPSULE ORAL 2 TIMES DAILY PRN
Status: DISCONTINUED | OUTPATIENT
Start: 2021-10-01 | End: 2021-10-11 | Stop reason: HOSPADM

## 2021-10-01 RX ADMIN — TRIAMCINOLONE ACETONIDE: 1 LOTION TOPICAL at 20:11

## 2021-10-01 RX ADMIN — OLANZAPINE 10 MG: 10 TABLET, FILM COATED ORAL at 20:11

## 2021-10-01 RX ADMIN — ESCITALOPRAM OXALATE 20 MG: 10 TABLET ORAL at 09:39

## 2021-10-01 RX ADMIN — NICOTINE 1 PATCH: 21 PATCH, EXTENDED RELEASE TRANSDERMAL at 16:29

## 2021-10-01 RX ADMIN — NICOTINE POLACRILEX 2 MG: 2 GUM, CHEWING BUCCAL at 16:32

## 2021-10-01 ASSESSMENT — ACTIVITIES OF DAILY LIVING (ADL)
DRESS: INDEPENDENT
LAUNDRY: UNABLE TO COMPLETE
HYGIENE/GROOMING: INDEPENDENT
ORAL_HYGIENE: INDEPENDENT

## 2021-10-01 ASSESSMENT — MIFFLIN-ST. JEOR: SCORE: 1848.05

## 2021-10-01 NOTE — ED NOTES
9/30/2021  Terrance Fletcher 1990     [facility] Crisis Assessment:    Started at: 2145  Completed at: 2005  Patient was assessed via in-person.    Chief Complaint and History of Presenting Problem:    Terrance is a 31 year old male who presented to [facility] ED with concerns of meth induced psychosis and suicidal ideation. Pt arrived to the ED and was initially provided Zyprexa to manage symptoms of psychosis and then made statements about wanting to kill himself by jumping off the first bridge he found if discharged from the hospital. Pt does have hx of methamphetamine abuse and psychosis that he reports is present when he is using meth. He reports A/H hallucinations where he will hear his ex-wife's voice telling him that he is worthless and for him to kill himself. He has a past attempt where he turned on his car in an enclosed garage prior to his divorce in 2018. Pt expressed wanting to kill himself because he feels like no one cares about him and that 'I'm a worthless meth addict'. He was unable to contract for safety and wanted an inpatient psychiatric bed placement.     Assessment and intervention involved meeting with pt, [facility], employing crisis psychotherapy including: Establishing rapport, Active listening, Assess dimensions of crisis and Motivational Interviewing.    Biopsychosocial Background and Demographic Information    Pt is a 31 year old male who is currently homeless. He reports that he often utilizes the shelters in [place]. Pt has hx of being  and was  approximately two years ago.      Mental Health History and Current Symptoms     Pt has hx of methamphetamine abuse, psychosis which is exacerbated by his meth use, and hx of depression and anxiety. He reports A/H hallucinations where he will hear his ex-wife's voice telling him that he is worthless and for him to kill himself. Pt reports that he is prescribed Lexapro  which he has not been compliant with. He has hx of inpatient psychiatric placements with his last being at [facility] from 05/05-05/08/2019. According to epic chart review, he was seen at [facility] on 6/24/2021 and was provided information for a CD assessment. Pt does have hx of CD treatment. He has a past attempt where he turned on his car in an enclosed garage prior to his divorce in 2018. He is currently endorsing SI with a plan to jump off a bridge.       Mental Health History (prior psychiatric hospitalizations, civil commitments, programmatic care, etc): Multiple inpatient placements  Family Mental and Chemical Health History: Unknown at time of assessment.     Current and Historic Psychotropic Medications: Pt reports Lexapro  Medication Adherent: No  Recent medication changes? No    Relevant Medical Concerns  Patient identifies concerns with completing ADLs? No  Patient can ambulate independently? Yes  Other medical health concerns? No  History of concussion or TBI? No     Trauma History   Physical, Emotional, or Sexual abuse: No  Loss of a friend or family member to suicide: No  Other identified traumatic event or significant stressor: Homelessness    Substance Use History and Treatments  Pt has extensive hx of methamphetamine abuse. He reports that he last used meth two days ago, and also reported to using marijuana which last use yesterday. He has hx of CD treatments and detox visits.     Drug screen/BAL/Breathalyzer Completed? Yes. Ordered  Results: Pending     History of Suicidal Ideation, Suicide Attempts, Non-Suicidal Self Injury, and Risk Formulation:   Details of Current Ideation, Attempt(s), Plan(s): SI with plan to jump off a bridge  Risk factors:  history of suicide attempt(s), loss of relationship, separation/divorce, etc., poor interpersonal relationships, disengagement with or distrust of medical/mental health care, helplessness/hopelessness,  impulsivity/recklessness, history of or current substance use and Homelessness.   Protective factors:  community support and reality testing ability.  History and Prior Methods of Self-injury: Denies  History of Suicide Attempts: Hx of attempt in 2018    ESS-6  1.a. Over the past 2 weeks, have you had thoughts of killing yourself? Yes   1.b. Have you ever attempted to kill yourself and, if yes, when did this last happen? Yes 2018  2. Recent or current suicide plan? Yes  3. Recent or current intent to act on ideation? No 'Probably'  4. Lifetime psychiatric hospitalization? Yes  5. Pattern of excessive substance use? Yes  6. Current irritability, agitation, or aggression? No  ESS-6 Score: 4      Other Risk Areas  Aggressive/assumptive/homicidal risk factors: Yes: Impaired Self Control and Suicidal Threats   Sexually inappropriate behavior? No      Vulnerability to sexual exploitation? No     Clinical Presentation and Current Symptoms   Pt presented in ED for the assessment. He was sleeping and difficult to wake, frequently falling asleep between questions and was not compliant with the interview. Pt was awoken further and he sat up on bed to finish assessment. He would at times become irritable and his responses were quick and pt appeared irritated. At one point he expressed 'put me in the psych chang because I commit suicide'. He denied current hallucinations and continued to endorse SI.     Attention, Hyperactivity, and Impulsivity: No   Anxiety:Yes: Generalized Symptoms: Agitation and Avoidance    Behavioral Difficulties: Yes: Agitation and Impulsivity/Disinhibition   Mood Symptoms: Yes: Feelings of hopelessness , Feelings of worthlessness , Impaired concentration, Impaired decision making , Low self esteem , Risky behaviors, Sad, depressed mood  and Thoughts of suicide/death    Appetite: No   Feeding and Eating: No  Interpersonal Functioning: Yes: Emotional Deregulation, Impaired Impulse Control and Impaired  Interpersonal Functioning  Learning Disabilities/Cognitive/Developmental Disorders: No   General Cognitive Impairments: No  Sleep: No   Psychosis: Yes: Hallucinations: Auditory and Visual    Trauma: No       Mental Status Exam:  Affect: Blunted  Appearance: Disheveled   Attention Span/Concentration: Inattentive    Eye Contact: Avoidant  Fund of Knowledge: Appropriate   Language /Speech Content: Fluent  Language /Speech Volume: Loud   Language /Speech Rate/Productions: Pressured   Recent Memory: Variable  Remote Memory: Variable  Mood: Irritable   Orientation:   Person: Yes   Place: Yes  Time of Day: No   Date: No   Situation (Do they understand why they are here?): Yes   Psychomotor Behavior: Normal   Thought Content: Suicidal  Thought Form: Goal Directed      Current Providers and Contact Information   Patient is his own legal guardian.     Primary Care Provider: No  Psychiatrist: No  Therapist: No  : No  CTSS or ARMHS: No  ACT Team: No  Other: No    Has an CHARLETTE been signed? No     Clinical Summary and Recommendations    Clinical summary of assessment (include strengths, protective factors, community resources, and assessment of vulnerability/risk): Pt has hx of methamphetamine abuse, psychosis which is exacerbated by his meth use, and hx of depression and anxiety. He reports A/H hallucinations where he will hear his ex-wife's voice telling him that he is worthless and for him to kill himself. He is currently endorsing SI with a plan to jump off a bridge. He has a past attempt where he turned on his car in an enclosed garage prior to his divorce in 2018. He has hx of multiple inpatient psychiatric placements with his last in May 2019. Pt is unable to contract for safety and presents with symptoms of psychosis that is unmanaged. Pt would benefit from acute stabilization and will be referred for inpatient placement.       Diagnosis with F Codes:  F15.10 Meth Abuse  F33.9 Major Depressive Disorder,  Unspecified  F15.959 Other stimulant use, unspecified with stimulant-induced psychotic disorder, unspecified    Disposition  Attending provider, Dr. Mayes consulted and does  agree with recommended disposition which includes Inpatient Mental Health. Patient agrees with recommended level of care.      Details of final disposition include: Inpatient mental health .      If Inpatient, is patient admitted voluntary? Yes   Patient aware of potential for transfer if there is not appropriate placement? Yes  Patient is willing to travel outside of the [facility] for placement? No   Central Intake Notified? Yes: Date: 9/30/2021 Time: 2247.      Duration of assessment time: .25 hrs    CPT code(s) utilized: 96345, up to 74 minutes      Lior Hernandez, MICHAEL, LGSW

## 2021-10-01 NOTE — PROGRESS NOTES
10/01/21 1455   Patient Belongings   Did you bring any home meds/supplements to the hospital?  No   Patient Belongings locker;sent to security per site process   Patient Belongings Put in Hospital Secure Location (Security or Locker, etc.) cash/credit card;shoes;clothing   Belongings Search Yes   Clothing Search Yes   Second Staff Suri     Locker #[id number]    - black boots  - braided belt  - white socks  - orange hooded sweatshirt  - black pants  - cigarettes pack (no cigs; holds 's license)  - smoke device (broken)    Sent to Security:  Envelope #[id number]  - mastercard 0736  - vehicle title  .A               Admission:  I am responsible for any personal items that are not sent to the safe or pharmacy.  [facility] is not responsible for loss, theft or damage of any property in my possession.    Signature:  _________________________________ Date: _______  Time: _____                                              Staff Signature:  ____________________________ Date: ________  Time: _____      2nd Staff person, if patient is unable/unwilling to sign:    Signature: ________________________________ Date: ________  Time: _____     Discharge:  [facility] has returned all of my personal belongings:    Signature: _________________________________ Date: ________  Time: _____                                          Staff Signature:  ____________________________ Date: ________  Time: _____

## 2021-10-01 NOTE — TELEPHONE ENCOUNTER
S: 10:48 pm Pt is a 31 yr old male in [facility] ED for SI w/ plan to jump off a bridge and probably drug induced psychosis report by Lior    B: Pt reports AH and VH.  Pt reports using meth and marijuana.  Last reported ip admission was May 2019.  No current mh providers.  Pt is not med compliant.  No reported medical concerns.  COVID test collected.     A: vol     R: Pt waiting in ED for appropriate placement.  TC only.     Patient cleared and ready for behavioral bed placement: Yes

## 2021-10-01 NOTE — TELEPHONE ENCOUNTER
R:  32/Grady    Covid = Negative  Utox = + for Amphetamines and THC    9:12 Paged Unit 32 Provider  9:22 AM Grady approved for herself on 32  Updated Worklist and Added to Admit Board    9:32 AMUpdated Unit 32  9:39 AM Updated ED

## 2021-10-01 NOTE — ED NOTES
Writer asked pt to arrange for transportation for discharge. Pt states he is now feeling suicidal and is going to kill himself if he leaves.  Pt would not contract for safety.  Provider informed.

## 2021-10-01 NOTE — ED NOTES
[facility] ED Mental Health Handoff Note:       Brief HPI:  This is a 31 year old male signed out to me.  See initial ED Provider note for full details of the presentation. Interval history is pertinent for no acute issues overnight.    Home meds reviewed and ordered/administered: Yes    Medically stable for inpatient mental health admission: Yes.    Evaluated by mental health: Yes. The recommendation is for inpatient mental health treatment. Bed search in process    Safety concerns: At the time I received sign out, there were no safety concerns.    Hold Status:  Active Orders   N/A            Exam:   Patient Vitals for the past 24 hrs:   BP Temp Temp src Pulse Resp SpO2 Weight   09/30/21 2250 114/53 96.9  F (36.1  C) Oral 94 18 95 % --   09/30/21 1551 126/75 97.5  F (36.4  C) Oral 90 18 97 % --   09/30/21 0657 108/76 98.3  F (36.8  C) Oral 84 16 97 % 95.3 kg (210 lb)           ED Course:    Medications   escitalopram (LEXAPRO) tablet 20 mg (has no administration in time range)   hydrOXYzine (ATARAX) tablet 25 mg (has no administration in time range)   OLANZapine zydis (zyPREXA) ODT tab 5 mg (5 mg Oral Given 9/30/21 0740)            There were no significant events during my shift.    Patient was signed out to the oncoming provider, Dr. Mendoza.      Impression:    ICD-10-CM    1. Drug-induced mood disorder (H)  F19.94    2. Methamphetamine abuse (H)  F15.10        Plan:    1. Awaiting inpatient mental health admission/transfer.      RESULTS:   Results for orders placed or performed during the hospital encounter of 09/30/21 (from the past 24 hour(s))   Urine Drugs of Abuse Screen     Status: Abnormal    Collection Time: 09/30/21 11:01 PM    Narrative    The following orders were created for panel order Urine Drugs of Abuse Screen.  Procedure                               Abnormality         Status                     ---------                               -----------         ------                     Drug  abuse screen 1 urin...[[id number]]  Abnormal            Final result                 Please view results for these tests on the individual orders.   Drug abuse screen 1 urine (ED)     Status: Abnormal    Collection Time: 09/30/21 11:01 PM   Result Value Ref Range    Amphetamines Urine Screen Positive (A) Screen Negative    Barbiturates Urine Screen Negative Screen Negative    Benzodiazepines Urine Screen Negative Screen Negative    Cannabinoids Urine Screen Positive (A) Screen Negative    Cocaine Urine Screen Negative Screen Negative    Opiates Urine Screen Negative Screen Negative   Asymptomatic COVID-19 Virus (Coronavirus) by PCR Nasopharyngeal     Status: Normal    Collection Time: 09/30/21 11:03 PM    Specimen: Nasopharyngeal; Swab   Result Value Ref Range    SARS CoV2 PCR Negative Negative    Narrative    Testing was performed using the ricky  SARS-CoV-2 & Influenza A/B Assay on the ricky  Cira  System.  This test should be ordered for the detection of SARS-COV-2 in individuals who meet SARS-CoV-2 clinical and/or epidemiological criteria. Test performance is unknown in asymptomatic patients.  This test is for in vitro diagnostic use under the FDA EUA for laboratories certified under CLIA to perform moderate and/or high complexity testing. This test has not been FDA cleared or approved.  A negative test does not rule out the presence of PCR inhibitors in the specimen or target RNA in concentration below the limit of detection for the assay. The possibility of a false negative should be considered if the patient's recent exposure or clinical presentation suggests COVID-19.  [facility] are certified under the Clinical Laboratory Improvement Amendments of 1988 (CLIA-88) as qualified to perform moderate and/or high complexity laboratory testing.             MD Hue Palencia, Tiara Lund MD  10/01/21 0507

## 2021-10-01 NOTE — PROGRESS NOTES
Provider called and was notified pt will be arriving on unit shortly and orders need to be placed.  Per provider she will see this patient today.  Pt arrived on unit at 2 PM.

## 2021-10-01 NOTE — H&P
History and Physical    Terrance Fletcher MRN# [id number]   Age: 31 year old YOB: 1990     Date of Admission:  10/1/2021          Contacts:     PCP - Dr. Jessica Amezquita [facility]         Diagnoses:     Substance-induced psychosis (methamphetamine, cannabis)  Likely component of malingering  History of major depressive disorder  Rule out antisocial personality disorder  Methamphetamine use disorder  Cannabis use disorder  Nicotine use disorder  Psoriasis         Recommendations:     Admit to Unit: 32N    Attending Physician: Dr. Lanza, under the direct care of Kerri Rasmussen NP    Patient is voluntary.    Routine lab studies have been requested.    Monitor for target symptoms.     Provide a safe environment and therapeutic milieu.     Medications:  The patient was unable or unwilling to definitively state whether he has been taking any medications recently.  He says he historically took Lexapro, received 20 mg in the ER and would like to continue it.  He also requested Zyprexa 10 mg at HS.  PRNs of Zyprexa, Hydroxyzine and Trazodone are available.      He is uncertain whether he can return to [facility].  States he would like to discharge to [facility] or another  treatment program.      Attestation:  Patient has been seen and evaluated by me, Tanya Rasmussen, APRN CNP  The patient was counseled on nature of illness and treatment plan/options  Care was coordinated with treatment team       Clinical Global Impressions  First:  Considering your total clinical experience with this particular patient population, how severe are the patient's symptoms at this time?: 6 (10/01/21 1436)  Compared to the patient's condition at the START of treatment, this patient's condition is: 4 (10/01/21 1436)  Most recent:  Considering your total clinical experience with this particular patient population, how severe are the patient's symptoms at this time?: 6 (10/01/21 1436)  Compared to the patient's  "condition at the START of treatment, this patient's condition is: 4 (10/01/21 1436)           Chief Complaint:     History is obtained from the patient and electronic health record.    \"Hearing voices, being delusional.\"         History of Present Illness:        Terrance Fletcher is a 31-year-old male admitted to [facility] on 10/1/2021.  He was admitted as a voluntary patient through the ER due to suicidal ideation and psychosis in the context of methamphetamine use.  He is homeless.  He has been in CD treatment at [facility] and is uncertain whether he can return.  After being advised to make plans to discharge from the ER, he reported suicidal ideation with a plan to jump off a bridge.  UTOX was positive for cannabinoids and amphetamines.  He reports smoking meth and cannabis about 2 times per week.  He provided conflicting information regarding medication adherence.  He said he was \"maybe\" taking Lexapro and that he was not prescribed Zyprexa recently but would like to take it.  He was lying in bed with his eyes closed, guarded and participated minimally in the assessment.          Psychiatric Review of Systems:      He reports auditory hallucinations of his ex-wife's voice telling him he is worthless and should commit suicide.  He also reports that auditory hallucinations are command in nature and tell him that if he does not follow their commands they will kill him.  When asked about specific content of commands, he replied, \"I don't know.\"  He reports paranoid thoughts about people following him in cars.  He endorses homicidal ideation and then responded \"I don't know\" when asked about a particular target, plan and intent.  He reports suicidal ideation with a plan to crash his motorcycle.  He contracts for safety on the unit.  He states his mood is depressed.  He endorses anhedonia.  Energy is low.  Concentration is impaired.  He feels hopeless.  Sleep is \"good.\"  Appetite is \"fine.\"  He denies " symptoms consistent with OCD, sherrell and PTSD.         Medical Review of Systems:     A 10-point review of systems was completed and is negative with the exception of HPI, though ER notes indicate psoriatic lesions on his elbows and scabbing and excoriated areas on his face.           Psychiatric History:     He has a history of depression, anxiety, substance-induced psychosis and antisocial personality disorder.  He was hospitalized at [facility] in 1/2007, 6/2011 and 10/2018 and at [facility] in 5/2019.  He attempted suicide by carbon monoxide poisoning after his divorce in 2018.  He has a history of self-injury by cutting and hitting himself.  Past medications include Lexapro, Hydroxyzine, Zyprexa, Trazodone, Lexapro, Ritalin, Concerta, Adderall, Strattera and Seroquel.             Substance Use History:     Records indicate a history of alcohol abuse.  He endorses use of cannabis and meth about twice weekly.  He smokes 1/2 pack of cigarettes daily.  He reports a history of 13 CD treatments.  He reports a history of 2 DWIs.          Past Medical History:     Psoriasis  GERD         Past Surgical History:     Cosmetic surgery, per chart review, though the patient denies any history of surgery         Allergies:      No known allergies           Medications:       clobetasol propionate (CLOBEX) 0.05 % external shampoo Limit use to one week for flares     escitalopram (LEXAPRO) 20 MG tablet Take 1 tablet (20 mg) by mouth daily     hydrOXYzine (ATARAX) 25 MG tablet Take 1 tablet (25 mg) by mouth daily as needed for anxiety     triamcinolone (KENALOG) 0.1 % external lotion Apply topically 2 times daily.  Limit 2 weeks at a time          Social History:     He was raised by his mother and stepfather.  He has 3 younger siblings.  He graduated high school.  He has not been employed in quite some time.  He was  in 2018.  He has a son.  He is homeless and has been staying in shelters, though was recently  "at [facility].  He has a history of 2 DWIs.          Family History:     Per records, one brother has a history of mild autism spectrum.  Another brother has a history of ADHD.  His father has a history of alcohol use disorder.  Per conversation with the patient, \"I don't know.\"          Labs:      Ref. Range 9/30/2021 23:01 9/30/2021 23:03   SARS CoV2 PCR Latest Ref Range: Negative   Negative   Amphetamine Qual Urine Latest Ref Range: Screen Negative  Screen Positive (A)    Cocaine Qual Urine Latest Ref Range: Screen Negative  Screen Negative    Benzodiazepine Qual Ur Latest Ref Range: Screen Negative  Screen Negative    Opiates Qualitative Urine Latest Ref Range: Screen Negative  Screen Negative    Cannabinoids Qual Urine Latest Ref Range: Screen Negative  Screen Positive (A)    Barbiturates Qual Urine Latest Ref Range: Screen Negative  Screen Negative             Psychiatric Examination:     Appearance:  awake, somnolent, disheveled, scabs and excoriated areas on face  Attitude:  guarded and mostly uncooperative  Eye Contact:  none, eyes closed while lying in bed  Mood:  depressed  Affect:  intensity is blunted  Speech:  clear, coherent, minimal spontaneous speech  Psychomotor Behavior:  no evidence of tardive dyskinesia, dystonia, or tics  Thought Process:  linear and goal oriented  Associations:  no loose associations  Thought Content:  reports suicidal ideation with a plan to crash his motorcycle and contracts for safety on the unit, reports homicidal ideation but responded \"I don't know\" when asked for specific info, denies visual hallucinations, reports auditory hallucinations, reports paranoia regarding being followed  Insight:  limited  Judgment:  fair  Oriented to:  month/year, person, aware he is in a psych unit in [place]  Attention Span and Concentration:  limited  Recent and Remote Memory:  limited  Language:  intact, fluent English  Fund of Knowledge:  appropriate  Muscle Strength and Tone:  " normal  Gait and Station:  lying in bed, gait not observed     BP 99/62   Pulse 86   Temp 98.4  F (36.9  C) (Oral)   Resp 16   Wt 95.3 kg (210 lb)   SpO2 94%   BMI 32.01 kg/m           Physical Exam:     Please refer to the physical exam completed by Dr. Grijalva in the ER 9/30/2021:    Constitutional:       General: He is not in acute distress.     Appearance: He is not diaphoretic.   HENT:      Head: Atraumatic.   Eyes:      General: No scleral icterus.     Pupils: Pupils are equal, round, and reactive to light.   Cardiovascular:      Heart sounds: Normal heart sounds.   Pulmonary:      Effort: No respiratory distress.      Breath sounds: Normal breath sounds.   Abdominal:      General: Bowel sounds are normal.      Palpations: Abdomen is soft.      Tenderness: There is no abdominal tenderness.   Musculoskeletal:         General: No tenderness.   Skin:     General: Skin is warm.      Findings: No rash.   Psychiatric:         Attention and Perception: He perceives auditory hallucinations.         Thought Content: Thought content is delusional.         Judgment: Judgment is impulsive and inappropriate.

## 2021-10-01 NOTE — PHARMACY-ADMISSION MEDICATION HISTORY
Admission Medication History Completed by Pharmacy    See [facility] Admission Navigator for allergy information, preferred outpatient pharmacy, prior to admission medications and immunization status.     Medication history sources:  Surescripts dispense report; Clinic visit note from 8/11/21 with Dr. Castaneda; MAR for last doses     Pertinent changes made to PTA medication list:  Added: N/A  Deleted: N/A  Changed: N/A    Additional medication history information:   - Unable to interview patient at this time due to current MH state. Additional OTC/supplement medications may not be reflected in the list below.     Last pharmacy fill dates are as followed:   - Escitalopram 20 mg tablets: 90 tablets for 90 day supply; last dispensed 8/13/21  - Hydroxyzine 25 mg tablets: 30 tablets for 30 day supply; last dispensed 8/11/21   - Triamcinolone 0.1% lotion: 60 gram tube for 30 day supply; last dispensed 8/11/21    Prior to Admission medications    Medication Sig Last Dose Taking? Auth Provider   clobetasol propionate (CLOBEX) 0.05 % external shampoo Limit use to one week for flares More than a month at Unknown time Yes Amber Connell, DO   escitalopram (LEXAPRO) 20 MG tablet Take 1 tablet (20 mg) by mouth daily 10/1/2021 Yes Amber Connell, DO   hydrOXYzine (ATARAX) 25 MG tablet Take 1 tablet (25 mg) by mouth daily as needed for anxiety Past Month Yes Amber Connell, DO   triamcinolone (KENALOG) 0.1 % external lotion Apply topically 2 times daily Limit 2 weeks at a time Past Month Yes Amber Connell DO          Date completed: 10/01/21    Medication history completed by:   Savana Ding, PharmD   [facility]  [phone]
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
## 2021-10-01 NOTE — ED NOTES
[facility] ED Mental Health Handoff Note:       Brief HPI:  This is a 31 year old male signed out to me by Dr. Grijalva.  See initial ED Provider note for full details of the presentation.     Home meds reviewed and ordered/administered: Yes    Medically stable for inpatient mental health admission: Yes.    Evaluated by mental health: No. Awaiting clinical sobriety before evaluation.    Safety concerns: At the time I received sign out, there were no safety concerns.    Hold Status:  Active Orders   N/A           Exam:   Patient Vitals for the past 24 hrs:   BP Temp Temp src Pulse Resp SpO2 Weight   09/30/21 2250 114/53 96.9  F (36.1  C) Oral 94 18 95 % --   09/30/21 1551 126/75 97.5  F (36.4  C) Oral 90 18 97 % --   09/30/21 0657 108/76 98.3  F (36.8  C) Oral 84 16 97 % 95.3 kg (210 lb)           ED Course:    Medications   OLANZapine zydis (zyPREXA) ODT tab 5 mg (5 mg Oral Given 9/30/21 0740)            There were significant events during my shift.  Patient reached a point where he was able to be assessed after Zyprexa wore off.  Initial plan was to discharge, patient stated that if he was discharged he would kill himself.  Patient was assessed by the DEC , please refer to their note.  Patient reports that he has plan to kill himself if discharged and will follow through with this.  He has attempted to kill himself by carbon monoxide in the past.  Patient will be admitted on a voluntary basis.  If he changes his mind, he can be reassessed and potentially discharged if no longer suicidal.    Patient was signed out to the oncoming provider, Dr. Swann.      Impression:    ICD-10-CM    1. Drug-induced mood disorder (H)  F19.94    2. Methamphetamine abuse (H)  F15.10        Plan:    1. Awaiting inpatient mental health admission/transfer.      RESULTS:   Results for orders placed or performed during the hospital encounter of 09/30/21 (from the past 24 hour(s))   Urine Drugs of Abuse Screen     Status: None  (In process)    Collection Time: 09/30/21 11:01 PM    Narrative    The following orders were created for panel order Urine Drugs of Abuse Screen.  Procedure                               Abnormality         Status                     ---------                               -----------         ------                     Drug abuse screen 1 urin...[[id number]]                      In process                   Please view results for these tests on the individual orders.             MD Sugey Saxena Gregory Townley, MD  09/30/21 1839

## 2021-10-02 LAB
ALBUMIN SERPL-MCNC: 3.1 G/DL (ref 3.4–5)
ALP SERPL-CCNC: 61 U/L (ref 40–150)
ALT SERPL W P-5'-P-CCNC: 33 U/L (ref 0–70)
ANION GAP SERPL CALCULATED.3IONS-SCNC: 2 MMOL/L (ref 3–14)
AST SERPL W P-5'-P-CCNC: 12 U/L (ref 0–45)
BASOPHILS # BLD AUTO: 0.1 10E3/UL (ref 0–0.2)
BASOPHILS NFR BLD AUTO: 1 %
BILIRUB SERPL-MCNC: 0.3 MG/DL (ref 0.2–1.3)
BUN SERPL-MCNC: 16 MG/DL (ref 7–30)
CALCIUM SERPL-MCNC: 8.5 MG/DL (ref 8.5–10.1)
CHLORIDE BLD-SCNC: 113 MMOL/L (ref 94–109)
CHOLEST SERPL-MCNC: 142 MG/DL
CO2 SERPL-SCNC: 25 MMOL/L (ref 20–32)
CREAT SERPL-MCNC: 1 MG/DL (ref 0.66–1.25)
EOSINOPHIL # BLD AUTO: 0.3 10E3/UL (ref 0–0.7)
EOSINOPHIL NFR BLD AUTO: 4 %
ERYTHROCYTE [DISTWIDTH] IN BLOOD BY AUTOMATED COUNT: 13 % (ref 10–15)
GFR SERPL CREATININE-BSD FRML MDRD: >90 ML/MIN/1.73M2
GLUCOSE BLD-MCNC: 91 MG/DL (ref 70–99)
HCT VFR BLD AUTO: 43.7 % (ref 40–53)
HDLC SERPL-MCNC: 27 MG/DL
HGB BLD-MCNC: 14.3 G/DL (ref 13.3–17.7)
IMM GRANULOCYTES # BLD: 0 10E3/UL
IMM GRANULOCYTES NFR BLD: 0 %
LDLC SERPL CALC-MCNC: 83 MG/DL
LYMPHOCYTES # BLD AUTO: 2.5 10E3/UL (ref 0.8–5.3)
LYMPHOCYTES NFR BLD AUTO: 34 %
MCH RBC QN AUTO: 28.5 PG (ref 26.5–33)
MCHC RBC AUTO-ENTMCNC: 32.7 G/DL (ref 31.5–36.5)
MCV RBC AUTO: 87 FL (ref 78–100)
MONOCYTES # BLD AUTO: 0.6 10E3/UL (ref 0–1.3)
MONOCYTES NFR BLD AUTO: 9 %
NEUTROPHILS # BLD AUTO: 3.9 10E3/UL (ref 1.6–8.3)
NEUTROPHILS NFR BLD AUTO: 52 %
NONHDLC SERPL-MCNC: 115 MG/DL
NRBC # BLD AUTO: 0 10E3/UL
NRBC BLD AUTO-RTO: 0 /100
PLATELET # BLD AUTO: 235 10E3/UL (ref 150–450)
POTASSIUM BLD-SCNC: 4.3 MMOL/L (ref 3.4–5.3)
PROT SERPL-MCNC: 6 G/DL (ref 6.8–8.8)
RBC # BLD AUTO: 5.01 10E6/UL (ref 4.4–5.9)
SODIUM SERPL-SCNC: 140 MMOL/L (ref 133–144)
T4 FREE SERPL-MCNC: 1 NG/DL (ref 0.76–1.46)
TRIGL SERPL-MCNC: 160 MG/DL
TSH SERPL DL<=0.005 MIU/L-ACNC: 0.32 MU/L (ref 0.4–4)
WBC # BLD AUTO: 7.4 10E3/UL (ref 4–11)

## 2021-10-02 PROCEDURE — 84439 ASSAY OF FREE THYROXINE: CPT | Performed by: NURSE PRACTITIONER

## 2021-10-02 PROCEDURE — 85025 COMPLETE CBC W/AUTO DIFF WBC: CPT | Performed by: NURSE PRACTITIONER

## 2021-10-02 PROCEDURE — 250N000013 HC RX MED GY IP 250 OP 250 PS 637: Performed by: EMERGENCY MEDICINE

## 2021-10-02 PROCEDURE — 250N000013 HC RX MED GY IP 250 OP 250 PS 637: Performed by: NURSE PRACTITIONER

## 2021-10-02 PROCEDURE — 80053 COMPREHEN METABOLIC PANEL: CPT | Performed by: NURSE PRACTITIONER

## 2021-10-02 PROCEDURE — 36415 COLL VENOUS BLD VENIPUNCTURE: CPT | Performed by: NURSE PRACTITIONER

## 2021-10-02 PROCEDURE — 80061 LIPID PANEL: CPT | Performed by: NURSE PRACTITIONER

## 2021-10-02 PROCEDURE — 124N000002 HC R&B MH UMMC

## 2021-10-02 PROCEDURE — 84443 ASSAY THYROID STIM HORMONE: CPT | Performed by: NURSE PRACTITIONER

## 2021-10-02 RX ADMIN — ESCITALOPRAM OXALATE 20 MG: 10 TABLET ORAL at 08:53

## 2021-10-02 RX ADMIN — NICOTINE 1 PATCH: 21 PATCH, EXTENDED RELEASE TRANSDERMAL at 08:52

## 2021-10-02 RX ADMIN — TRIAMCINOLONE ACETONIDE: 1 LOTION TOPICAL at 20:55

## 2021-10-02 RX ADMIN — OLANZAPINE 10 MG: 10 TABLET, FILM COATED ORAL at 20:55

## 2021-10-02 ASSESSMENT — ACTIVITIES OF DAILY LIVING (ADL)
DRESS: INDEPENDENT
ORAL_HYGIENE: INDEPENDENT
HYGIENE/GROOMING: INDEPENDENT
HYGIENE/GROOMING: INDEPENDENT
DRESS: INDEPENDENT
ORAL_HYGIENE: INDEPENDENT
LAUNDRY: UNABLE TO COMPLETE

## 2021-10-02 NOTE — PLAN OF CARE
"Patient remain alert and oriented x4, denies pain and discomfort. Patient isolative today only came out for dinner and snack. Encourage by staff to attend group around 2000 but he refuse stated \"I'm tired\". Ate 100% of meal. Patient has been calm and cooperative, denies suicidal ideation and no anxiety or depression. Refuse shower today and stated he will take one tomorrow.      "

## 2021-10-02 NOTE — PLAN OF CARE
Patient slept approximately 7.25 hours during the overnight shift; Currently in bed, appears to be comfortably asleep with even and unlabored breathing. Nursing will continue to monitor.

## 2021-10-02 NOTE — PLAN OF CARE
"RN/    Patient is hopeful stating \"being here should help\" and stated GOAL is \"to get some sleep/rest.\"    Patient rates depression 0* c/o   Patient rates anxiety at 0* c/o   Patient describes mood as \" tired \" and affect is flat, restricted.  Patient is tense and irritable when approached.  Did not come out of room for any reason this shift.  Did not eat meals and when offered was uninterested.      Patient is guarded appearing Restricted and Blunted/Flat. Patient is med-compliant but refused nicotine patch as well as topical cream for psoriatic lesions.  Patient is eating 0% and reports \"sleeps through the night and taking naps\". Patient is not attending groups.    Patient denies current or recent suicidal ideation    SIB denies    HI: denies current or recent homicidal ideation or behaviors.  Patient acknowledges auditory hallucinations.  Patient denies visual hallucinations.    VS reviewed: /84   Pulse 88   Temp 97.6  F (36.4  C) (Temporal)   Resp 16   Ht 1.702 m (5' 7\")   Wt 92.9 kg (204 lb 14.4 oz)   SpO2 94%   BMI 32.09 kg/m   . Patient denies  pain.    Patient evaluation continues. Assessed mood,anxiety,thoughts and behavior. Patient is progressing towards his goal of getting sleep/rest but is so guarded/withdrawn or so extremely fatigued at this time that it is difficult to assess if he is doing better than he was upon admission.  Patient is encouraged to participate in groups and assisted to develop healthy coping skills.     Length of stay: 1    Refer to daily team meeting notes for individualized plan of care. Nursing will continue to assess.    * Scale is offered as scale of 1 to 10 with 10 being the worst                                                                         "
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
## 2021-10-03 PROCEDURE — 250N000013 HC RX MED GY IP 250 OP 250 PS 637: Performed by: NURSE PRACTITIONER

## 2021-10-03 PROCEDURE — 250N000013 HC RX MED GY IP 250 OP 250 PS 637: Performed by: EMERGENCY MEDICINE

## 2021-10-03 PROCEDURE — 124N000002 HC R&B MH UMMC

## 2021-10-03 PROCEDURE — H2032 ACTIVITY THERAPY, PER 15 MIN: HCPCS

## 2021-10-03 RX ADMIN — NICOTINE POLACRILEX 2 MG: 2 GUM, CHEWING BUCCAL at 19:36

## 2021-10-03 RX ADMIN — OLANZAPINE 10 MG: 10 TABLET, FILM COATED ORAL at 19:36

## 2021-10-03 RX ADMIN — NICOTINE 1 PATCH: 21 PATCH, EXTENDED RELEASE TRANSDERMAL at 09:27

## 2021-10-03 RX ADMIN — ESCITALOPRAM OXALATE 20 MG: 10 TABLET ORAL at 08:47

## 2021-10-03 RX ADMIN — NICOTINE POLACRILEX 2 MG: 2 GUM, CHEWING BUCCAL at 17:27

## 2021-10-03 ASSESSMENT — ACTIVITIES OF DAILY LIVING (ADL)
HYGIENE/GROOMING: INDEPENDENT
ORAL_HYGIENE: INDEPENDENT
DRESS: INDEPENDENT

## 2021-10-03 ASSESSMENT — MIFFLIN-ST. JEOR: SCORE: 1861.2

## 2021-10-03 NOTE — PLAN OF CARE
"Patient is hopeful stating \"I'm starting to feel a little better but still super tired\" and stated GOAL is \"to get more sleep/rest.\"     Patient rates depression 0  Patient rates anxiety at 0  Patient describes mood as \" just really tired \" and affect is flat, restricted.  Patient is tense and irritable when approached.  Did shower this shift.       Patient is guarded appearing Restricted and Blunted/Flat. Patient is med-compliant.  Patient is eating 100% and reports \"sleeps through the night and taking naps\". Patient is not attending groups.     Patient denies current or recent suicidal ideation    SIB denies    HI: denies current or recent homicidal ideation or behaviors.  Patient acknowledges auditory hallucinations.  Patient denies visual hallucinations.     Blood pressure 120/80, pulse 72, temperature 98.3  F (36.8  C), temperature source Oral, resp. rate 16, height 1.702 m (5' 7\"), weight 94.3 kg (207 lb 12.8 oz), SpO2 94 %.  Patient denies  pain.     Patient evaluation continues. Assessed mood,anxiety,thoughts and behavior. Patient is progressing towards his goal of getting sleep/rest but remains very guarded/withdrawn and extremely fatigued at this time.  Gives very brief answers and difficult to engage in assessment.  Patient is encouraged to participate in groups and assisted to develop healthy coping skills.      Length of stay: 2     Refer to daily team meeting notes for individualized plan of care. Nursing will continue to assess.     * Scale is offered as scale of 1 to 10 with 10 being the worst                                      "

## 2021-10-03 NOTE — PLAN OF CARE
Problem: Psychotic Symptoms  Goal: Psychotic Symptoms  Description: Signs and symptoms of listed problems will be absent or manageable.  10/2/2021 2225 by Anastasiia Perez RN  Outcome: No Change  Flowsheets (Taken 10/2/2021 2225)  Psychotic Symptoms Assessed: all  Psychotic Symptoms Present:   affect   thought process   sleep   insight  Patient asleep in room except to come out for food.  He denied all psychiatric and physical problems.  Patient quite paranoid and evasive during brief check in.  He did not respond to several questions until I repeated them more insistently.  Affect was angry and preoccupied.  He terminated check-in by rolling over and pulling covers over his head.

## 2021-10-03 NOTE — PLAN OF CARE
Problem: Psychotic Symptoms  Goal: Psychotic Symptoms  Description: Signs and symptoms of listed problems will be absent or manageable.  Outcome: No Change   Patient seclusive to his room except for dinner.  He was terse when approached for HS meds and needed questions repeated before he answered.  Patient denied physical problems and medication side effects. Auditory hallucinations of his wife's voice continue unchanged.  Patient endorsed SI, would not disclose if he had a plan, and eventually stated he could contract for safety while hospitalized.

## 2021-10-04 PROCEDURE — 250N000013 HC RX MED GY IP 250 OP 250 PS 637: Performed by: NURSE PRACTITIONER

## 2021-10-04 PROCEDURE — 99232 SBSQ HOSP IP/OBS MODERATE 35: CPT | Performed by: NURSE PRACTITIONER

## 2021-10-04 PROCEDURE — 124N000002 HC R&B MH UMMC

## 2021-10-04 PROCEDURE — 250N000013 HC RX MED GY IP 250 OP 250 PS 637: Performed by: EMERGENCY MEDICINE

## 2021-10-04 RX ORDER — POLYETHYLENE GLYCOL 3350 17 G
2 POWDER IN PACKET (EA) ORAL
Status: DISCONTINUED | OUTPATIENT
Start: 2021-10-04 | End: 2021-10-11 | Stop reason: HOSPADM

## 2021-10-04 RX ADMIN — ESCITALOPRAM OXALATE 20 MG: 10 TABLET ORAL at 08:31

## 2021-10-04 RX ADMIN — NICOTINE POLACRILEX 2 MG: 2 LOZENGE ORAL at 18:54

## 2021-10-04 RX ADMIN — HYDROXYZINE HYDROCHLORIDE 25 MG: 25 TABLET, FILM COATED ORAL at 18:54

## 2021-10-04 RX ADMIN — NICOTINE 1 PATCH: 21 PATCH, EXTENDED RELEASE TRANSDERMAL at 08:31

## 2021-10-04 RX ADMIN — NICOTINE POLACRILEX 2 MG: 2 LOZENGE ORAL at 17:39

## 2021-10-04 RX ADMIN — OLANZAPINE 10 MG: 10 TABLET, FILM COATED ORAL at 18:53

## 2021-10-04 RX ADMIN — NICOTINE POLACRILEX 2 MG: 2 LOZENGE ORAL at 08:31

## 2021-10-04 ASSESSMENT — ACTIVITIES OF DAILY LIVING (ADL)
HYGIENE/GROOMING: INDEPENDENT
ORAL_HYGIENE: INDEPENDENT
DRESS: INDEPENDENT
LAUNDRY: WITH SUPERVISION

## 2021-10-04 NOTE — PROGRESS NOTES
"[facility],  Psychiatric Progress Note      Impression:     Terrance Fletcher is a 31-year-old male admitted to [facility] on 10/1/2021.  He was admitted as a voluntary patient through the ER due to suicidal ideation and psychosis in the context of methamphetamine use.  He is homeless.  He has been in CD treatment at [facility] and is uncertain whether he can return.  After being advised to make plans to discharge from the ER, he reported suicidal ideation with a plan to jump off a bridge.  UTOX was positive for cannabinoids and amphetamines.  He reports smoking meth and cannabis about 2 times per week.  He provided conflicting information regarding medication adherence.  He said he was \"maybe\" taking Lexapro and that he was not prescribed Zyprexa recently but would like to take it.  He was lying in bed with his eyes closed, guarded and participated minimally in the assessment. Since admission, Lexapro was continued.  Zyprexa was initiated.  PRNs of Zyprexa, Hydroxyzine and Trazodone were initiated.  He reports auditory hallucinations are reduced.  States his mood remains depressed but less anxious and less irritable.  He reports intermittent suicidal ideation with a variety of plans.  He has been spending much of his time in his room.           Diagnoses:     Substance-induced psychosis (methamphetamine, cannabis)  Likely component of malingering  History of major depressive disorder  Rule out antisocial personality disorder  Methamphetamine use disorder  Cannabis use disorder  Nicotine use disorder  Psoriasis         Plan:     Medications:  Continue Lexapro 20 mg daily.  Continue Zyprexa 10 mg at HS.  PRNs of Zyprexa, Hydroxyzine and Trazodone are available.  Change nicotine from gum to lozenge.     He is uncertain whether he can return to [facility].  States he would like to discharge to [facility] or another CD treatment program.  He has a " "PCP.        Attestation:  Patient has been seen and evaluated by me, ELVIRA Stephen CNP  The patient was counseled on nature of illness and treatment plan/options  Care was coordinated with treatment team         Clinical Global Impressions  First:  Considering your total clinical experience with this particular patient population, how severe are the patient's symptoms at this time?: 6 (10/01/21 1436)  Compared to the patient's condition at the START of treatment, this patient's condition is: 4 (10/01/21 1436)  Most recent:  Considering your total clinical experience with this particular patient population, how severe are the patient's symptoms at this time?: 6 (10/01/21 1436)  Compared to the patient's condition at the START of treatment, this patient's condition is: 4 (10/01/21 1436)             Interim History:     The patient's care was discussed with the treatment team and chart notes were reviewed.  Pt was documented as sleeping 7.25, 7.5 and 7.25 hours during the weekend overnight shifts.  Staff report he has been tense and irritable during interactions.  He spent much of Friday and Saturday in his room but did shower and attend 1 group on Sunday.  Pt continued to provide vague and sometimes conflicting information during today's conversation.  He requested a nicotine lozenge rather than gum.  Pt denies feeling anxious or irritable.  He reports no improvement in depression since admission.  He said suicidal ideation \"comes and goes.\"  When asked about intent or plan, he replied, \"I did.\"  When asked for specifics, he responded, \"I don't know, run into traffic or jump off a bridge.\"  He contracts for safety on the unit.  States he has homicidal ideation towards \"people chasing me\" but could not identify who there are.  Auditory hallucinations of his ex-wife's voice saying he and his family will die are reduced and he has not heard any today thus far.  He has been eating well.  Energy remains low.  " " He denies side effects from meds.  Pt has not yet contacted [facility] Boykin and was encouraged to do so today.         Medications:     Current Facility-Administered Medications   Medication     acetaminophen (TYLENOL) tablet 650 mg     alum & mag hydroxide-simethicone (MAALOX) suspension 30 mL     escitalopram (LEXAPRO) tablet 20 mg     hydrOXYzine (ATARAX) tablet 25 mg     nicotine (COMMIT) lozenge 2 mg     nicotine (NICODERM CQ) 21 MG/24HR 24 hr patch 1 patch     nicotine Patch in Place     OLANZapine (zyPREXA) tablet 10 mg    Or     OLANZapine (zyPREXA) injection 10 mg     OLANZapine (zyPREXA) tablet 10 mg     senna-docusate (SENOKOT-S/PERICOLACE) 8.6-50 MG per tablet 1 tablet     traZODone (DESYREL) tablet 50 mg     triamcinolone (KENALOG) 0.1 % lotion             Allergies:   No Known Allergies         Psychiatric Examination:     /80   Pulse 72   Temp 98.3  F (36.8  C) (Oral)   Resp 16   Ht 1.702 m (5' 7\")   Wt 94.3 kg (207 lb 12.8 oz)   SpO2 94%   BMI 32.55 kg/m      Appearance:  awake, alert, disheveled, scabs and excoriated areas on face  Attitude:  guarded, evasive  Eye Contact:  minimal  Mood:  depressed  Affect:  intensity is blunted  Speech:  clear, coherent, minimal spontaneous speech  Psychomotor Behavior:  no evidence of tardive dyskinesia, dystonia, or tics  Thought Process:  linear and goal oriented  Associations:  no loose associations  Thought Content:  reports intermittent suicidal ideation with a variety of plans and contracts for safety on the unit, denies visual hallucinations, reports auditory hallucinations are reduced, reports paranoia regarding being followed  Insight:  limited  Judgment:  fair  Oriented to:  month/year, person, aware he is in a psych unit in [place]  Attention Span and Concentration:  limited  Recent and Remote Memory:  fair  Language:  intact, fluent English  Fund of Knowledge:  appropriate  Muscle Strength and Tone:  normal  Gait and Station:  lying in " bed, gait not observed          Labs:     No results found for this or any previous visit (from the past 24 hour(s)).

## 2021-10-04 NOTE — PLAN OF CARE
"Patient is hopeful stating \"I feel a little better, still tired\" and stated GOAL is \"to get more sleep/rest.\"     Patient rates depression 0  Patient rates anxiety at 0  Patient describes mood as \" just tired \" and affect is flat, restricted.  Patient is tense and irritable when approached but less so than previous days.  Did shower this shift.       Patient is guarded appearing restricted and Blunted/Flat. Patient is med-compliant.  Patient is eating 100% and reports \"sleeps through the night and taking naps\". Patient is attending groups occasionally but not consistently.  Encouraged group participation and attempted to engage in more in depth coversation regarding support and social dynamics outside of the hospital.  Patient reports \"I'm good\" and pulls covers over his head.  Terminated interview per patient request.      Patient denies current or recent suicidal ideation and SIB.      HI: denies current or recent homicidal ideation or behaviors.  Patient acknowledges auditory hallucination but gives no specific detail.  Denies visual hallucinations.     Blood pressure 109/72, pulse 69, temperature 97.4  F (36.3  C), temperature source Temporal, resp. rate 16, height 1.702 m (5' 7\"), weight 94.3 kg (207 lb 12.8 oz), SpO2 96 %.  Patient denies  pain.     Patient evaluation continues. Assessed mood,anxiety,thoughts and behavior.  Gives brief answers and difficult to engage in assessment, however appears less agitated/irritable than previous days.  Has come out of his room independently for medication, nicotine replacement, meals and occasional groups.  Patient is encouraged to participate in groups and assisted to develop healthy coping skills.      Length of stay: 3     Refer to daily team meeting notes for individualized plan of care. Nursing will continue to assess.     * Scale is offered as scale of 1 to 10 with 10 being the worst  "

## 2021-10-04 NOTE — PLAN OF CARE
Problem: General Rehab Plan of Care  Goal: Therapeutic Recreation/Music Therapy Goal  Description: The patient and/or their representative will achieve their patient-specific goals related to the plan of care.  The patient-specific goals include:  Outcome: No Change     Terrance attended art therapy group for 1 hour (2 patients total). He participated in the art activity to explore several different art materials to practice mindfulness. He appeared calm and engaged in the activity. He interacted appropriately with peer and writer. He shared his art with the group and engaged in group discussion about the process. He reported having a difficult time with making art in such a short amount of time and wishing he had many hours to work on art.

## 2021-10-04 NOTE — PLAN OF CARE
"  Problem: Psychotic Symptoms  Goal: Psychotic Symptoms  Description: Signs and symptoms of listed problems will be absent or manageable.  Outcome: Improving   Patient denied auditory hallucinations and problems thinking,  concentrating, and remembering.  He was visibly uncomfortable when asked questions during assessment attempt.  Patient appeared preoccupied;  gaze was fixed and expression paranoid.  He endorsed suicidal ideation with plan which he would not reveal - \"it's too embarrassing\" -  but did contract for safety.  He rated depression and anxiety both 7 out of 10.  Patient denied physical problems and medication side effects.      "

## 2021-10-04 NOTE — PLAN OF CARE
Assessment/Intervention/Current Symptoms and Care Coordination:   CTC met with pt regarding discharge planning. Pt reports thinks will be able to return to [facility]. There was some inconsistency in the notes regarding time line of pt's stay at [place]; he confirmed that he was homeless prior to staying there and came to the hospital from the home.   Pt was given the number, [phone], and advised that he needs to call them by this afternoon.    Discharge Plan or Goal:  Likely back to [facility] treatment    Barriers to Discharge:  ongoing stabilization    Referral Status:   Given number to call    Legal Status:   voluntary

## 2021-10-04 NOTE — PLAN OF CARE
BEHAVIORAL TEAM DISCUSSION    Participants: Provider: Kerri Rasmussen NP    CTC: Ariana Mon MS,LP    Nurse: Manuel Gee, RN  Psych Associate: Cuba Nuno  PharmD: VIRGINIA Sawant    Progress: Pt was admitted over the weekend, voluntary, reporting methamphetamine use and suicidal ideation. Has been mostly in his room sleeping.   Anticipated length of stay: 3-4 days  Continued Stay Criteria/Rationale: pt requires ongoing stabilization and safety  Medical/Physical: psoriasis  Precautions:   Behavioral Orders   Procedures     Code 1 - Restrict to Unit     Discontinue 1:1 attendant for suicide risk     Order Specific Question:   I have performed an in person assessment of the patient     Answer:   Based on this assessment the patient no longer requires a one on one attendant at this point in time.     Order Specific Question:   Rationale     Answer:   Medical Record Reviewed     Order Specific Question:   Rationale     Answer:   Patient States able to remain safe in hospital     Order Specific Question:   Rationale     Answer:   Modifications to care environment made to mitigate safety risk     Order Specific Question:   Rationale     Answer:   Routine observations are sufficient to monitor safety.     Routine Programming     As clinically indicated     Status 15     Every 15 minutes.     Suicide precautions     Patients on Suicide Precautions should have a Combination Diet ordered that includes a Diet selection(s) AND a Behavioral Tray selection for Safe Tray - with utensils, or Safe Tray - NO utensils       Withdrawal precautions     Plan: The patient will continue to meet w/ the treatment team for assessment and treatment planning, CTC will continue to work w/ for discharge planning.    Rationale for change in precautions or plan: pt to remain inpatient for safety and stabilization

## 2021-10-04 NOTE — PLAN OF CARE
"  Problem: Psychotic Symptoms  Goal: Psychotic Symptoms  Description: Signs and symptoms of listed problems will be absent or manageable.  Outcome: No Change  Patient was resting in bed when approached early this shift. Refused to talk and told writer he's sleeping. Refused vital signs check. Denied having anxiety and depression. Refused to answer other assessment questions. Instead he said, \"I'm ok\" and walked away. Med compliant. Attended group.   "

## 2021-10-05 PROCEDURE — 124N000002 HC R&B MH UMMC

## 2021-10-05 PROCEDURE — 250N000013 HC RX MED GY IP 250 OP 250 PS 637: Performed by: NURSE PRACTITIONER

## 2021-10-05 PROCEDURE — 99232 SBSQ HOSP IP/OBS MODERATE 35: CPT | Performed by: NURSE PRACTITIONER

## 2021-10-05 PROCEDURE — 250N000013 HC RX MED GY IP 250 OP 250 PS 637: Performed by: EMERGENCY MEDICINE

## 2021-10-05 RX ADMIN — NICOTINE POLACRILEX 2 MG: 2 LOZENGE ORAL at 18:40

## 2021-10-05 RX ADMIN — OLANZAPINE 10 MG: 10 TABLET, FILM COATED ORAL at 18:41

## 2021-10-05 RX ADMIN — TRAZODONE HYDROCHLORIDE 50 MG: 50 TABLET ORAL at 20:38

## 2021-10-05 RX ADMIN — HYDROXYZINE HYDROCHLORIDE 25 MG: 25 TABLET, FILM COATED ORAL at 14:37

## 2021-10-05 RX ADMIN — ESCITALOPRAM OXALATE 20 MG: 10 TABLET ORAL at 08:55

## 2021-10-05 RX ADMIN — TRIAMCINOLONE ACETONIDE: 1 LOTION TOPICAL at 18:42

## 2021-10-05 RX ADMIN — NICOTINE POLACRILEX 2 MG: 2 LOZENGE ORAL at 14:36

## 2021-10-05 RX ADMIN — NICOTINE POLACRILEX 2 MG: 2 LOZENGE ORAL at 08:55

## 2021-10-05 RX ADMIN — NICOTINE 1 PATCH: 21 PATCH, EXTENDED RELEASE TRANSDERMAL at 08:55

## 2021-10-05 ASSESSMENT — ACTIVITIES OF DAILY LIVING (ADL)
DRESS: INDEPENDENT
LAUNDRY: WITH SUPERVISION
HYGIENE/GROOMING: INDEPENDENT
ORAL_HYGIENE: INDEPENDENT

## 2021-10-05 NOTE — PLAN OF CARE
Assessment/Intervention/Current Symptoms and Care Coordination:   PT reports that he called [facility] Boy[facility] around lunch time today and left a message.   Discharge Plan or Goal:  Likely back to [facility]     Barriers to Discharge:  ongoing stabilization     Referral Status:   Given number to call     Legal Status:   voluntary

## 2021-10-05 NOTE — PLAN OF CARE
"Pt spent much of the day in his room sleeping. Pt presented as blunted and calm. Pt was heard briefly laughing at one of the staff's humorous comments. In the afternoon, pt said he is feeling depressed. The pt stated he called [facility] about placement and is waiting to here back. He said he would try to call again. He stated he was feeling anxious about not getting placed and being \"kicked out of the hospital.\" The pt asked for and received a prn of hydroxyzine 25 mg at 1437 for anxiety. Pt denied SI \"for the moment\" and contracted for safety.     Pt was medication compliant. No medication SE were reported or noted. Pt ate all meals. Pt denied pain.  "

## 2021-10-05 NOTE — PROGRESS NOTES
"[facility],  Psychiatric Progress Note      Impression:     Terrance Fletcher is a 31-year-old male admitted to [facility] on 10/1/2021.  He was admitted as a voluntary patient through the ER due to suicidal ideation and psychosis in the context of methamphetamine use.  He is homeless.  He was in CD treatment at [facility] but left a week prior to admission and then relapsed on meth and cannabis.  After being advised to make plans to discharge from the ER, he reported suicidal ideation with a plan to jump off a bridge.  UTOX was positive for cannabinoids and amphetamines.  He reports smoking meth and cannabis about 2 times per week.  He provided conflicting information regarding medication adherence.  He said he was \"maybe\" taking Lexapro and that he was not prescribed Zyprexa recently but would like to take it.  He was lying in bed with his eyes closed, guarded and participated minimally in the assessment. Since admission, Lexapro was continued.  Zyprexa was initiated.  PRNs of Zyprexa, Hydroxyzine and Trazodone were initiated.  He reports auditory hallucinations are reduced.  States his mood remains depressed but less anxious and less irritable.  Suicidal ideation is reduced.  He has been spending much of his time in his room.           Diagnoses:     Substance-induced psychosis (methamphetamine, cannabis)  Likely component of malingering  History of major depressive disorder  Rule out antisocial personality disorder  Methamphetamine use disorder  Cannabis use disorder  Nicotine use disorder  Psoriasis         Plan:     Medications:  Continue Lexapro 20 mg daily.  Continue Zyprexa 10 mg at HS.  PRNs of Zyprexa, Hydroxyzine and Trazodone are available.     He is uncertain whether he can return to [facility].  He says he left a message yesterday and provider observed him calling again today.  States he would like to discharge to [facility] or another  " treatment program.  He has a PCP.        Attestation:  Patient has been seen and evaluated by me, ELVIRA Stephen CNP  The patient was counseled on nature of illness and treatment plan/options  Care was coordinated with treatment team         Clinical Global Impressions  First:  Considering your total clinical experience with this particular patient population, how severe are the patient's symptoms at this time?: 6 (10/01/21 1436)  Compared to the patient's condition at the START of treatment, this patient's condition is: 4 (10/01/21 1436)  Most recent:  Considering your total clinical experience with this particular patient population, how severe are the patient's symptoms at this time?: 6 (10/01/21 1436)  Compared to the patient's condition at the START of treatment, this patient's condition is: 4 (10/01/21 1436)             Interim History:     The patient's care was discussed with the treatment team and chart notes were reviewed.  Pt was documented as sleeping 7.5 hours during the overnight shift.  He took PRN Hydroxyzine x 1 yesterday.  He has been refusing Kenalog but will continue to offer as he has psoriatic lesions on elbows.  He has been spending much of his time in bed and did not attend groups yesterday.  States that he did call and leave a message with [facility] regarding his desire to be readmitted but did not leave a call back number.  Provider gave him contact info for the unit, and immediately after the conversation he went to the phone to call [facility].  Pt was a bit more engaged and talkative during today's conversation.  States he left [facility] a week prior to being admitted to the hospital and only relapsed on meth and cannabis after he left their facility.  States that if he were homeless on the streets he would commit suicide.  States that overall suicidal ideation is reduced and he denies intent/plan.  He reports reduced auditory hallucinations of his ex-wife's voice.  He  "continues to endorse paranoia about being followed.  States he is spending a lot of time in bed due to drowsiness from Zyprexa but declines to change the dose or the medication.  He plans to take it temporarily until resolution of psychotic symptoms, which are likely meth-induced.         Medications:     Current Facility-Administered Medications   Medication     acetaminophen (TYLENOL) tablet 650 mg     alum & mag hydroxide-simethicone (MAALOX) suspension 30 mL     escitalopram (LEXAPRO) tablet 20 mg     hydrOXYzine (ATARAX) tablet 25 mg     nicotine (COMMIT) lozenge 2 mg     nicotine (NICODERM CQ) 21 MG/24HR 24 hr patch 1 patch     nicotine Patch in Place     OLANZapine (zyPREXA) tablet 10 mg    Or     OLANZapine (zyPREXA) injection 10 mg     OLANZapine (zyPREXA) tablet 10 mg     senna-docusate (SENOKOT-S/PERICOLACE) 8.6-50 MG per tablet 1 tablet     traZODone (DESYREL) tablet 50 mg     triamcinolone (KENALOG) 0.1 % lotion             Allergies:   No Known Allergies         Psychiatric Examination:     /76   Pulse 73   Temp 97  F (36.1  C) (Temporal)   Resp 16   Ht 1.702 m (5' 7\")   Wt 94.3 kg (207 lb 12.8 oz)   SpO2 96%   BMI 32.55 kg/m      Appearance:  awake, alert, disheveled, scabs and excoriated areas on face  Attitude:  guarded, evasive  Eye Contact:  minimal but improved  Mood:  depressed  Affect:  intensity is blunted, irritable at times  Speech:  clear, coherent, minimal spontaneous speech  Psychomotor Behavior:  no evidence of tardive dyskinesia, dystonia, or tics  Thought Process:  linear and goal oriented  Associations:  no loose associations  Thought Content:  reports passive suicidal ideation without intent/plan and contracts for safety on the unit, denies visual hallucinations, reports auditory hallucinations are reduced, reports paranoia regarding being followed  Insight:  limited  Judgment:  fair  Oriented to:  month/year, person, place  Attention Span and Concentration:  " limited  Recent and Remote Memory:  fair  Language:  intact, fluent English  Fund of Knowledge:  appropriate  Muscle Strength and Tone:  normal  Gait and Station:  normal         Labs:     No results found for this or any previous visit (from the past 24 hour(s)).

## 2021-10-06 LAB — SARS-COV-2 RNA RESP QL NAA+PROBE: NEGATIVE

## 2021-10-06 PROCEDURE — 250N000013 HC RX MED GY IP 250 OP 250 PS 637: Performed by: EMERGENCY MEDICINE

## 2021-10-06 PROCEDURE — 99231 SBSQ HOSP IP/OBS SF/LOW 25: CPT | Performed by: PHYSICIAN ASSISTANT

## 2021-10-06 PROCEDURE — 90375 RABIES IG IM/SC: CPT | Performed by: PHYSICIAN ASSISTANT

## 2021-10-06 PROCEDURE — U0003 INFECTIOUS AGENT DETECTION BY NUCLEIC ACID (DNA OR RNA); SEVERE ACUTE RESPIRATORY SYNDROME CORONAVIRUS 2 (SARS-COV-2) (CORONAVIRUS DISEASE [COVID-19]), AMPLIFIED PROBE TECHNIQUE, MAKING USE OF HIGH THROUGHPUT TECHNOLOGIES AS DESCRIBED BY CMS-2020-01-R: HCPCS | Performed by: NURSE PRACTITIONER

## 2021-10-06 PROCEDURE — 90853 GROUP PSYCHOTHERAPY: CPT

## 2021-10-06 PROCEDURE — 90472 IMMUNIZATION ADMIN EACH ADD: CPT | Performed by: PHYSICIAN ASSISTANT

## 2021-10-06 PROCEDURE — H0001 ALCOHOL AND/OR DRUG ASSESS: HCPCS

## 2021-10-06 PROCEDURE — 90471 IMMUNIZATION ADMIN: CPT | Performed by: PHYSICIAN ASSISTANT

## 2021-10-06 PROCEDURE — G0177 OPPS/PHP; TRAIN & EDUC SERV: HCPCS

## 2021-10-06 PROCEDURE — 250N000013 HC RX MED GY IP 250 OP 250 PS 637: Performed by: NURSE PRACTITIONER

## 2021-10-06 PROCEDURE — 250N000011 HC RX IP 250 OP 636: Performed by: PHYSICIAN ASSISTANT

## 2021-10-06 PROCEDURE — 124N000002 HC R&B MH UMMC

## 2021-10-06 PROCEDURE — 90675 RABIES VACCINE IM: CPT | Performed by: PHYSICIAN ASSISTANT

## 2021-10-06 PROCEDURE — 99232 SBSQ HOSP IP/OBS MODERATE 35: CPT | Performed by: NURSE PRACTITIONER

## 2021-10-06 PROCEDURE — 90715 TDAP VACCINE 7 YRS/> IM: CPT | Performed by: PHYSICIAN ASSISTANT

## 2021-10-06 RX ORDER — RISPERIDONE 2 MG/1
2 TABLET ORAL AT BEDTIME
Status: DISCONTINUED | OUTPATIENT
Start: 2021-10-06 | End: 2021-10-11 | Stop reason: HOSPADM

## 2021-10-06 RX ORDER — OLANZAPINE 10 MG/2ML
10 INJECTION, POWDER, FOR SOLUTION INTRAMUSCULAR 3 TIMES DAILY PRN
Status: DISCONTINUED | OUTPATIENT
Start: 2021-10-06 | End: 2021-10-11 | Stop reason: HOSPADM

## 2021-10-06 RX ORDER — OLANZAPINE 5 MG/1
5-10 TABLET ORAL 3 TIMES DAILY PRN
Status: DISCONTINUED | OUTPATIENT
Start: 2021-10-06 | End: 2021-10-11 | Stop reason: HOSPADM

## 2021-10-06 RX ADMIN — NICOTINE POLACRILEX 2 MG: 2 LOZENGE ORAL at 13:13

## 2021-10-06 RX ADMIN — HYDROXYZINE HYDROCHLORIDE 25 MG: 25 TABLET, FILM COATED ORAL at 21:01

## 2021-10-06 RX ADMIN — NICOTINE POLACRILEX 2 MG: 2 LOZENGE ORAL at 17:34

## 2021-10-06 RX ADMIN — NICOTINE 1 PATCH: 21 PATCH, EXTENDED RELEASE TRANSDERMAL at 08:37

## 2021-10-06 RX ADMIN — NICOTINE POLACRILEX 2 MG: 2 LOZENGE ORAL at 08:37

## 2021-10-06 RX ADMIN — RABIES VACCINE 1 ML: KIT at 15:46

## 2021-10-06 RX ADMIN — ACETAMINOPHEN 650 MG: 325 TABLET, FILM COATED ORAL at 17:33

## 2021-10-06 RX ADMIN — RABIES IMMUNE GLOBULIN (HUMAN) 1890 UNITS: 300 INJECTION, SOLUTION INFILTRATION; INTRAMUSCULAR at 16:51

## 2021-10-06 RX ADMIN — CLOSTRIDIUM TETANI TOXOID ANTIGEN (FORMALDEHYDE INACTIVATED), CORYNEBACTERIUM DIPHTHERIAE TOXOID ANTIGEN (FORMALDEHYDE INACTIVATED), BORDETELLA PERTUSSIS TOXOID ANTIGEN (GLUTARALDEHYDE INACTIVATED), BORDETELLA PERTUSSIS FILAMENTOUS HEMAGGLUTININ ANTIGEN (FORMALDEHYDE INACTIVATED), BORDETELLA PERTUSSIS PERTACTIN ANTIGEN, AND BORDETELLA PERTUSSIS FIMBRIAE 2/3 ANTIGEN 0.5 ML: 5; 2; 2.5; 5; 3; 5 INJECTION, SUSPENSION INTRAMUSCULAR at 15:02

## 2021-10-06 RX ADMIN — NICOTINE POLACRILEX 2 MG: 2 LOZENGE ORAL at 16:32

## 2021-10-06 RX ADMIN — ESCITALOPRAM OXALATE 20 MG: 10 TABLET ORAL at 08:37

## 2021-10-06 RX ADMIN — RISPERIDONE 2 MG: 2 TABLET ORAL at 21:01

## 2021-10-06 RX ADMIN — NICOTINE POLACRILEX 2 MG: 2 LOZENGE ORAL at 19:21

## 2021-10-06 ASSESSMENT — ACTIVITIES OF DAILY LIVING (ADL)
LAUNDRY: WITH SUPERVISION
LAUNDRY: WITH SUPERVISION
DRESS: INDEPENDENT
HYGIENE/GROOMING: INDEPENDENT
DRESS: SCRUBS (BEHAVIORAL HEALTH);INDEPENDENT
ORAL_HYGIENE: INDEPENDENT
ORAL_HYGIENE: INDEPENDENT
HYGIENE/GROOMING: INDEPENDENT

## 2021-10-06 NOTE — PROGRESS NOTES
"Brief Medicine Note    Contacted by psychiatry provider regarding patient reporting dog bite on 9/30 and found to have a right lateral thigh puncture wound.    Patient is able to demonstrate the site and says the dog is unknown to him and would not be available to observe or quarantine and does not known the dog's home or owner.    He denies symptoms and pain at the site is improved.  He never had pus or redness at the site. Denies fevers, chills, adenopathy or other signs of infection.  He states he had a tetnus shot at [facility] 3 days ago.     Today's vital signs, medications, and nursing notes were reviewed. Labs reviewed.     /75   Pulse 72   Temp 97.3  F (36.3  C) (Temporal)   Resp 16   Ht 1.702 m (5' 7\")   Wt 94.3 kg (207 lb 12.8 oz)   SpO2 96%   BMI 32.55 kg/m    General: A&O. NAD. Ambulatory without assistance  Chest: nonlabored breathing  Skin: right lateral thigh puncture site is CDI and with punctate scabs, no erythema or drainage, trace bruise, no venous distention  Extremities: no edema       A/P:  Bite bite, noninfected  Discussed rabies and tetnus risks with patient and strongly recommend below regiment. Patient is refusing all vaccinations/immunoglobin at this time and voiced understanding of risks that include death and states \"I do not think I have rabies\"  -appreciate ID katiuska  - TDap today if patient allows, currently refusing and states he had a vaccination 3 days ago that we do not have on record, as we do not have proof of vaccination   - rabies vaccine is recommended today (10/6), is 3 days (10/9), on day 7 (10/13) and day 14 (10/20)--assuming this start date, patient is currently refusing  - recommend immunoglobin injection (300units/kg at right lateral thigh wound site) on day 0 as well, patient refusing  - no indication for antibiotics as patient is now at day 7 post-bite and has no signs of infection, start antibiotics (such as Augmentin 875mg BID) if any signs if infection  - " recommend dog be observed if available    - 2 nurses witnessed patient's refusal of cares, including the patient's primary nurse today    Medicine will sign off at this time, please call with questions or concerns    Elham Wilcox PA-C  [facility]  Contact information available via [facility] Paging/Directory

## 2021-10-06 NOTE — PROGRESS NOTES
Behavioral Health  Note   Behavioral Health  Spirituality Group Note     [address]    Name: Terrance Fletcher    YOB: 1990   MRN: [id number]    Age: 31 year old     Patient attended -led group, which included discussion of spirituality, coping with illness and building resilience.   Patient attended group for 1.0 hrs.   patient demonstrated an appreciation of topic's application for their personal circumstances.     [facility]  Staff    Page [phone]

## 2021-10-06 NOTE — PLAN OF CARE
Assessment/Intervention/Current Symptoms and Care Coordination:   Pt has not yet heard from staff at [facility] regarding readmit. CD assessment referral made.    Discharge Plan or Goal:  Likely back to [facility] treatment     Barriers to Discharge:  ongoing stabilization     Referral Status:   Given number to call     Legal Status:   voluntary

## 2021-10-06 NOTE — PLAN OF CARE
Problem: Behavioral Health Plan of Care  Goal: Plan of Care Review  Outcome: Improving  Flowsheets (Taken 10/5/2021 1900)  Plan of Care Reviewed With: patient  Patient Agreement with Plan of Care: agrees   Pt was visible in the milieu before supper; His affect was full range, mood calm and depressed, he spent most of his evening in his room. He denied SI/SIB, A/VH's,  HI, constipation, and pain. Pt endorsed anxiety and depression because of not knowing where he would go after his discharge from the hospital. He took his medication including Trazodone for sleep, and he had no complaints about his meds. Pt reports eating and sleeping okay. He attended the evening group activity. Pt reported that a dog bite on his right hip area.The site is slightly red, dry and not tender.

## 2021-10-06 NOTE — PLAN OF CARE
"Patient is hopeful stating \"I feel a little better, still tired\" and stated GOAL is \"to get more sleep/rest and set up a place to go (upon discharge)\".     Patient rates depression 0  Patient rates anxiety at 4  Patient describes mood as \"still just tired \" and affect is flat, restricted.  Patient is tense and irritable at times when approached but improving.  When well rested he seems much more agreeable.     Patient is guarded appearing restricted and Blunted/Flat. Patient is med-compliant.  Patient is eating 100% and reports \"sleeps through the night and taking naps\". Patient is attending groups consistently.     Patient denies current or recent suicidal ideation and SIB.       HI: denies current or recent homicidal ideation or behaviors.  Patient acknowledges auditory hallucination but gives no specific detail.  Denies visual hallucinations.     Blood pressure 108/75, pulse 72, temperature 97.3  F (36.3  C), temperature source Temporal, resp. rate 16, height 1.702 m (5' 7\"), weight 94.3 kg (207 lb 12.8 oz), SpO2 96 %.       Patient evaluation continues. Assessed mood,anxiety,thoughts and behavior.  Gives brief answers and difficult to engage in assessment, however appears less agitated/irritable than previous days.  Has come out of his room independently for medication, nicotine replacement, meals and occasional groups.  Patient is encouraged to participate in groups and assisted to develop healthy coping skills.  Has been a bit brighter today, showing full range affect, smiling and laughing appropriately at times.  Remains mostly isolative and this AM was refusing to engage in check in and refusing covid test.  After napping he was much more agreeable, allowed covid test, answered question (remains brief) and agreed to tetanus and rabies vaccinations.       Length of stay: 4     Refer to daily team meeting notes for individualized plan of care. Nursing will continue to assess.     * Scale is offered as scale of " 1 to 10 with 10 being the worst

## 2021-10-06 NOTE — CONSULTS
10/6/2021    CD consult completed.   Pt reports he would like to [facility] if [facility] since that is where all of his belongings are. I will make a referral there. For second option, pt willing to return to [facility] as he had been there previously.   I will send ROIs to unit [facility] for pt to sign, and I will make referrals.     Recommendations:   1. Abstain from all non-prescribed mood-altering substances  2. Take all medications as prescribed  3. Enter and complete a residential or inpatient treatment program  4. Follow all recommendations upon discharge from treatment. Recommendations may include, but are not limited to: extended treatment, outpatient treatment and/or sober housing.  5. Obtain primary medical providers and follow all recommendations     Referrals:  [facility]  Tel: [phone]  Fax: [phone]  Main Office:  [address] [place] [address] [address]  [place], [place] [id number] [place] [address] - [phone]  Fax - [phone]     CLINT consult completed by: Daisy Sandhu [facility].  Phone Number: [phone]  E-mail Address: @[facility] Evaluation Department  [address] [place] [address]     *Due to regulation of Title 42 of the Code of Federal Regulations (CFR) Part 2: Confidentiality laws apply to this note and the information wherein.  Thus, this note cannot be copy and pasted into any other health care staff's note nor can it be included in general medical records sent to ANY outside agency without the patient's written consent.
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
## 2021-10-06 NOTE — PROGRESS NOTES
"[facility],  Psychiatric Progress Note      Impression:     Terrance Fletcher is a 31-year-old male admitted to [facility] on 10/1/2021.  He was admitted as a voluntary patient through the ER due to suicidal ideation and psychosis in the context of methamphetamine use.  He is homeless.  He was in CD treatment at [facility] but left a week prior to admission and then relapsed on meth and cannabis.  After being advised to make plans to discharge from the ER, he reported suicidal ideation with a plan to jump off a bridge.  UTOX was positive for cannabinoids and amphetamines.  He reports smoking meth and cannabis about 2 times per week.  He provided conflicting information regarding medication adherence.  He said he was \"maybe\" taking Lexapro and that he was not prescribed Zyprexa recently but would like to take it.  He was lying in bed with his eyes closed, guarded and participated minimally in the assessment. Since admission, Lexapro was continued.  Zyprexa was initiated but then replaced by Risperdal due to somnolence.  PRNs of Zyprexa, Hydroxyzine and Trazodone were initiated.  He reports auditory hallucinations are reduced.  States his mood remains depressed but less anxious and less irritable.  Suicidal ideation is reduced.  He has been spending much of his time in his room.           Diagnoses:     Substance-induced psychosis (methamphetamine, cannabis)  Likely component of malingering  History of major depressive disorder  Rule out antisocial personality disorder  Methamphetamine use disorder  Cannabis use disorder  Nicotine use disorder  Psoriasis         Plan:     Medications:  Continue Lexapro 20 mg daily.  Discontinue Zyprexa.  Begin Risperdal 2 mg at .  PRNs of Zyprexa, Hydroxyzine and Trazodone are available.     He is uncertain whether he can return to [facility].  He has left 2 messages this week.  States he would like to discharge to  " "treatment.  CD consult ordered.  He has a PCP.        Attestation:  Patient has been seen and evaluated by me, ELVIRA Stephen CNP  The patient was counseled on nature of illness and treatment plan/options  Care was coordinated with treatment team         Clinical Global Impressions  First:  Considering your total clinical experience with this particular patient population, how severe are the patient's symptoms at this time?: 6 (10/01/21 1436)  Compared to the patient's condition at the START of treatment, this patient's condition is: 4 (10/01/21 1436)  Most recent:  Considering your total clinical experience with this particular patient population, how severe are the patient's symptoms at this time?: 6 (10/01/21 1436)  Compared to the patient's condition at the START of treatment, this patient's condition is: 4 (10/01/21 1436)             Interim History:     The patient's care was discussed with the treatment team and chart notes were reviewed.  Pt was documented as sleeping 7.5 hours during the overnight shift.  He took PRN Hydroxyzine x 1 and PRN Trazodone x 1 yesterday.  He has been spending much of his time in his room.  He called [employer] and left another message yesterday including call back number.  Pt was more engaged during today's conversation.  States his mood is improving.  Appetite is good.  He denies suicidal ideation.  He continues to report paranoia that people will follow him in cars after he leaves the hospital.  When asked about their identities, he said, \"I know who they are and that's not up for discussion.\"  He reports auditory hallucinations said, \"Don't go back to [facility] or they'll kill you.\"  He reports auditory hallucinations last night and \"I took the Zyprexa and they went away.\"  Discussed concerns about daytime drowsiness.  Pt reports that historically he attended CD treatment while taking Zyprexa and it was difficult.  Discussed less sedating options.  He agreed to " "try Risperdal, which he has taken in the past.  Pt reports a dog bite on his right thigh which occurred 9/30.  Provider assessed and it was a small puncture wound and laceration with yellowed bruising and no signs or symptoms of infection.  Provider notified internal medicine who will determine if any prophylactic treatment is warranted.            Medications:     Current Facility-Administered Medications   Medication     acetaminophen (TYLENOL) tablet 650 mg     alum & mag hydroxide-simethicone (MAALOX) suspension 30 mL     escitalopram (LEXAPRO) tablet 20 mg     hydrOXYzine (ATARAX) tablet 25 mg     nicotine (COMMIT) lozenge 2 mg     nicotine (NICODERM CQ) 21 MG/24HR 24 hr patch 1 patch     nicotine Patch in Place     OLANZapine (zyPREXA) tablet 5-10 mg    Or     OLANZapine (zyPREXA) injection 10 mg     risperiDONE (risperDAL) tablet 2 mg     senna-docusate (SENOKOT-S/PERICOLACE) 8.6-50 MG per tablet 1 tablet     traZODone (DESYREL) tablet 50 mg     triamcinolone (KENALOG) 0.1 % lotion             Allergies:   No Known Allergies         Psychiatric Examination:     /75   Pulse 72   Temp 97.3  F (36.3  C) (Temporal)   Resp 16   Ht 1.702 m (5' 7\")   Wt 94.3 kg (207 lb 12.8 oz)   SpO2 96%   BMI 32.55 kg/m      Appearance:  awake, alert, some improvement in grooming/hygiene, scabs and excoriated areas on face  Attitude:  less guarded  Eye Contact:  fair, improving  Mood:  depressed but improving  Affect:  intensity is blunted  Speech:  clear, coherent  Psychomotor Behavior:  no evidence of tardive dyskinesia, dystonia, or tics  Thought Process:  linear and goal oriented  Associations:  no loose associations  Thought Content:  denies suicidal and homicidal ideation, denies visual hallucinations, reports auditory hallucinations are reduced, reports paranoia regarding being followed  Insight:  limited  Judgment:  fair  Oriented to:  time, date, person, place  Attention Span and Concentration:  limited, some " improvement  Recent and Remote Memory:  fair  Language:  intact, fluent English  Fund of Knowledge:  appropriate  Muscle Strength and Tone:  normal  Gait and Station:  normal         Labs:     No results found for this or any previous visit (from the past 24 hour(s)).

## 2021-10-06 NOTE — PLAN OF CARE
Patient slept approximately 7.5 hours during the overnight shift; appeared comfortable with even and unlabored breathing while asleep. Patient currently in bed, appears to be comfortably asleep. No issues or concerns reported or observed. Nursing will continue to monitor.

## 2021-10-07 PROCEDURE — 124N000002 HC R&B MH UMMC

## 2021-10-07 PROCEDURE — 250N000013 HC RX MED GY IP 250 OP 250 PS 637: Performed by: NURSE PRACTITIONER

## 2021-10-07 PROCEDURE — 99232 SBSQ HOSP IP/OBS MODERATE 35: CPT | Performed by: NURSE PRACTITIONER

## 2021-10-07 PROCEDURE — H2032 ACTIVITY THERAPY, PER 15 MIN: HCPCS

## 2021-10-07 PROCEDURE — 250N000013 HC RX MED GY IP 250 OP 250 PS 637: Performed by: EMERGENCY MEDICINE

## 2021-10-07 RX ADMIN — TRIAMCINOLONE ACETONIDE: 1 LOTION TOPICAL at 08:30

## 2021-10-07 RX ADMIN — RISPERIDONE 2 MG: 2 TABLET ORAL at 21:03

## 2021-10-07 RX ADMIN — HYDROXYZINE HYDROCHLORIDE 25 MG: 25 TABLET, FILM COATED ORAL at 12:32

## 2021-10-07 RX ADMIN — HYDROXYZINE HYDROCHLORIDE 25 MG: 25 TABLET, FILM COATED ORAL at 21:03

## 2021-10-07 RX ADMIN — TRAZODONE HYDROCHLORIDE 50 MG: 50 TABLET ORAL at 21:03

## 2021-10-07 RX ADMIN — NICOTINE POLACRILEX 2 MG: 2 LOZENGE ORAL at 08:32

## 2021-10-07 RX ADMIN — ESCITALOPRAM OXALATE 20 MG: 10 TABLET ORAL at 08:30

## 2021-10-07 RX ADMIN — NICOTINE POLACRILEX 2 MG: 2 LOZENGE ORAL at 18:33

## 2021-10-07 RX ADMIN — NICOTINE POLACRILEX 2 MG: 2 LOZENGE ORAL at 19:32

## 2021-10-07 RX ADMIN — NICOTINE POLACRILEX 2 MG: 2 LOZENGE ORAL at 14:31

## 2021-10-07 ASSESSMENT — MIFFLIN-ST. JEOR: SCORE: 1897.04

## 2021-10-07 ASSESSMENT — ACTIVITIES OF DAILY LIVING (ADL)
DRESS: INDEPENDENT;SCRUBS (BEHAVIORAL HEALTH)
ORAL_HYGIENE: INDEPENDENT
HYGIENE/GROOMING: INDEPENDENT
LAUNDRY: WITH SUPERVISION
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
## 2021-10-07 NOTE — PLAN OF CARE
Problem: Sleep Disturbance  Goal: Adequate Sleep/Rest  Outcome: Improving   Pt slept 7.5hrs thus far. Breathing pattern normal. The 15 mins safety checks cont. No sign of pain or any discomfort. Pt is on suicide precaution with no related event.

## 2021-10-07 NOTE — PROGRESS NOTES
"[facility],  Psychiatric Progress Note      Impression:     Terrance Fletcher is a 31-year-old male admitted to [facility] on 10/1/2021.  He was admitted as a voluntary patient through the ER due to suicidal ideation and psychosis in the context of methamphetamine use.  He is homeless.  He was in CD treatment at [facility] but left a week prior to admission and then relapsed on meth and cannabis.  After being advised to make plans to discharge from the ER, he reported suicidal ideation with a plan to jump off a bridge.  UTOX was positive for cannabinoids and amphetamines.  He reports smoking meth and cannabis about 2 times per week.  He provided conflicting information regarding medication adherence.  He said he was \"maybe\" taking Lexapro and that he was not prescribed Zyprexa recently but would like to take it.  He was lying in bed with his eyes closed, guarded and participated minimally in the assessment. Since admission, Lexapro was continued.  Zyprexa was initiated but then replaced by Risperdal due to somnolence.  PRNs of Zyprexa, Hydroxyzine and Trazodone were initiated.  Psychotic symptoms are significantly reduced.  His mood remains depressed but less anxious and less irritable.  He denies suicidal ideation.           Diagnoses:     Substance-induced psychosis (methamphetamine, cannabis)  Likely component of malingering  History of major depressive disorder  Rule out antisocial personality disorder  Methamphetamine use disorder  Cannabis use disorder  Nicotine use disorder  Psoriasis  Dog bite 9/30 with need for rabies vaccine injections 10/9, 10/13 and 10/20         Plan:     Medications:  Continue Lexapro 20 mg daily.  Continue Risperdal 2 mg at HS.  PRNs of Zyprexa, Hydroxyzine and Trazodone are available.    He will need rabies vaccine injections 10/9, 10/13 and 10/20.  Messaged internal medicine to determine where he can go to receive these given " plan to discharge sometime in the next several days.       He is uncertain whether he can return to [facility].  He has left 2 messages this week.  States he would like to discharge to CD treatment.  CD consult completed 10/6.  He was referred to [facility] and [facility] and has requested an additional referral to [facility].  He has a PCP.        Attestation:  Patient has been seen and evaluated by me, ELVIRA Stephen CNP  The patient was counseled on nature of illness and treatment plan/options  Care was coordinated with treatment team         Clinical Global Impressions  First:  Considering your total clinical experience with this particular patient population, how severe are the patient's symptoms at this time?: 6 (10/01/21 1436)  Compared to the patient's condition at the START of treatment, this patient's condition is: 4 (10/01/21 1436)  Most recent:  Considering your total clinical experience with this particular patient population, how severe are the patient's symptoms at this time?: 6 (10/01/21 1436)  Compared to the patient's condition at the START of treatment, this patient's condition is: 4 (10/01/21 1436)             Interim History:     The patient's care was discussed with the treatment team and chart notes were reviewed.  Pt was documented as sleeping 7.5 hours during the overnight shift.  He took PRN Tylenol x 1 and PRN Hydroxyzine x 1 yesterday.  After initially refusing, pt was cooperative with all injections recommended by internal medicine status post dog bite 9/30.  He attended some groups yesterday.  He completed a CD evaluation yesterday.  He denies auditory hallucinations.  Paranoia is significantly reduced.  He denies suicidal and homicidal ideation.  He rates depression 3/10 and anxiety 2/10.  He feels more alert since switching from Zyprexa to Risperdal.           Medications:     Current Facility-Administered Medications   Medication     acetaminophen (TYLENOL) tablet 650 mg  "    alum & mag hydroxide-simethicone (MAALOX) suspension 30 mL     escitalopram (LEXAPRO) tablet 20 mg     hydrOXYzine (ATARAX) tablet 25 mg     nicotine (COMMIT) lozenge 2 mg     nicotine (NICODERM CQ) 21 MG/24HR 24 hr patch 1 patch     nicotine Patch in Place     OLANZapine (zyPREXA) tablet 5-10 mg    Or     OLANZapine (zyPREXA) injection 10 mg     [START ON 10/13/2021] rabies vaccine, PCEC (chick embryo derived) (RABAVERT) injection 1 mL     [START ON 10/9/2021] rabies vaccine, PCEC (chick embryo derived) (RABAVERT) injection 1 mL     risperiDONE (risperDAL) tablet 2 mg     senna-docusate (SENOKOT-S/PERICOLACE) 8.6-50 MG per tablet 1 tablet     traZODone (DESYREL) tablet 50 mg     triamcinolone (KENALOG) 0.1 % lotion             Allergies:   No Known Allergies         Psychiatric Examination:     /79   Pulse 99   Temp 97.4  F (36.3  C) (Temporal)   Resp 16   Ht 1.702 m (5' 7\")   Wt 97.8 kg (215 lb 11.2 oz)   SpO2 97%   BMI 33.78 kg/m      Appearance:  awake, alert, adequate grooming, scabs and excoriated areas on face  Attitude:  cooperative  Eye Contact:  fair, improving  Mood:  improved, less depressed and less anxious  Affect:  intensity is blunted  Speech:  clear, coherent  Psychomotor Behavior:  no evidence of tardive dyskinesia, dystonia, or tics  Thought Process:  linear and goal oriented  Associations:  no loose associations  Thought Content:  denies suicidal and homicidal ideation, denies visual hallucinations, denies auditory hallucinations, paranoia is reduced  Insight:  fair  Judgment:  fair  Oriented to:  time, date, person, place  Attention Span and Concentration:  fair, improved  Recent and Remote Memory:  fair  Language:  intact, fluent English  Fund of Knowledge:  appropriate  Muscle Strength and Tone:  normal  Gait and Station:  normal         Labs:     Recent Results (from the past 24 hour(s))   Asymptomatic COVID-19 Virus (Coronavirus) by PCR Nasopharyngeal    Collection Time: " 10/06/21  1:56 PM    Specimen: Nasopharyngeal; Swab   Result Value Ref Range    SARS CoV2 PCR Negative Negative

## 2021-10-07 NOTE — PLAN OF CARE
"RN Note:    Patient presents with Blunted/Flat affect and  calm  mood. Patient was calm, cooperative, and pleasant during this shift. Patient denies  SI, SIB, HI, and AVH. Patient denies  pain. Patient was up for meals, otherwise resting in bed. Patient stated \"I am not a morning person and usually sleep in late.\" Patient  did not attend groups. Patient is med-compliant. No medication side effects observed or endorsed by patient. Patient explained he feels ready for CD treatment and that he's just waiting for a bed to open up. Patient had difficulty identifying appropriate coping strategies for avoiding excessive substance abuse - re-enforcement needed.     Incidents to note:    Patient was able to obtain his mother's phone number. He was informed that his younger brother was diagnosed w/ testicular cancer. Patient stated he is hopeful it is in an early stage and that he can be treated quickly. Patient continues to deny SI and SIB.    PRNs given this shift:    Hydroxyzine 25mg - 1232    /79   Pulse 99   Temp 97.4  F (36.3  C) (Temporal)   Resp 16   Ht 1.702 m (5' 7\")   Wt 97.8 kg (215 lb 11.2 oz)   SpO2 97%   BMI 33.78 kg/m      "

## 2021-10-07 NOTE — PLAN OF CARE
Assessment/Intervention/Current Symtoms and Care Coordination  -Chart review  -Pre round meeting with team  -Rounded with team, addressed patient needs/concerns  -Post round meeting with team  Current Symptoms include the following: Depression sx Improving-we want to do a door-to door placement;    CD Referrals were made yesterday by  WILLIAN Sandhu to:    [facility]  Tel: [phone]/Fax: [phone]  Main Office: [address] [place] [address]    Writer placed call to number above and was told to contact Goyo Greenberg in admissions ([phone].  Writer placed call and left message for Mr Greenberg asking for callback.     [facility] [place] [place]s program   Ph [phone]/Fax [phone]  Spoke with admissions and he is approved to return there.  Admission scheduled for noon on Tuesday 1012/21.  He was also placed on the waitlist and if an earlier placement becomes available, they will call the unit.  They are not able to provide transportation.  CTC to schedule through [facility].    Address:  [address] [place] [id number]    Updated AVS    Returned call and left message for [facility] coordinator Tamera Arriola ([phone]) and provided an update on dispo plan     Discharge Plan or Goal  5-7 days to [facility] treatment at [facility]     Barriers to Discharge   Sx stabilization, medication management, safe placement (door to door due to high risk of relapse)     Referral Status  No new referrals made today     Legal Status  Voluntary

## 2021-10-07 NOTE — PLAN OF CARE
"RN Note:    Patient presents with Reactive/Full range affect and  calm  mood. Patient was calm, cooperative, and pleasant during this shift. Patient stated he is feeling \"a little anxious\" r/t waiting for CD treatment. Patient denies  SI, SIB, HI, and AVH. Patient denies  pain. Patient was visible in lounge watching movies and interacting w/ peers. Patient did attend groups. Patient is med-compliant. No medication side effects observed or endorsed by patient.    Incidents to note:    Rabies immune globulin administered per provider orders. Provider clarified no infiltration of site necessary. Administered injection at both left and right ventrogluteal location via IM route. Patient tolerated well and is denying side effects r/t injection. Nursing will continue to monitor.    PRNs given this shift:    Hydroxyzine 25mg - 2100     /75   Pulse 72   Temp 98.1  F (36.7  C)   Resp 16   Ht 1.702 m (5' 7\")   Wt 94.3 kg (207 lb 12.8 oz)   SpO2 97%   BMI 32.55 kg/m      "

## 2021-10-08 PROCEDURE — 250N000013 HC RX MED GY IP 250 OP 250 PS 637: Performed by: NURSE PRACTITIONER

## 2021-10-08 PROCEDURE — G0177 OPPS/PHP; TRAIN & EDUC SERV: HCPCS

## 2021-10-08 PROCEDURE — 124N000002 HC R&B MH UMMC

## 2021-10-08 PROCEDURE — 99232 SBSQ HOSP IP/OBS MODERATE 35: CPT | Performed by: NURSE PRACTITIONER

## 2021-10-08 PROCEDURE — 90853 GROUP PSYCHOTHERAPY: CPT

## 2021-10-08 PROCEDURE — 250N000013 HC RX MED GY IP 250 OP 250 PS 637: Performed by: EMERGENCY MEDICINE

## 2021-10-08 RX ORDER — TRIAMCINOLONE ACETONIDE 1 MG/ML
LOTION TOPICAL 2 TIMES DAILY
Qty: 60 ML | Refills: 1 | Status: SHIPPED | OUTPATIENT
Start: 2021-10-08 | End: 2021-10-08

## 2021-10-08 RX ORDER — ESCITALOPRAM OXALATE 20 MG/1
20 TABLET ORAL DAILY
Qty: 30 TABLET | Refills: 1 | Status: SHIPPED | OUTPATIENT
Start: 2021-10-08 | End: 2024-06-03

## 2021-10-08 RX ORDER — RISPERIDONE 2 MG/1
2 TABLET ORAL AT BEDTIME
Qty: 30 TABLET | Refills: 1 | Status: SHIPPED | OUTPATIENT
Start: 2021-10-08 | End: 2022-04-10

## 2021-10-08 RX ORDER — TRIAMCINOLONE ACETONIDE 1 MG/G
CREAM TOPICAL 2 TIMES DAILY
Qty: 60 G | Refills: 1 | Status: SHIPPED | OUTPATIENT
Start: 2021-10-08 | End: 2022-04-10

## 2021-10-08 RX ORDER — HYDROXYZINE HYDROCHLORIDE 25 MG/1
25 TABLET, FILM COATED ORAL DAILY PRN
Qty: 60 TABLET | Refills: 1 | Status: SHIPPED | OUTPATIENT
Start: 2021-10-08 | End: 2022-04-10

## 2021-10-08 RX ORDER — TRAZODONE HYDROCHLORIDE 50 MG/1
50 TABLET, FILM COATED ORAL
Qty: 30 TABLET | Refills: 1 | Status: SHIPPED | OUTPATIENT
Start: 2021-10-08 | End: 2022-04-10

## 2021-10-08 RX ADMIN — RISPERIDONE 2 MG: 2 TABLET ORAL at 20:41

## 2021-10-08 RX ADMIN — TRAZODONE HYDROCHLORIDE 50 MG: 50 TABLET ORAL at 20:42

## 2021-10-08 RX ADMIN — NICOTINE POLACRILEX 2 MG: 2 LOZENGE ORAL at 08:38

## 2021-10-08 RX ADMIN — HYDROXYZINE HYDROCHLORIDE 25 MG: 25 TABLET, FILM COATED ORAL at 12:04

## 2021-10-08 RX ADMIN — NICOTINE 1 PATCH: 21 PATCH, EXTENDED RELEASE TRANSDERMAL at 08:38

## 2021-10-08 RX ADMIN — NICOTINE POLACRILEX 2 MG: 2 LOZENGE ORAL at 16:42

## 2021-10-08 RX ADMIN — NICOTINE POLACRILEX 2 MG: 2 LOZENGE ORAL at 21:11

## 2021-10-08 RX ADMIN — TRIAMCINOLONE ACETONIDE: 1 LOTION TOPICAL at 20:42

## 2021-10-08 RX ADMIN — NICOTINE POLACRILEX 2 MG: 2 LOZENGE ORAL at 18:38

## 2021-10-08 RX ADMIN — ESCITALOPRAM OXALATE 20 MG: 10 TABLET ORAL at 08:38

## 2021-10-08 ASSESSMENT — ACTIVITIES OF DAILY LIVING (ADL)
ORAL_HYGIENE: INDEPENDENT
HYGIENE/GROOMING: INDEPENDENT
DRESS: SCRUBS (BEHAVIORAL HEALTH);INDEPENDENT
LAUNDRY: WITH SUPERVISION
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
## 2021-10-08 NOTE — PLAN OF CARE
"RN Note:    Patient presents with Reactive/Full range affect and  calm  mood. Patient was calm, cooperative, and pleasant during this shift. Patient denies  SI, SIB, HI, and AVH. Patient denies  pain. Patient was observed interacting w/ peers in the lounge. He was observed making jokes w/ staff and peers. He was out watching movies and TV throughout the night. Patient did the attend group tonight. Patient is med-compliant. No medication side effects observed or endorsed by patient.    Incidents to note:    none    PRNs given this shift:    Trazodone 50mg - 2103  Hydroxyzine 25mg - 2103    /74   Pulse 81   Temp 98.3  F (36.8  C) (Temporal)   Resp 16   Ht 1.702 m (5' 7\")   Wt 97.8 kg (215 lb 11.2 oz)   SpO2 96%   BMI 33.78 kg/m      "

## 2021-10-08 NOTE — PROGRESS NOTES
"[facility],  Psychiatric Progress Note      Impression:     Terrance Fletcher is a 31-year-old male admitted to [facility] on 10/1/2021.  He was admitted as a voluntary patient through the ER due to suicidal ideation and psychosis in the context of methamphetamine use.  He is homeless.  He was in CD treatment at [facility] but left a week prior to admission and then relapsed on meth and cannabis.  After being advised to make plans to discharge from the ER, he reported suicidal ideation with a plan to jump off a bridge.  UTOX was positive for cannabinoids and amphetamines.  He reports smoking meth and cannabis about 2 times per week.  He provided conflicting information regarding medication adherence.  He said he was \"maybe\" taking Lexapro and that he was not prescribed Zyprexa recently but would like to take it.  He was lying in bed with his eyes closed, guarded and participated minimally in the assessment. Since admission, Lexapro was continued.  Zyprexa was initiated but then replaced by Risperdal due to somnolence.  PRNs of Zyprexa, Hydroxyzine and Trazodone were initiated.  He denies psychotic symptoms.  Mood is improved and he denies suicidal ideation.           Diagnoses:     Substance-induced psychosis (methamphetamine, cannabis)  Likely component of malingering  History of major depressive disorder  Rule out antisocial personality disorder  Methamphetamine use disorder  Cannabis use disorder  Nicotine use disorder  Psoriasis  Dog bite 9/30 with need for rabies vaccine injections 10/9, 10/13 and 10/20         Plan:     Medications:  Continue Lexapro 20 mg daily.  Continue Risperdal 2 mg at HS.  PRNs of Zyprexa, Hydroxyzine and Trazodone are available.  Discharge meds ordered 10/8.    He will need rabies vaccine injections 10/9, 10/13 and 10/20.  After discharge, these can be done through a PCP or ER.     CD consult completed 10/6.  Discharge to [facility] " on 10/12; he must arrive at noon.  He has a PCP.        Attestation:  Patient has been seen and evaluated by me, ELVIRA Stephen CNP  The patient was counseled on nature of illness and treatment plan/options  Care was coordinated with treatment team         Clinical Global Impressions  First:  Considering your total clinical experience with this particular patient population, how severe are the patient's symptoms at this time?: 6 (10/01/21 1436)  Compared to the patient's condition at the START of treatment, this patient's condition is: 4 (10/01/21 1436)  Most recent:  Considering your total clinical experience with this particular patient population, how severe are the patient's symptoms at this time?: 6 (10/01/21 1436)  Compared to the patient's condition at the START of treatment, this patient's condition is: 4 (10/01/21 1436)             Interim History:     The patient's care was discussed with the treatment team and chart notes were reviewed.  Pt was documented as sleeping 6.5 hours during the overnight shift.  He took PRN Hydroxyzine x 2 and PRN Trazodone x 1 yesterday.  Pt has been more active in the milieu and reports feeling more alert since switch from Zyprexa to Risperdal.  He denies hallucinations for the past couple days and reports minimal paranoia.  Mood is much improved.  He denies suicidal ideation.  Pt is pleased with the plan to discharge to [facility] on 10/12.  Pt inquired about obtaining belongings including motorcycle from [facility].  He said his mother may be able to assist.  [facility] notified.  Discussed follow up for additional rabies vaccines.  Ordered medications for discharge.          Medications:     Current Facility-Administered Medications   Medication     acetaminophen (TYLENOL) tablet 650 mg     alum & mag hydroxide-simethicone (MAALOX) suspension 30 mL     escitalopram (LEXAPRO) tablet 20 mg     hydrOXYzine (ATARAX) tablet 25 mg     nicotine (COMMIT) lozenge 2 mg      "nicotine (NICODERM CQ) 21 MG/24HR 24 hr patch 1 patch     nicotine Patch in Place     OLANZapine (zyPREXA) tablet 5-10 mg    Or     OLANZapine (zyPREXA) injection 10 mg     [START ON 10/13/2021] rabies vaccine, PCEC (chick embryo derived) (RABAVERT) injection 1 mL     [START ON 10/9/2021] rabies vaccine, PCEC (chick embryo derived) (RABAVERT) injection 1 mL     risperiDONE (risperDAL) tablet 2 mg     senna-docusate (SENOKOT-S/PERICOLACE) 8.6-50 MG per tablet 1 tablet     traZODone (DESYREL) tablet 50 mg     triamcinolone (KENALOG) 0.1 % lotion             Allergies:   No Known Allergies         Psychiatric Examination:     /69 (BP Location: Left arm)   Pulse 60   Temp 97.9  F (36.6  C) (Temporal)   Resp 16   Ht 1.702 m (5' 7\")   Wt 97.8 kg (215 lb 11.2 oz)   SpO2 98%   BMI 33.78 kg/m      Appearance:  awake, alert, adequate grooming  Attitude:  cooperative  Eye Contact:  good  Mood:  \"good\"  Affect:  normal range  Speech:  clear, coherent  Psychomotor Behavior:  no evidence of tardive dyskinesia, dystonia, or tics  Thought Process:  linear and goal oriented  Associations:  no loose associations  Thought Content:  denies suicidal and homicidal ideation, denies visual hallucinations, denies auditory hallucinations, paranoia is significantly reduced compared to admission  Insight:  fair  Judgment:  fair  Oriented to:  time, date, person, place  Attention Span and Concentration:  fair, improved compared to admission  Recent and Remote Memory:  fair  Language:  intact, fluent English  Fund of Knowledge:  appropriate  Muscle Strength and Tone:  normal  Gait and Station:  normal         Labs:     No results found for this or any previous visit (from the past 24 hour(s)).  "

## 2021-10-08 NOTE — PLAN OF CARE
Music Therapy Group note    Clinical Hours in session: 1.0    Number of patients in group: 3    Scope of service: psychodynamic     Patient progress: initial encounter    Intervention: Evening Expressions     Goal of group: calming     Patient response/reaction to treatment intervention(s): Terrance responded the most to the Native American flute music played in group tonight.  He noted syncing his breath to the rhythm of the music and having a powerful experience.  He was appropriately social with peers in group, engaged and calm/cooperative throughout session this evening.  Appears to respond well to music and able to use it to relax his nervous system well.     Valerie Hickman, MT-BC  Board-Certified Music Therapist

## 2021-10-08 NOTE — PLAN OF CARE
"Assessment/Intervention/Current Symptoms and Care Coordination:   MARGUERITE from [facility] with [facility] intake.   [facility] states that they do not feel that Terrance is a good fit for the program and cannot return.   Pt was doing ok until \"he decided not to do well\" general disregard for rules, not really listening, wanted to do what he wanted and it was problematic.     MARGUERITE from Argelia at [facility], [phone]. [facility] returned call. Would like him to arrive at noon. 30 day medication in hand. Covid test needed. Asked if OK to have test done Sunday as pt is already scheduled for a test that day; Argelia said ok.    [facility] called pt's primary care clinic, [facility], they do not do rabies vaccinations.      [facility] called [facility] again, left message regarding the need for rabies shots on the 13th and 20th.    Discharge Plan or Goal:  CD treatment    Barriers to Discharge   Sx stabilization, medication management, safe placement (door to door due to high risk of relapse)     Referral Status  No new referrals made today     Legal Status  Voluntary  "

## 2021-10-08 NOTE — PLAN OF CARE
Pt appeared to sleep approximately 6.5 Hours during then night, no problems reported, no prn's given, on status 15 minute checks.

## 2021-10-08 NOTE — PLAN OF CARE
Initial OT Group :  Attended 30 minutes of a structured experiential relaxation exercise. Attentive /focused on relaxation exercise. Noted benefit from progressive relaxation exercises. Pleasant and social.  Will continue to encourage and support consistent group participation for increased self awareness and exploration of healthy coping skills.

## 2021-10-09 PROCEDURE — 124N000002 HC R&B MH UMMC

## 2021-10-09 PROCEDURE — 90471 IMMUNIZATION ADMIN: CPT | Performed by: PHYSICIAN ASSISTANT

## 2021-10-09 PROCEDURE — 250N000013 HC RX MED GY IP 250 OP 250 PS 637: Performed by: NURSE PRACTITIONER

## 2021-10-09 PROCEDURE — 250N000013 HC RX MED GY IP 250 OP 250 PS 637: Performed by: EMERGENCY MEDICINE

## 2021-10-09 PROCEDURE — 90675 RABIES VACCINE IM: CPT | Performed by: PHYSICIAN ASSISTANT

## 2021-10-09 PROCEDURE — 250N000011 HC RX IP 250 OP 636: Performed by: PHYSICIAN ASSISTANT

## 2021-10-09 RX ADMIN — NICOTINE POLACRILEX 2 MG: 2 LOZENGE ORAL at 15:14

## 2021-10-09 RX ADMIN — NICOTINE POLACRILEX 2 MG: 2 LOZENGE ORAL at 08:39

## 2021-10-09 RX ADMIN — NICOTINE POLACRILEX 2 MG: 2 LOZENGE ORAL at 17:07

## 2021-10-09 RX ADMIN — ESCITALOPRAM OXALATE 20 MG: 10 TABLET ORAL at 08:39

## 2021-10-09 RX ADMIN — TRAZODONE HYDROCHLORIDE 50 MG: 50 TABLET ORAL at 20:34

## 2021-10-09 RX ADMIN — TRIAMCINOLONE ACETONIDE: 1 LOTION TOPICAL at 08:39

## 2021-10-09 RX ADMIN — Medication 1 ML: at 14:54

## 2021-10-09 RX ADMIN — TRIAMCINOLONE ACETONIDE: 1 LOTION TOPICAL at 20:34

## 2021-10-09 RX ADMIN — NICOTINE POLACRILEX 2 MG: 2 LOZENGE ORAL at 18:26

## 2021-10-09 RX ADMIN — RISPERIDONE 2 MG: 2 TABLET ORAL at 20:35

## 2021-10-09 RX ADMIN — HYDROXYZINE HYDROCHLORIDE 25 MG: 25 TABLET, FILM COATED ORAL at 20:35

## 2021-10-09 RX ADMIN — NICOTINE POLACRILEX 2 MG: 2 LOZENGE ORAL at 11:25

## 2021-10-09 ASSESSMENT — ACTIVITIES OF DAILY LIVING (ADL)
LAUNDRY: WITH SUPERVISION
HYGIENE/GROOMING: INDEPENDENT
DRESS: INDEPENDENT
ORAL_HYGIENE: INDEPENDENT
DRESS: INDEPENDENT
ORAL_HYGIENE: INDEPENDENT
HYGIENE/GROOMING: INDEPENDENT

## 2021-10-09 NOTE — PLAN OF CARE
Pt appeared to sleep approximately 6.75 Hours during the night, no problems reported, no prn's given, on status 15 minute safety checks.

## 2021-10-09 NOTE — PROGRESS NOTES
" 10/08/21 1900   Groups   Details    (Psychotherapy)   Number of patients attending the group:  3  Group Length:  1 Hours     Group Therapy Type: Psychotherapy Process     Summary of Group / Topics Discussed:        The  Psychotherapy group goal is to promote insight to positive choice and change. Group processing is within a supportive and safe environment. Patients will process emotions using verbal group and expressive psychotherapy interventions including visual art/writing interventions.     Group interventions support patients by: fostering self awareness, communication/social skills and supports and mental health management     Modalities to reach these goals include: Narrative psychology and Expressive Arts Therapies,Process group and discussion about feeling \"seen\"        Subjective -patient report of mood today- \"catrachito\" , grateful to Higher Power     Objective/ Intervention- Goal of group and Therapeutic modality utilized- Process and drawing     Group Response- engaged.     Patient Response-Pt was engaged for full group time. He did very well with the number of distractions. He still feels ambivalent about sobriety but is more open to the concept of contentment rather than excitement and chaos. He spoke about loving drawing and made a very interesting drawing of a kid with an ice cream cone. He said it was not him \" I don't like to personalize things. \"He was open to the concept of \"reward\" , a word he uses frequently when it comes to substance use. He says he \" rewards himself. He was open to the fact of rewarding himself with a walk in nature, a set of 100 fine tip markers  For $20 he liked, writer had these pens from [employer]. He said for reward he could try an ice cream cone .for excitement, he said he could try different flavors each time. About the marker set he said \" this is worth a lot more than a \"$20 bag of meth.\"    About being authentic and seen, he says he always wears a \"mask\" and doesn't let " others know his true self. He identified that as the higher power that lives within him.    He's ambivalent but considering success at sobriety.      Cole Galindo, CAROL ANNFT, ATR-BC

## 2021-10-09 NOTE — PLAN OF CARE
Pt presents as calm, cooperative and pleasant.Pt denies SI/SIB/HI. Pt denies AH/VH. Pt seen visible in the milieu for majority of  Shift. Pt seen to appropriately due to anatimaery

## 2021-10-09 NOTE — PLAN OF CARE
Problem: Adult Inpatient Plan of Care  Goal: Plan of Care Review  Outcome: Improving  Flowsheets (Taken 10/8/2021 3066)  Plan of Care Reviewed With: patient   Affect full range, mood calm, pt was visible in the lounge most of the shift.  Pt socialized with peers, and he attended the evening group.  Pt denied SI/SIB, A/VH's, anxiety, depression, stated sleeping okay and eating well. Pt c/o no pain, no meds side effects, and constipation. He took his meds and Trazodone for sleep.

## 2021-10-10 PROCEDURE — 250N000013 HC RX MED GY IP 250 OP 250 PS 637: Performed by: NURSE PRACTITIONER

## 2021-10-10 PROCEDURE — 124N000002 HC R&B MH UMMC

## 2021-10-10 PROCEDURE — 250N000013 HC RX MED GY IP 250 OP 250 PS 637: Performed by: EMERGENCY MEDICINE

## 2021-10-10 PROCEDURE — U0003 INFECTIOUS AGENT DETECTION BY NUCLEIC ACID (DNA OR RNA); SEVERE ACUTE RESPIRATORY SYNDROME CORONAVIRUS 2 (SARS-COV-2) (CORONAVIRUS DISEASE [COVID-19]), AMPLIFIED PROBE TECHNIQUE, MAKING USE OF HIGH THROUGHPUT TECHNOLOGIES AS DESCRIBED BY CMS-2020-01-R: HCPCS | Performed by: NURSE PRACTITIONER

## 2021-10-10 RX ADMIN — RISPERIDONE 2 MG: 2 TABLET ORAL at 19:52

## 2021-10-10 RX ADMIN — ESCITALOPRAM OXALATE 20 MG: 10 TABLET ORAL at 08:03

## 2021-10-10 RX ADMIN — NICOTINE POLACRILEX 2 MG: 2 LOZENGE ORAL at 16:41

## 2021-10-10 RX ADMIN — HYDROXYZINE HYDROCHLORIDE 25 MG: 25 TABLET, FILM COATED ORAL at 14:14

## 2021-10-10 RX ADMIN — NICOTINE POLACRILEX 2 MG: 2 LOZENGE ORAL at 08:40

## 2021-10-10 RX ADMIN — NICOTINE POLACRILEX 2 MG: 2 LOZENGE ORAL at 12:27

## 2021-10-10 RX ADMIN — TRIAMCINOLONE ACETONIDE: 1 LOTION TOPICAL at 19:50

## 2021-10-10 RX ADMIN — HYDROXYZINE HYDROCHLORIDE 25 MG: 25 TABLET, FILM COATED ORAL at 19:52

## 2021-10-10 RX ADMIN — TRAZODONE HYDROCHLORIDE 50 MG: 50 TABLET ORAL at 19:52

## 2021-10-10 ASSESSMENT — MIFFLIN-ST. JEOR: SCORE: 1895.22

## 2021-10-10 ASSESSMENT — ACTIVITIES OF DAILY LIVING (ADL)
HYGIENE/GROOMING: INDEPENDENT
DRESS: INDEPENDENT
ORAL_HYGIENE: INDEPENDENT
LAUNDRY: WITH SUPERVISION

## 2021-10-10 NOTE — PLAN OF CARE
Problem: Adult Inpatient Plan of Care  Goal: Plan of Care Review  Outcome: Improving  Flowsheets (Taken 10/10/2021 5810)  Plan of Care Reviewed With: patient   Pt was in the milieu at the start of the shift. Affect full range, friendly approach; pt denied all psych Sxs and said that he wants to leave tomorrow instead of Tuesday. He went in and out of his room, somewhat isolative before supper; he came out to the lounge, glanced at the TV, and back to his room a couple of times. Pt had no c/o about his meds, sleep pattern, and appetite. He c/o no pain as well.

## 2021-10-10 NOTE — PLAN OF CARE
Pt was withdrawn, in and out of [employer] and his room. Pt was compliant with medications, ate meals. He refused his nicotine patch but requested nicotine lozenges instead. He requested prn hydroxyzine for anxiety. Pt spent time watching football in [facility], selectively social with peers. No SI/SIB noted or observed. Will continue to monitor.

## 2021-10-10 NOTE — PLAN OF CARE
Denied pain, anxiety '3'/10 and depression '0'/10. Said no SI or hallucinations and is isabell for safety. Asked for hydroxyzine then changed his mind and said he may decide to take this later. Reviewed plan of care for the evening and encouraged group attendance. Withdrawn, affect blunted and cooperative with cares.     Continues on suicide precautions

## 2021-10-11 VITALS
BODY MASS INDEX: 33.79 KG/M2 | WEIGHT: 215.3 LBS | OXYGEN SATURATION: 97 % | TEMPERATURE: 97.5 F | HEART RATE: 72 BPM | RESPIRATION RATE: 17 BRPM | DIASTOLIC BLOOD PRESSURE: 74 MMHG | SYSTOLIC BLOOD PRESSURE: 113 MMHG | HEIGHT: 67 IN

## 2021-10-11 LAB — SARS-COV-2 RNA RESP QL NAA+PROBE: NEGATIVE

## 2021-10-11 PROCEDURE — 250N000013 HC RX MED GY IP 250 OP 250 PS 637: Performed by: EMERGENCY MEDICINE

## 2021-10-11 PROCEDURE — 99239 HOSP IP/OBS DSCHRG MGMT >30: CPT | Performed by: NURSE PRACTITIONER

## 2021-10-11 PROCEDURE — 250N000013 HC RX MED GY IP 250 OP 250 PS 637: Performed by: NURSE PRACTITIONER

## 2021-10-11 RX ADMIN — ESCITALOPRAM OXALATE 20 MG: 10 TABLET ORAL at 08:39

## 2021-10-11 RX ADMIN — NICOTINE POLACRILEX 2 MG: 2 LOZENGE ORAL at 08:42

## 2021-10-11 NOTE — PLAN OF CARE
Patient requesting discharge.  Denies suicidal, self injurious or homicidal ideation.  Reviewed AVS and follow up appointments with patient who verbalizes understanding.  Patient agrees to follow all discharge instructions.  Belonging returned to patient.  Discharge medications reviewed with and given to patient who verbalizes understanding and agrees to follow medication regimen.  Patient has no questions prior to discharge.  Discharged via cab to mom's house.

## 2021-10-11 NOTE — DISCHARGE SUMMARY
"Psychiatric Discharge Summary    Terrance Fletcher MRN# [id number]   Age: 31 year old YOB: 1990     Date of Admission:  9/30/2021  Date of Discharge:  10/11/2021  Admitting Physician:  Margarita Lanza MD  Discharge Physician:  ELVIRA Stephen CNP (Contact: [phone])         Event Leading to Hospitalization:      From H&P 10/1/2021:    \"Hearing voices, being delusional.\"    Terrance Fletcher is a 31-year-old male admitted to [address] on 10/1/2021.  He was admitted as a voluntary patient through the ER due to suicidal ideation and psychosis in the context of methamphetamine use.  He is homeless.  He has been in CD treatment at [facility] and is uncertain whether he can return.  After being advised to make plans to discharge from the ER, he reported suicidal ideation with a plan to jump off a bridge.  UTOX was positive for cannabinoids and amphetamines.  He reports smoking meth and cannabis about 2 times per week.  He provided conflicting information regarding medication adherence.  He said he was \"maybe\" taking Lexapro and that he was not prescribed Zyprexa recently but would like to take it.  He was lying in bed with his eyes closed, guarded and participated minimally in the assessment.     A 10-point review of systems was completed and is negative with the exception of HPI, though ER notes indicate psoriatic lesions on his elbows and scabbing and excoriated areas on his face.    See full admission note by Kerri Rasmussen NP 10/1/2021 for details.           Diagnoses:     Substance-induced psychosis (methamphetamine, cannabis), resolved  Likely component of malingering  History of major depressive disorder  Rule out antisocial personality disorder  Methamphetamine use disorder  Cannabis use disorder  Nicotine use disorder  Psoriasis  Dog bite 9/30 with need for rabies vaccine injections 10/13 and 10/20          Labs:      Ref. Range 9/30/2021 23:01 9/30/2021 23:03 10/2/2021 07:30 " 10/6/2021 13:56   Sodium Latest Ref Range: 133 - 144 mmol/L   140    Potassium Latest Ref Range: 3.4 - 5.3 mmol/L   4.3    Chloride Latest Ref Range: 94 - 109 mmol/L   113 (H)    Carbon Dioxide Latest Ref Range: 20 - 32 mmol/L   25    Urea Nitrogen Latest Ref Range: 7 - 30 mg/dL   16    Creatinine Latest Ref Range: 0.66 - 1.25 mg/dL   1.00    GFR Estimate Latest Ref Range: >60 mL/min/1.73m2   >90    Calcium Latest Ref Range: 8.5 - 10.1 mg/dL   8.5    Anion Gap Latest Ref Range: 3 - 14 mmol/L   2 (L)    Albumin Latest Ref Range: 3.4 - 5.0 g/dL   3.1 (L)    Protein Total Latest Ref Range: 6.8 - 8.8 g/dL   6.0 (L)    Bilirubin Total Latest Ref Range: 0.2 - 1.3 mg/dL   0.3    Alkaline Phosphatase Latest Ref Range: 40 - 150 U/L   61    ALT Latest Ref Range: 0 - 70 U/L   33    AST Latest Ref Range: 0 - 45 U/L   12    Cholesterol Latest Ref Range: <200 mg/dL   142    HDL Cholesterol Latest Ref Range: >=40 mg/dL   27 (L)    LDL Cholesterol Calculated Latest Ref Range: <=100 mg/dL   83    Non HDL Cholesterol Latest Ref Range: <130 mg/dL   115    T4 Free Latest Ref Range: 0.76 - 1.46 ng/dL   1.00    Triglycerides Latest Ref Range: <150 mg/dL   160 (H)    TSH Latest Ref Range: 0.40 - 4.00 mU/L   0.32 (L)    Glucose Latest Ref Range: 70 - 99 mg/dL   91    WBC Latest Ref Range: 4.0 - 11.0 10e3/uL   7.4    Hemoglobin Latest Ref Range: 13.3 - 17.7 g/dL   14.3    Hematocrit Latest Ref Range: 40.0 - 53.0 %   43.7    Platelet Count Latest Ref Range: 150 - 450 10e3/uL   235    RBC Count Latest Ref Range: 4.40 - 5.90 10e6/uL   5.01    MCV Latest Ref Range: 78 - 100 fL   87    MCH Latest Ref Range: 26.5 - 33.0 pg   28.5    MCHC Latest Ref Range: 31.5 - 36.5 g/dL   32.7    RDW Latest Ref Range: 10.0 - 15.0 %   13.0    % Neutrophils Latest Units: %   52    % Lymphocytes Latest Units: %   34    % Monocytes Latest Units: %   9    % Eosinophils Latest Units: %   4    Absolute Basophils Latest Ref Range: 0.0 - 0.2 10e3/uL   0.1    % Basophils  Latest Units: %   1    Absolute Eosinophils Latest Ref Range: 0.0 - 0.7 10e3/uL   0.3    Absolute Immature Granulocytes Latest Ref Range: <=0.0 10e3/uL   0.0    Absolute Lymphocytes Latest Ref Range: 0.8 - 5.3 10e3/uL   2.5    Absolute Monocytes Latest Ref Range: 0.0 - 1.3 10e3/uL   0.6    % Immature Granulocytes Latest Units: %   0    Absolute Neutrophils Latest Ref Range: 1.6 - 8.3 10e3/uL   3.9    Absolute NRBCs Latest Units: 10e3/uL   0.0    NRBCs per 100 WBC Latest Ref Range: <1 /100   0    SARS CoV2 PCR Latest Ref Range: Negative   Negative  Negative   Amphetamine Qual Urine Latest Ref Range: Screen Negative  Screen Positive (A)      Cocaine Qual Urine Latest Ref Range: Screen Negative  Screen Negative      Benzodiazepine Qual Ur Latest Ref Range: Screen Negative  Screen Negative      Opiates Qualitative Urine Latest Ref Range: Screen Negative  Screen Negative      Cannabinoids Qual Urine Latest Ref Range: Screen Negative  Screen Positive (A)      Barbiturates Qual Urine Latest Ref Range: Screen Negative  Screen Negative             Consults:     Chemical dependency consult was completed 10/6/2021 and he was referred to    [facility] and [facility].    --------------------------------------------------------------------------------------    Brief Medicine Note 10/6/2021:     Contacted by psychiatry provider regarding patient reporting dog bite on 9/30 and found to have a right lateral thigh puncture wound.     Patient is able to demonstrate the site and says the dog is unknown to him and would not be available to observe or quarantine and does not known the dog's home or owner.     He denies symptoms and pain at the site is improved.  He never had pus or redness at the site. Denies fevers, chills, adenopathy or other signs of infection.  He states he had a tetnus shot at [facility] 3 days ago.      Today's vital signs, medications, and nursing notes were reviewed. Labs reviewed.      /75   Pulse 72    "Temp 97.3  F (36.3  C) (Temporal)   Resp 16   Ht 1.702 m (5' 7\")   Wt 94.3 kg (207 lb 12.8 oz)   SpO2 96%   BMI 32.55 kg/m    General: A&O. NAD. Ambulatory without assistance  Chest: nonlabored breathing  Skin: right lateral thigh puncture site is CDI and with punctate scabs, no erythema or drainage, trace bruise, no venous distention  Extremities: no edema      A/P:  Bite bite, noninfected  Discussed rabies and tetnus risks with patient and strongly recommend below regiment. Patient is refusing all vaccinations/immunoglobin at this time and voiced understanding of risks that include death and states \"I do not think I have rabies\"  -appreciate NEELAM harp  - TDap today if patient allows, currently refusing and states he had a vaccination 3 days ago that we do not have on record, as we do not have proof of vaccination   - rabies vaccine is recommended today (10/6), is 3 days (10/9), on day 7 (10/13) and day 14 (10/20)--assuming this start date, patient is currently refusing  - recommend immunoglobin injection (300units/kg at right lateral thigh wound site) on day 0 as well, patient refusing  - no indication for antibiotics as patient is now at day 7 post-bite and has no signs of infection, start antibiotics (such as Augmentin 875mg BID) if any signs if infection  - recommend dog be observed if available     - 2 nurses witnessed patient's refusal of cares, including the patient's primary nurse today    -------------------------------------------------------------------------------    The patient did subsequently cooperate with internal medicine's recommendations and received all vaccinations with the exception of rabies vaccines due 10/13 and 10/20.  He was instructed to go to an ER to receive these.           Hospital Course:     Terrance Fletcher was admitted to [facility] with attending Margarita Lanza MD, under the direct care of Kerri Rasmussen NP as a voluntary patient.  The patient was placed under status 15 " (15 minute checks) to ensure patient safety.     Lexapro 20 mg daily was continued.  He requested Zyprexa 10 mg at  for psychosis.  However this caused him to feel drowsy the following day, so it was replaced with Risperdal 2 mg, which he tolerated well and was effective.  As psychosis is likely substance-induced, he is hopeful to taper off Risperdal in the future.  Hydroxyzine 25 mg was available PRN for anxiety and Trazodone 50 mg was available PRN for insomnia.      Terrance Fletcher initially spent much of his time in bed and was guarded, dismissive, and occasionally irritable during interactions with staff.  He provided vague and inconsistent reports of symptoms.  As symptoms began to improve he participated in groups and spent time in the milieu.       The patient's symptoms of psychosis resolved.  He denied hallucinations and paranoid/delusioanl thought content.  Thoughts were well organized.  Mood was euthymic.  He denied feeling sad, anxious or irritable.  He denied suicidal ideation.  He ate and slept well.     Terrance Fletcher requested discharge 10/11/2021 and was released to his mother's home with plans to be admitted to [facility] on 10/12/2021.  The treatment team verified the plan with his mother.  At the time of discharge Terrance Fletcher was determined to not be a danger to himself or others.          Discharge Medications:      Terrance Fletcher   Home Medication Instructions IWONA:[id number]    Printed on:10/11/21 2205   Medication Information                      escitalopram (LEXAPRO) 20 MG tablet  Take 1 tablet (20 mg) by mouth daily             hydrOXYzine (ATARAX) 25 MG tablet  Take 1 tablet (25 mg) by mouth daily as needed for anxiety             rabies vaccine, PCEC, chick embryo derived, (RABAVERT) injection  Inject 1 mL into muscle on 10/13 and on 10/20.             risperiDONE (RISPERDAL) 2 MG tablet  Take 1 tablet (2 mg) by mouth At Bedtime             traZODone (DESYREL) 50 MG tablet  Take 1 tablet (50 mg) by  "mouth nightly as needed for sleep             triamcinolone (KENALOG) 0.1 % external cream  Apply topically 2 times daily .  Apply to elbows.                      Psychiatric Examination:     Appearance:  awake, alert, adequate grooming  Attitude:  cooperative  Eye Contact:  good  Mood:  \"good\"  Affect:  normal range  Speech:  clear, coherent  Psychomotor Behavior:  no evidence of tardive dyskinesia, dystonia, or tics  Thought Process:  linear and goal oriented  Associations:  no loose associations  Thought Content:  denies suicidal and homicidal ideation, no evidence of psychosis  Insight:  fair  Judgment:  fair  Oriented to:  time, date, person, place  Attention Span and Concentration:  fair, improved compared to admission  Recent and Remote Memory:  fair, improved compared to admission  Language:  intact, fluent English  Fund of Knowledge:  appropriate  Muscle Strength and Tone:  normal  Gait and Station:  normal     /74 (BP Location: Left arm)   Pulse 72   Temp 97.5  F (36.4  C) (Temporal)   Resp 17   Ht 1.702 m (5' 7\")   Wt 97.7 kg (215 lb 4.8 oz)   SpO2 97%   BMI 33.72 kg/m           Discharge Plan:     Per Discharge AVS:    Summary: You were admitted on 9/30/2021 due to suicidal ideations and chemical use issues.  You were treated by Kerri FELIX and discharged on 10/11/2021 from [facility] to your mom's home with the plan to go to the substance abuse treatment program [facility] on 10/12/21.    You were seen for a dog bite and received 2 rabies vaccine injections on 10/6 and 10/9.  Please go to an ER on 10/13 and again on 10/20 to receive the final 2 injections.      Health Care Follow-up:   Residential Substance Use Treatment  Admission date/time:  Tuesday October 12, 2021 at 12:00pm  Provider:  [facility]  Address:  [address] [place] [address] [phone]/Fax [phone]    [facility] coordinator/:   Tamera Arriola" ([phone])     Provider: Jessica Amezquita  Location:   [facility] [place] [address]  [place] [address]  Phone: [phone]  Fax: [phone]      He was provided with a 30-day supply of oral medications plus one refill.  He was advised to take his medications as prescribed and to abstain from alcohol and illicit substances.        Attestation:  The patient has been seen and evaluated by me, ELVIRA Stephen CNP   Discharge time > 30 minutes      Clinical Global Impressions  First:  Considering your total clinical experience with this particular patient population, how severe are the patient's symptoms at this time?: 6 (10/01/21 1436)  Compared to the patient's condition at the START of treatment, this patient's condition is: 4 (10/01/21 1436)  Most recent:  Considering your total clinical experience with this particular patient population, how severe are the patient's symptoms at this time?: 3 (10/11/21 0925)  Compared to the patient's condition at the START of treatment, this patient's condition is: 1 (10/11/21 0925)

## 2021-10-11 NOTE — PLAN OF CARE
Pt appeared to sleep approximately 7 Hours during the night, no problems reported, no prn's given, on status 15 minute safety checks.

## 2021-10-11 NOTE — PLAN OF CARE
Assessment/Intervention/Current Symptoms and Care Coordination:   MARGUERITE garcias [facility] with [facility], [phone]. CTC called back. Left message with information that pt is discharging today, will arrive there tomorrow after spending the night at mom's.     Discharge Plan or Goal:  Plan was to discharge to treatment, pt's admit date is 10/12. However, pt wants to discharge early to  belongings from [facility] and stay at mom's home overnight. Will go to [facility] tomorrow.     Barriers to Discharge:  None at this time    Referral Status:   Pt has been accepted to [facility]    Legal Status:   voluntary

## 2022-04-10 ENCOUNTER — TELEPHONE (OUTPATIENT)
Dept: BEHAVIORAL HEALTH | Facility: CLINIC | Age: 32
End: 2022-04-10

## 2022-04-10 ENCOUNTER — HOSPITAL ENCOUNTER (EMERGENCY)
Facility: CLINIC | Age: 32
Discharge: OTHER INSTITUTION | End: 2022-04-11
Attending: EMERGENCY MEDICINE | Admitting: EMERGENCY MEDICINE
Payer: COMMERCIAL

## 2022-04-10 DIAGNOSIS — R45.851 SUICIDAL IDEATION: ICD-10-CM

## 2022-04-10 DIAGNOSIS — F12.90 MARIJUANA USE: ICD-10-CM

## 2022-04-10 DIAGNOSIS — Z59.00 HOMELESS: ICD-10-CM

## 2022-04-10 DIAGNOSIS — F15.10 METHAMPHETAMINE ABUSE (H): ICD-10-CM

## 2022-04-10 DIAGNOSIS — F19.951 DRUG INDUCED HALLUCINATIONS (H): ICD-10-CM

## 2022-04-10 LAB
ALBUMIN SERPL-MCNC: 3.8 G/DL (ref 3.4–5)
ALP SERPL-CCNC: 65 U/L (ref 40–150)
ALT SERPL W P-5'-P-CCNC: 40 U/L (ref 0–70)
AMPHETAMINES UR QL SCN: ABNORMAL
ANION GAP SERPL CALCULATED.3IONS-SCNC: 3 MMOL/L (ref 3–14)
AST SERPL W P-5'-P-CCNC: 40 U/L (ref 0–45)
ATRIAL RATE - MUSE: 101 BPM
BARBITURATES UR QL: ABNORMAL
BASOPHILS # BLD AUTO: 0.1 10E3/UL (ref 0–0.2)
BASOPHILS NFR BLD AUTO: 1 %
BENZODIAZ UR QL: ABNORMAL
BILIRUB SERPL-MCNC: 0.7 MG/DL (ref 0.2–1.3)
BUN SERPL-MCNC: 16 MG/DL (ref 7–30)
CALCIUM SERPL-MCNC: 9.2 MG/DL (ref 8.5–10.1)
CANNABINOIDS UR QL SCN: ABNORMAL
CHLORIDE BLD-SCNC: 106 MMOL/L (ref 94–109)
CO2 SERPL-SCNC: 27 MMOL/L (ref 20–32)
COCAINE UR QL: ABNORMAL
CREAT SERPL-MCNC: 1 MG/DL (ref 0.66–1.25)
DIASTOLIC BLOOD PRESSURE - MUSE: NORMAL MMHG
EOSINOPHIL # BLD AUTO: 0.3 10E3/UL (ref 0–0.7)
EOSINOPHIL NFR BLD AUTO: 3 %
ERYTHROCYTE [DISTWIDTH] IN BLOOD BY AUTOMATED COUNT: 13.2 % (ref 10–15)
ETHANOL SERPL-MCNC: <0.01 G/DL
GFR SERPL CREATININE-BSD FRML MDRD: >90 ML/MIN/1.73M2
GLUCOSE BLD-MCNC: 82 MG/DL (ref 70–99)
HCT VFR BLD AUTO: 47.6 % (ref 40–53)
HGB BLD-MCNC: 15.4 G/DL (ref 13.3–17.7)
IMM GRANULOCYTES # BLD: 0.1 10E3/UL
IMM GRANULOCYTES NFR BLD: 0 %
INTERPRETATION ECG - MUSE: NORMAL
LYMPHOCYTES # BLD AUTO: 2.1 10E3/UL (ref 0.8–5.3)
LYMPHOCYTES NFR BLD AUTO: 18 %
MCH RBC QN AUTO: 27.7 PG (ref 26.5–33)
MCHC RBC AUTO-ENTMCNC: 32.4 G/DL (ref 31.5–36.5)
MCV RBC AUTO: 86 FL (ref 78–100)
MONOCYTES # BLD AUTO: 1.3 10E3/UL (ref 0–1.3)
MONOCYTES NFR BLD AUTO: 11 %
NEUTROPHILS # BLD AUTO: 7.7 10E3/UL (ref 1.6–8.3)
NEUTROPHILS NFR BLD AUTO: 67 %
NRBC # BLD AUTO: 0 10E3/UL
NRBC BLD AUTO-RTO: 0 /100
OPIATES UR QL SCN: ABNORMAL
P AXIS - MUSE: 49 DEGREES
PLATELET # BLD AUTO: 245 10E3/UL (ref 150–450)
POTASSIUM BLD-SCNC: 4.3 MMOL/L (ref 3.4–5.3)
PR INTERVAL - MUSE: 144 MS
PROT SERPL-MCNC: 7.1 G/DL (ref 6.8–8.8)
QRS DURATION - MUSE: 84 MS
QT - MUSE: 324 MS
QTC - MUSE: 420 MS
R AXIS - MUSE: 2 DEGREES
RBC # BLD AUTO: 5.56 10E6/UL (ref 4.4–5.9)
SARS-COV-2 RNA RESP QL NAA+PROBE: NEGATIVE
SODIUM SERPL-SCNC: 136 MMOL/L (ref 133–144)
SYSTOLIC BLOOD PRESSURE - MUSE: NORMAL MMHG
T AXIS - MUSE: 24 DEGREES
VENTRICULAR RATE- MUSE: 101 BPM
WBC # BLD AUTO: 11.4 10E3/UL (ref 4–11)

## 2022-04-10 PROCEDURE — 85014 HEMATOCRIT: CPT | Performed by: EMERGENCY MEDICINE

## 2022-04-10 PROCEDURE — 36415 COLL VENOUS BLD VENIPUNCTURE: CPT | Performed by: EMERGENCY MEDICINE

## 2022-04-10 PROCEDURE — 93005 ELECTROCARDIOGRAM TRACING: CPT | Mod: RTG

## 2022-04-10 PROCEDURE — U0005 INFEC AGEN DETEC AMPLI PROBE: HCPCS | Performed by: INTERNAL MEDICINE

## 2022-04-10 PROCEDURE — 250N000013 HC RX MED GY IP 250 OP 250 PS 637: Performed by: INTERNAL MEDICINE

## 2022-04-10 PROCEDURE — 90791 PSYCH DIAGNOSTIC EVALUATION: CPT

## 2022-04-10 PROCEDURE — 80053 COMPREHEN METABOLIC PANEL: CPT | Performed by: EMERGENCY MEDICINE

## 2022-04-10 PROCEDURE — 99285 EMERGENCY DEPT VISIT HI MDM: CPT | Mod: 25

## 2022-04-10 PROCEDURE — 82077 ASSAY SPEC XCP UR&BREATH IA: CPT | Performed by: EMERGENCY MEDICINE

## 2022-04-10 PROCEDURE — 80307 DRUG TEST PRSMV CHEM ANLYZR: CPT | Performed by: EMERGENCY MEDICINE

## 2022-04-10 PROCEDURE — 250N000013 HC RX MED GY IP 250 OP 250 PS 637: Performed by: EMERGENCY MEDICINE

## 2022-04-10 PROCEDURE — C9803 HOPD COVID-19 SPEC COLLECT: HCPCS

## 2022-04-10 RX ORDER — QUETIAPINE FUMARATE 50 MG/1
100 TABLET, FILM COATED ORAL 3 TIMES DAILY PRN
Status: DISCONTINUED | OUTPATIENT
Start: 2022-04-10 | End: 2022-04-11 | Stop reason: HOSPADM

## 2022-04-10 RX ORDER — OLANZAPINE 5 MG/1
10 TABLET, ORALLY DISINTEGRATING ORAL
Status: COMPLETED | OUTPATIENT
Start: 2022-04-10 | End: 2022-04-10

## 2022-04-10 RX ORDER — OLANZAPINE 5 MG/1
10 TABLET, ORALLY DISINTEGRATING ORAL 2 TIMES DAILY PRN
Status: DISCONTINUED | OUTPATIENT
Start: 2022-04-10 | End: 2022-04-11 | Stop reason: HOSPADM

## 2022-04-10 RX ADMIN — OLANZAPINE 10 MG: 5 TABLET, ORALLY DISINTEGRATING ORAL at 04:44

## 2022-04-10 RX ADMIN — OLANZAPINE 10 MG: 5 TABLET, ORALLY DISINTEGRATING ORAL at 16:16

## 2022-04-10 SDOH — ECONOMIC STABILITY - HOUSING INSECURITY: HOMELESSNESS UNSPECIFIED: Z59.00

## 2022-04-10 ASSESSMENT — ENCOUNTER SYMPTOMS
CONFUSION: 1
DECREASED CONCENTRATION: 1
HEADACHES: 0

## 2022-04-10 NOTE — ED NOTES
"Patient requesting zyprexa and stated he takes it when he is \"coming down from stuff.\" Writer asked what he was coming down from and patient stated \"drugs.\" Writer asked what drugs he took and he stated \"probably meth.\" Patient does not know what time he last did meth. Assisted with ordering food. Denies all other needs.   "

## 2022-04-10 NOTE — ED PROVIDER NOTES
[facility] ED Behavioral Health Handoff Note:       Brief HPI:  This is a 31 year old male signed out to me by Dr. Soto .  See initial ED Provider note for details of the presentation.     Patient is medically cleared for admission to a Behavioral Health unit.      Pending studies:NA.      Hold Status:  Active Orders   Legal    Legal Status: RC - Health Officer Authority to Detain     Frequency: Effective Now     Start Date/Time: 04/10/22 0356      Number of Occurrences: Until Specified       The patient has required medication for agitation.    Exam:   Temp:  [98.9  F (37.2  C)] 98.9  F (37.2  C)  Pulse:  [] 81  Resp:  [22] 22  BP: (130-186)/() 138/83  SpO2:  [92 %-99 %] 92 %    Sleeping on [facility]    ED Course:    Medications   0.9% sodium chloride BOLUS (1,000 mLs Intravenous Not Given 4/10/22 0615)   OLANZapine zydis (zyPREXA) ODT tab 10 mg (10 mg Oral Given 4/10/22 0444)       There were no significant events while under my care.      Patient was signed out to the oncoming provider. Dr. Salgado      Impression:    ICD-10-CM    1. Mental health problem  F48.9        Plan:    1. Await Transfer to Mental Health Facility      RESULTS:   Results for orders placed or performed during the hospital encounter of 04/10/22 (from the past 24 hour(s))   EKG 12-lead, tracing only     Status: None    Collection Time: 04/10/22  4:25 AM   Result Value Ref Range    Systolic Blood Pressure  mmHg    Diastolic Blood Pressure  mmHg    Ventricular Rate 101 BPM    Atrial Rate 101 BPM    OR Interval 144 ms    QRS Duration 84 ms     ms    QTc 420 ms    P Axis 49 degrees    R AXIS 2 degrees    T Axis 24 degrees    Interpretation ECG       Sinus tachycardia  Otherwise normal ECG  No previous ECGs available  Confirmed by - EMERGENCY ROOM, PHYSICIAN ([id number]),  MARIBEL VALDEZ ([id number]) on 4/10/2022 7:23:48 AM     CBC with platelets differential     Status: Abnormal    Collection Time: 04/10/22  5:16 AM     Narrative    The following orders were created for panel order CBC with platelets differential.  Procedure                               Abnormality         Status                     ---------                               -----------         ------                     CBC with platelets and d...[[id number]]  Abnormal            Final result                 Please view results for these tests on the individual orders.   Comprehensive metabolic panel     Status: Normal    Collection Time: 04/10/22  5:16 AM   Result Value Ref Range    Sodium 136 133 - 144 mmol/L    Potassium 4.3 3.4 - 5.3 mmol/L    Chloride 106 94 - 109 mmol/L    Carbon Dioxide (CO2) 27 20 - 32 mmol/L    Anion Gap 3 3 - 14 mmol/L    Urea Nitrogen 16 7 - 30 mg/dL    Creatinine 1.00 0.66 - 1.25 mg/dL    Calcium 9.2 8.5 - 10.1 mg/dL    Glucose 82 70 - 99 mg/dL    Alkaline Phosphatase 65 40 - 150 U/L    AST 40 0 - 45 U/L    ALT 40 0 - 70 U/L    Protein Total 7.1 6.8 - 8.8 g/dL    Albumin 3.8 3.4 - 5.0 g/dL    Bilirubin Total 0.7 0.2 - 1.3 mg/dL    GFR Estimate >90 >60 mL/min/1.73m2   Alcohol ethyl     Status: Normal    Collection Time: 04/10/22  5:16 AM   Result Value Ref Range    Alcohol ethyl <0.01 <=0.01 g/dL   CBC with platelets and differential     Status: Abnormal    Collection Time: 04/10/22  5:16 AM   Result Value Ref Range    WBC Count 11.4 (H) 4.0 - 11.0 10e3/uL    RBC Count 5.56 4.40 - 5.90 10e6/uL    Hemoglobin 15.4 13.3 - 17.7 g/dL    Hematocrit 47.6 40.0 - 53.0 %    MCV 86 78 - 100 fL    MCH 27.7 26.5 - 33.0 pg    MCHC 32.4 31.5 - 36.5 g/dL    RDW 13.2 10.0 - 15.0 %    Platelet Count 245 150 - 450 10e3/uL    % Neutrophils 67 %    % Lymphocytes 18 %    % Monocytes 11 %    % Eosinophils 3 %    % Basophils 1 %    % Immature Granulocytes 0 %    NRBCs per 100 WBC 0 <1 /100    Absolute Neutrophils 7.7 1.6 - 8.3 10e3/uL    Absolute Lymphocytes 2.1 0.8 - 5.3 10e3/uL    Absolute Monocytes 1.3 0.0 - 1.3 10e3/uL    Absolute Eosinophils 0.3 0.0 - 0.7  10e3/uL    Absolute Basophils 0.1 0.0 - 0.2 10e3/uL    Absolute Immature Granulocytes 0.1 <=0.4 10e3/uL    Absolute NRBCs 0.0 10e3/uL             MD Fidencio Muniz, Sushil David MD  04/10/22 1946

## 2022-04-10 NOTE — ED TRIAGE NOTES
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 Patient then fled and was eventually contacted by EMS who transported him here. Patient is a poor historian and is unable to provide information on how he came to [place] from [place]. Patient states he is unsure if he used any substances tonight. Patient keeps glancing to the corner of the room and does not make make consistent eye contact but denies hallucinations during triage. ABCs intact
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
## 2022-04-10 NOTE — ED NOTES
Pt asleep on bed with sheet covered over face due to lights per pt. VS taken, pt alert but not oriented. Pt able to answer yes or no questions, pt given blanket and lights turned down for pt comfort.

## 2022-04-10 NOTE — ED PROVIDER NOTES
I assumed care of the patient.     1541 I rechecked the patient.  He tells me now that he does not know how he came to be here.  He says he thinks he was hallucinating last night.  When I asked him why he would be hallucinating he says drugs.  He acknowledges using methamphetamine.  Patient tells me he is homeless and does not have any issues.  He says that if released from here he would jump off a bridge in order to kill himself.  He thinks he needs to be admitted to the psychiatric unit.    Mental health boarding orders done.  I have placed the patient on a 72-hour hold.    Interventions:   1616: OLANZapine zydis (zyPREXA) ODT tab 10 mg, PO     Consults:   2019 I spoke with DEC regarding the patient.     Disposition:    The patient was signed out to my colleague Dr. Lacey pending final disposition.     I, Reny Broussard, am serving as a scribe at 2:51 PM on 4/10/2022 to document services personally performed by Anastasiia Maldonado MD based on my observations and the provider's statements to me.     Anastasiia Maldonado MD  04/10/22 1489

## 2022-04-11 ENCOUNTER — TELEPHONE (OUTPATIENT)
Dept: BEHAVIORAL HEALTH | Facility: HOSPITAL | Age: 32
End: 2022-04-11

## 2022-04-11 ENCOUNTER — TELEPHONE (OUTPATIENT)
Dept: BEHAVIORAL HEALTH | Facility: CLINIC | Age: 32
End: 2022-04-11

## 2022-04-11 ENCOUNTER — HOSPITAL ENCOUNTER (INPATIENT)
Facility: CLINIC | Age: 32
LOS: 14 days | Discharge: PSYCHIATRIC OR SUBSTANCE USE FACILITY | End: 2022-04-25
Attending: PSYCHIATRY & NEUROLOGY | Admitting: PSYCHIATRY & NEUROLOGY
Payer: COMMERCIAL

## 2022-04-11 VITALS
WEIGHT: 203.2 LBS | OXYGEN SATURATION: 94 % | DIASTOLIC BLOOD PRESSURE: 104 MMHG | TEMPERATURE: 98.9 F | HEART RATE: 115 BPM | SYSTOLIC BLOOD PRESSURE: 128 MMHG | RESPIRATION RATE: 18 BRPM | BODY MASS INDEX: 31.83 KG/M2

## 2022-04-11 DIAGNOSIS — F29 PSYCHOSIS, UNSPECIFIED PSYCHOSIS TYPE (H): Primary | ICD-10-CM

## 2022-04-11 DIAGNOSIS — K21.9 GASTROESOPHAGEAL REFLUX DISEASE WITHOUT ESOPHAGITIS: ICD-10-CM

## 2022-04-11 DIAGNOSIS — L40.9 PSORIASIS: ICD-10-CM

## 2022-04-11 DIAGNOSIS — G47.00 INSOMNIA, UNSPECIFIED TYPE: ICD-10-CM

## 2022-04-11 DIAGNOSIS — F22 PARANOIA (H): ICD-10-CM

## 2022-04-11 DIAGNOSIS — F41.1 GAD (GENERALIZED ANXIETY DISORDER): ICD-10-CM

## 2022-04-11 PROCEDURE — 124N000002 HC R&B MH UMMC

## 2022-04-11 PROCEDURE — 250N000013 HC RX MED GY IP 250 OP 250 PS 637: Performed by: INTERNAL MEDICINE

## 2022-04-11 RX ORDER — GABAPENTIN 100 MG/1
100 CAPSULE ORAL EVERY 6 HOURS PRN
Status: DISCONTINUED | OUTPATIENT
Start: 2022-04-11 | End: 2022-04-13

## 2022-04-11 RX ORDER — TRAZODONE HYDROCHLORIDE 50 MG/1
50 TABLET, FILM COATED ORAL
Status: DISCONTINUED | OUTPATIENT
Start: 2022-04-11 | End: 2022-04-25 | Stop reason: HOSPADM

## 2022-04-11 RX ORDER — OLANZAPINE 10 MG/1
10 TABLET ORAL 3 TIMES DAILY PRN
Status: DISCONTINUED | OUTPATIENT
Start: 2022-04-11 | End: 2022-04-25 | Stop reason: HOSPADM

## 2022-04-11 RX ORDER — ACETAMINOPHEN 325 MG/1
650 TABLET ORAL EVERY 4 HOURS PRN
Status: DISCONTINUED | OUTPATIENT
Start: 2022-04-11 | End: 2022-04-25 | Stop reason: HOSPADM

## 2022-04-11 RX ORDER — ESCITALOPRAM OXALATE 20 MG/1
20 TABLET ORAL DAILY
Status: DISCONTINUED | OUTPATIENT
Start: 2022-04-12 | End: 2022-04-13

## 2022-04-11 RX ORDER — MAGNESIUM HYDROXIDE/ALUMINUM HYDROXICE/SIMETHICONE 120; 1200; 1200 MG/30ML; MG/30ML; MG/30ML
30 SUSPENSION ORAL EVERY 4 HOURS PRN
Status: DISCONTINUED | OUTPATIENT
Start: 2022-04-11 | End: 2022-04-25 | Stop reason: HOSPADM

## 2022-04-11 RX ORDER — OLANZAPINE 10 MG/2ML
10 INJECTION, POWDER, FOR SOLUTION INTRAMUSCULAR 3 TIMES DAILY PRN
Status: DISCONTINUED | OUTPATIENT
Start: 2022-04-11 | End: 2022-04-25 | Stop reason: HOSPADM

## 2022-04-11 RX ORDER — NICOTINE 21 MG/24HR
1 PATCH, TRANSDERMAL 24 HOURS TRANSDERMAL DAILY
Status: DISCONTINUED | OUTPATIENT
Start: 2022-04-11 | End: 2022-04-13

## 2022-04-11 RX ADMIN — OLANZAPINE 10 MG: 5 TABLET, ORALLY DISINTEGRATING ORAL at 18:30

## 2022-04-11 NOTE — TELEPHONE ENCOUNTER
R:  00:08 Discussed w/ on call for [facility], who declines pt d/t acuity - unit milieu has very high acuity currently. Intake to assess alternative placement options.    Continuing bed search @ 02:30:    [facility]: No appropriate beds available  [facility]: @ cap per website  [facility]:@ cap per website  [facility]: @ cap per website  [facility]: @ cap per website  [facility]: @ cap per website  [facility]: @ cap per website  [facility]: @ cap per website  [facility]: @ cap per website  [facility]: @ cap per website  [facility]: @ cap per website  [facility]: @ cap per website  [facility]: @ cap per website  [facility]: Posting bed availability. Per Lauren @ 02:24, they are only able to review for very low acuity/mood d/os - no psychosis, sherrell nor aggression at this time. Meets exclusionary criteria  [facility]: Per previous calls made by this writer, facility cannot review overnight  [facility]: @ cap per website  [facility]: Posting 4 beds. Must have the cognitive ability to do programming. No aggressive or violent behavior or recent HX in the last 2 yrs. MH must be primary. Per Jayden @ 02:27, based on screening questions, pt not appropriate for their facility/unit d/t psychosis/acuity   [facility]: @ cap per website  [facility]: Posting 2 beds. Low acuity, Neg Covid. Per Claudine @ 02:33, they are on [facility]: @ cap per website  [facility]: Posting 8 beds. Per Germaine @ 02:40 she is currently reviewing many referrals and recommends intake call back later this morning to see if beds are still available  [facility]: @ cap per website    Pt remains on work list until appropriate placement is available

## 2022-04-11 NOTE — ED NOTES
Pt. Up and to the bedside commode. Pt. Refused to be helped by writer. New sheets placed on bed and new brief underwear given to pt.

## 2022-04-11 NOTE — SAFE
Terrance DORSEY Chance  April 10, 2022    SAFE Note    Critical Safety Issues: Present due psychosis in the context of meth use and suicidal ideation.       Current Suicidal Ideation/Self-Injurious Concerns/Methods: Jumping (from building, into traffic, etc.)      Current or Historical Inappropriate Sexual Behavior: No      Current or Historical Aggression/Homicidal Ideation: Agitation/Hyperactivity      Triggers: None noted.    Updated care team: Yes: ED provider in support of admission.    For additional details see full LMHP assessment.       Myah Lockwood, JAVISW

## 2022-04-11 NOTE — PHARMACY-ADMISSION MEDICATION HISTORY
Admission medication history interview status for this patient is complete. See Psychiatric admission navigator for allergy information, prior to admission medications and immunization status.     Medication history interview source(s):Patient  Medication history resources (including written lists, pill bottles, clinic record):None  Primary pharmacy:unknown    Changes made to PTA medication list:  Added: none  Deleted: risperidone, hydroxyzine, trazodone  Changed: none    Actions taken by pharmacist (provider contacted, etc):None     Additional medication history information:None    Medication reconciliation/reorder completed by provider prior to medication history? No    For patients on insulin therapy: No (Yes/No)     Prior to Admission medications    Medication Sig Last Dose Taking? Auth Provider   escitalopram (LEXAPRO) 20 MG tablet Take 1 tablet (20 mg) by mouth daily Unknown at Unknown time Yes Tanya Rasmussen APRN CNP
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
## 2022-04-11 NOTE — ED PROVIDER NOTES
Signout received at change of shift.  Briefly, this is a 31-year-old male with history of drug-induced psychosis, suicidal ideation, homelessness, who is awaiting mental health bed.  He has refused Zyprexa, but has been fairly calm and cooperative in the ED.     Miriam Coronado MD  04/12/22 3220

## 2022-04-11 NOTE — ED NOTES
Triage & Transition Services, Extended Care     Terrance Fletcher  April 11, 2022    Terrance is followed related to Placement delay: 32+ hours awaiting placement.  . Please see initial DEC Crisis Assessment completed for complete assessment information. Medical record is reviewed.      There are not significant status changes. Full DEC Reassessment is not recommended at this time. Extended Care continues to be available for review of changes to initial crisis state resulting in this encounter.     Recommend IP MH  to continue care coordination with ED staff and pt as needed.    [id number] Attempted to meet with Terrance, he reports not wanting to be dumped out to the streets.  He does report struggling with anxiety and depression along with substance use  He says he is voluntary to go inpatient and wants to go inpatient.  He reports just wanting to go back to sleep and turned the ipad around.    1213 called ED talked to Kerri, she is aware.       Extended Care will follow and meet with patient/family/care team as able or requested.     DAISY Guzman  [facility], Extended Care   [phone]

## 2022-04-12 LAB
ALBUMIN SERPL-MCNC: 3.4 G/DL (ref 3.4–5)
ALP SERPL-CCNC: 66 U/L (ref 40–150)
ALT SERPL W P-5'-P-CCNC: 26 U/L (ref 0–70)
ANION GAP SERPL CALCULATED.3IONS-SCNC: 7 MMOL/L (ref 3–14)
AST SERPL W P-5'-P-CCNC: 13 U/L (ref 0–45)
BASOPHILS # BLD AUTO: 0.1 10E3/UL (ref 0–0.2)
BASOPHILS NFR BLD AUTO: 1 %
BILIRUB SERPL-MCNC: 0.3 MG/DL (ref 0.2–1.3)
BUN SERPL-MCNC: 12 MG/DL (ref 7–30)
CALCIUM SERPL-MCNC: 9 MG/DL (ref 8.5–10.1)
CHLORIDE BLD-SCNC: 110 MMOL/L (ref 94–109)
CHOLEST SERPL-MCNC: 157 MG/DL
CO2 SERPL-SCNC: 23 MMOL/L (ref 20–32)
CREAT SERPL-MCNC: 1.01 MG/DL (ref 0.66–1.25)
EOSINOPHIL # BLD AUTO: 0.5 10E3/UL (ref 0–0.7)
EOSINOPHIL NFR BLD AUTO: 6 %
ERYTHROCYTE [DISTWIDTH] IN BLOOD BY AUTOMATED COUNT: 13.2 % (ref 10–15)
GFR SERPL CREATININE-BSD FRML MDRD: >90 ML/MIN/1.73M2
GGT SERPL-CCNC: 12 U/L (ref 0–75)
GLUCOSE BLD-MCNC: 86 MG/DL (ref 70–99)
HCT VFR BLD AUTO: 47.7 % (ref 40–53)
HDLC SERPL-MCNC: 29 MG/DL
HGB BLD-MCNC: 15.8 G/DL (ref 13.3–17.7)
IMM GRANULOCYTES # BLD: 0 10E3/UL
IMM GRANULOCYTES NFR BLD: 0 %
LDLC SERPL CALC-MCNC: 96 MG/DL
LYMPHOCYTES # BLD AUTO: 2.5 10E3/UL (ref 0.8–5.3)
LYMPHOCYTES NFR BLD AUTO: 32 %
MCH RBC QN AUTO: 28.4 PG (ref 26.5–33)
MCHC RBC AUTO-ENTMCNC: 33.1 G/DL (ref 31.5–36.5)
MCV RBC AUTO: 86 FL (ref 78–100)
MONOCYTES # BLD AUTO: 0.7 10E3/UL (ref 0–1.3)
MONOCYTES NFR BLD AUTO: 9 %
NEUTROPHILS # BLD AUTO: 4 10E3/UL (ref 1.6–8.3)
NEUTROPHILS NFR BLD AUTO: 52 %
NONHDLC SERPL-MCNC: 128 MG/DL
NRBC # BLD AUTO: 0 10E3/UL
NRBC BLD AUTO-RTO: 0 /100
PLATELET # BLD AUTO: 235 10E3/UL (ref 150–450)
POTASSIUM BLD-SCNC: 4 MMOL/L (ref 3.4–5.3)
PROT SERPL-MCNC: 6.8 G/DL (ref 6.8–8.8)
RBC # BLD AUTO: 5.56 10E6/UL (ref 4.4–5.9)
SODIUM SERPL-SCNC: 140 MMOL/L (ref 133–144)
T4 FREE SERPL-MCNC: 1.12 NG/DL (ref 0.76–1.46)
TRIGL SERPL-MCNC: 160 MG/DL
TSH SERPL DL<=0.005 MIU/L-ACNC: 0.14 MU/L (ref 0.4–4)
WBC # BLD AUTO: 7.8 10E3/UL (ref 4–11)

## 2022-04-12 PROCEDURE — 250N000013 HC RX MED GY IP 250 OP 250 PS 637: Performed by: PSYCHIATRY & NEUROLOGY

## 2022-04-12 PROCEDURE — 99223 1ST HOSP IP/OBS HIGH 75: CPT | Mod: AI

## 2022-04-12 PROCEDURE — 124N000002 HC R&B MH UMMC

## 2022-04-12 PROCEDURE — 84439 ASSAY OF FREE THYROXINE: CPT | Performed by: PSYCHIATRY & NEUROLOGY

## 2022-04-12 PROCEDURE — 250N000013 HC RX MED GY IP 250 OP 250 PS 637

## 2022-04-12 PROCEDURE — 85025 COMPLETE CBC W/AUTO DIFF WBC: CPT | Performed by: PSYCHIATRY & NEUROLOGY

## 2022-04-12 PROCEDURE — 84443 ASSAY THYROID STIM HORMONE: CPT | Performed by: PSYCHIATRY & NEUROLOGY

## 2022-04-12 PROCEDURE — 36415 COLL VENOUS BLD VENIPUNCTURE: CPT | Performed by: PSYCHIATRY & NEUROLOGY

## 2022-04-12 PROCEDURE — 80053 COMPREHEN METABOLIC PANEL: CPT | Performed by: PSYCHIATRY & NEUROLOGY

## 2022-04-12 PROCEDURE — 82977 ASSAY OF GGT: CPT | Performed by: PSYCHIATRY & NEUROLOGY

## 2022-04-12 PROCEDURE — 80061 LIPID PANEL: CPT | Performed by: PSYCHIATRY & NEUROLOGY

## 2022-04-12 RX ORDER — RISPERIDONE 2 MG/1
2 TABLET ORAL AT BEDTIME
Status: DISCONTINUED | OUTPATIENT
Start: 2022-04-12 | End: 2022-04-25 | Stop reason: HOSPADM

## 2022-04-12 RX ADMIN — GABAPENTIN 100 MG: 100 CAPSULE ORAL at 16:26

## 2022-04-12 RX ADMIN — RISPERIDONE 2 MG: 2 TABLET ORAL at 21:28

## 2022-04-12 ASSESSMENT — ACTIVITIES OF DAILY LIVING (ADL)
HYGIENE/GROOMING: INDEPENDENT
DRESS: INDEPENDENT
HYGIENE/GROOMING: INDEPENDENT
ORAL_HYGIENE: INDEPENDENT
ORAL_HYGIENE: INDEPENDENT
DRESS: SCRUBS (BEHAVIORAL HEALTH);INDEPENDENT

## 2022-04-12 NOTE — PLAN OF CARE
Pt appeared to sleep 6.75 hours. No concerns reported. Safety checks done at least every 15 minutes.

## 2022-04-12 NOTE — H&P
"History and Physical    Terrance Fletcher MRN# [id number]   Age: 31 year old YOB: 1990     Date of Admission:  4/11/2022          Contacts:     PCP - Dr. Jessica Gordon [facility]'s         Assessment:     This patient is a 31 year old  male with a significant history of substance abuse, Depression and Anxiety.          Diagnoses:     Psychosis Unspecified Psychosis type (H)  Substance-induced psychosis (methamphetamine, cannabis)  Likely component of malingering  History of major depressive disorder  Rule out antisocial personality disorder  Methamphetamine use disorder  Cannabis use disorder  Nicotine use disorder  Psoriasis        Plan: Recommendations     Admit to Unit: 32N    Attending Physician: Dr. Lanza, under the direct care of Vero Mauro NP    Patient is: 72 hour mental health hold     Routine lab studies have been requested     Monitor for target symptoms.     Provide a safe environment and therapeutic milieu.     Medications: Review and resume medications.  The patient would like to resume his medications. He states he does not remember the last time he took medications. On further questioning he states \"3 months ago\". He responds with \"I don't now\" or \"I don't remember\" to most questions. Records indicate that he  took Lexapro, 20 mg, Zyprexa 10 mg at HS. Risperdal 2 mg,  PRNs of Zyprexa, Hydroxyzine and Trazodone are available.       He states he would like CD treatment. Patient recently attended [facility] and then spent some time at a friend before he relapsed. Denies having any coping skills.    Attestation:  Patient has been seen and evaluated by me,  VERO MAURO, APRN CNP   on 4/12/2022. The patient was counseled on nature of illness and treatment plan/options. Care was coordinated with treatment team.     Clinical Global Impressions  First:  Considering your total clinical experience with this particular patient population, how severe are the " "patient's symptoms at this time?: 3 (10/11/2021  9:25 AM)    Most recent:  Compared to the patient's condition at the START of treatment, this patient's condition is: 1 (10/11/2021  9:25 AM)         Chief Complaint:     \"I relapsed\".         History of Present Illness:     Terrance Fletcher is a 31 year old male with history of methamphetamine abuse with associated psychosis who presents with mental health problem. EMS reported that patient woke up in the walk-in cooler of a [employer] where he works in [place]. On arrival Terrance believed that he was being chased. He then fled and was eventually contacted by EMS who transported him here. He dose not know how he came to [place] from [place]. Patient stated he was unsure if he used any substances. He kept glancing to the corner of the room and did not make make consistent eye contact but denied hallucinations during triage. Patient reported relapsing from Meth use and has a long history of substance abuse and CD treatments. He was recently discharged into a treatment facility and relapsed after discharging and going to a friends house. He currently denies suicidal ideations, denied visual or auditory hallucinations. He reports feeling \"irritable\" and endorses symptoms of depression and anxiety rating depression at \"6/10\" and anxiety at \"8/10\".  He reports a time frame of three months for symptoms with no coping skills, drug use and  homelessness as a exacerbating factors.               Psychiatric Review of Systems:     Psychiatric review of systems reveals:    A 10-point review of systems was completed and is negative with the exception of HPI, though ER notes indicate psoriatic lesions on his elbows and scabbing and excoriated areas on his face.    Positive for social anxiety  Reported having \"depressed mood\", low energy, low interest, poor concentration, helplessness and hopelessness.  Reported symptoms of anxiety, feeling \"irritale\", worrying, difficultly " "sleeping. Endorses social anxiety. Denied visual, auditory, hallucinations. Denies delusional thoughts. Denies symptoms of eating disorder. Denies having any traumatizing vents in his life or having nightmares.              Medical Review of Systems:     A 10-point review of systems was completed and is negative with the exception of HPI.         Psychiatric History:     Past Medical History:     Antisocial personality disorder (H)        Depressive disorder      Hallucinations    Poison ivy          Psoriasis           Psychosis   Substance abuse   RAKESH  Severe episode of recurrent major depressive disorder  Homeless  Methamphetamine use disorder with psychosis     Past Surgical History:    Cosmetic facial surgery     Family History:    CAD  Hypertension  Diabetes type I     Social History:  Patient unaccompanied  PCP: Jessica Amezquita          Substance Use History:     Records indicate a history of alcohol abuse, Methamphetamines, Benzodiazepines and Cannabis. He endorses using Meth and has been in several treatment facilities for substance abuse. Records indicate 14 CD. Last treatment facility was \" [facility]\" prior to relapsing from a friends house. Negative for cocaine use, Hallucinogens, inhalants, diet pills and Opium/Morphine/Narcotics,          Past Medical History:     Psoriasis  GERD     Primary Care Physician: Jessica Amezquita    No History of: hepatitis, HIV, head trauma with or without loss of consciousness and seizures         Past Surgical History:     Cosmetic surgery, per chart review, though the patient denies any history of surgery           Allergies:    No Known Allergies           Medications:     Current Facility-Administered Medications   Medication     acetaminophen (TYLENOL) tablet 650 mg     alum & mag hydroxide-simethicone (MAALOX) suspension 30 mL     [Held by provider] escitalopram (LEXAPRO) tablet 20 mg     gabapentin (NEURONTIN) capsule 100 mg     nicotine (NICODERM CQ) 21 " "MG/24HR 24 hr patch 1 patch     nicotine Patch in Place     nicotine polacrilex (NICORETTE) gum 4 mg     OLANZapine (zyPREXA) tablet 10 mg    Or     OLANZapine (zyPREXA) injection 10 mg     risperiDONE (risperDAL) tablet 2 mg     traZODone (DESYREL) tablet 50 mg            Social History:     He was raised by his mother and stepfather.  He has 3 younger siblings.  He graduated high school.  He is currently not employed and has not held a job for a long period of tie. He was  in 2018 and has a son.  He is homeless and states he was staying with a friend when he relapsed. He was recently at [facility] treatment prior to going to the friends. No miliary history.         Family History:      Patient responded \"I don't know\" to questions. Records indicate history of Autism for one brother and a history of ADHD for another brother. Biological father has a history of Alcohol Use Disorder.          Labs:     Recent Results (from the past 24 hour(s))   GGT    Collection Time: 04/12/22  6:42 AM   Result Value Ref Range    GGT 12 0 - 75 U/L   Comprehensive metabolic panel    Collection Time: 04/12/22  6:42 AM   Result Value Ref Range    Sodium 140 133 - 144 mmol/L    Potassium 4.0 3.4 - 5.3 mmol/L    Chloride 110 (H) 94 - 109 mmol/L    Carbon Dioxide (CO2) 23 20 - 32 mmol/L    Anion Gap 7 3 - 14 mmol/L    Urea Nitrogen 12 7 - 30 mg/dL    Creatinine 1.01 0.66 - 1.25 mg/dL    Calcium 9.0 8.5 - 10.1 mg/dL    Glucose 86 70 - 99 mg/dL    Alkaline Phosphatase 66 40 - 150 U/L    AST 13 0 - 45 U/L    ALT 26 0 - 70 U/L    Protein Total 6.8 6.8 - 8.8 g/dL    Albumin 3.4 3.4 - 5.0 g/dL    Bilirubin Total 0.3 0.2 - 1.3 mg/dL    GFR Estimate >90 >60 mL/min/1.73m2   Lipid panel    Collection Time: 04/12/22  6:42 AM   Result Value Ref Range    Cholesterol 157 <200 mg/dL    Triglycerides 160 (H) <150 mg/dL    Direct Measure HDL 29 (L) >=40 mg/dL    LDL Cholesterol Calculated 96 <=100 mg/dL    Non HDL Cholesterol 128 <130 mg/dL " "  TSH with free T4 reflex and/or T3 as indicated    Collection Time: 04/12/22  6:42 AM   Result Value Ref Range    TSH 0.14 (L) 0.40 - 4.00 mU/L   CBC with platelets and differential    Collection Time: 04/12/22  6:42 AM   Result Value Ref Range    WBC Count 7.8 4.0 - 11.0 10e3/uL    RBC Count 5.56 4.40 - 5.90 10e6/uL    Hemoglobin 15.8 13.3 - 17.7 g/dL    Hematocrit 47.7 40.0 - 53.0 %    MCV 86 78 - 100 fL    MCH 28.4 26.5 - 33.0 pg    MCHC 33.1 31.5 - 36.5 g/dL    RDW 13.2 10.0 - 15.0 %    Platelet Count 235 150 - 450 10e3/uL    % Neutrophils 52 %    % Lymphocytes 32 %    % Monocytes 9 %    % Eosinophils 6 %    % Basophils 1 %    % Immature Granulocytes 0 %    NRBCs per 100 WBC 0 <1 /100    Absolute Neutrophils 4.0 1.6 - 8.3 10e3/uL    Absolute Lymphocytes 2.5 0.8 - 5.3 10e3/uL    Absolute Monocytes 0.7 0.0 - 1.3 10e3/uL    Absolute Eosinophils 0.5 0.0 - 0.7 10e3/uL    Absolute Basophils 0.1 0.0 - 0.2 10e3/uL    Absolute Immature Granulocytes 0.0 <=0.4 10e3/uL    Absolute NRBCs 0.0 10e3/uL   T4 free    Collection Time: 04/12/22  6:42 AM   Result Value Ref Range    Free T4 1.12 0.76 - 1.46 ng/dL       /78   Pulse 85   Temp 97.7  F (36.5  C) (Oral)   Resp 16   Ht 1.702 m (5' 7\")   Wt 91.9 kg (202 lb 8 oz)   SpO2 96%   BMI 31.72 kg/m    Weight is 202 lbs 8 oz  Body mass index is 31.72 kg/m .         Psychiatric Examination:   Appearance:  awake, fatigued  Attitude:  guarded and uncooperative  Eye Contact:  poor , looking around room  Mood:  anxious, depressed and \"irritable\"  Affect:  intensity is blunted and guarded  Speech:  clear, coherent and minimal sponteneous responses  Psychomotor Behavior:  no evidence of tardive dyskinesia, dystonia, or tics  Thought Process:  linear and goal oriented  Associations:  no loose associations  Thought Content:  no evidence of suicidal ideation or homicidal ideation, no auditory hallucinations present and no visual hallucinations present  Insight:  " "limited  Judgment:  fair  Oriented to:  time, person, and place  Attention Span and Concentration:  limited  Recent and Remote Memory:  fair  Language: Fluent in English  Fund of Knowledge: appropriate  Muscle Strength and Tone: normal  Gait and Station: Normal    /78   Pulse 85   Temp 97.7  F (36.5  C) (Oral)   Resp 16   Ht 1.702 m (5' 7\")   Wt 92.4 kg (203 lb 9.6 oz)   SpO2 96%   BMI 31.89 kg/m           Physical Exam:     Please refer to the physical Exam completed in the ED by Dr Charles Martinez MD on 4/10/22.     Physical Exam  Constitutional:  Oriented to person, place, and time. Blunted affect, anxious. Head is atrauamtic  HENT:   Head: Normocephalic.   Mouth/Throat: Oropharynx is clear and moist.   Eyes:EOM are normal. Pupils are equal, round, and reactive to light.   Neck: Neck supple.   Cardiovascular: Normal rate, regular rhythm and normal heart sounds.                                Exam reveals no gallop and no friction rub.                                 No murmur heard.  Pulmonary/Chest: Effort normal and breath sounds normal.                                   No respiratory distress. No wheezes. No rales.                                   No reproducible chest wall pain.  Abdominal: Soft. No distension. No tenderness. No rebound and no guarding.   Musculoskeletal: Normal range of motion.   Neurological: Alert and oriented to person, place, and time. Pressured speech, oriented x3. CN intact  Moves all 4 extremities spontaneously    Skin: No rash noted. No pallor.    Pscyh: Pressured speech, flight of ideas, anxious, denies SI or hallucinations.   "

## 2022-04-12 NOTE — PLAN OF CARE
RN Assessment:    Pt presented with irritable affect. Pt refused to have vital signs assessed. Pt refused a formal check in. Pt made no statements regarding SI, SIB, HI, or hallucinations this shift. Pt denies having physical pain. Pt made no statements endorsing medical concerns. Pt declined the medication ordered at the AM medication pass. Pt was isolative and withdrawn this shift. Pt only came out of pt room for meals. Continue to monitor for safety and changes in medical condition.     Writer could not complete the admission assessment, as pt refused to participate in assessment.

## 2022-04-12 NOTE — PLAN OF CARE
Tasks Complete:    CTC attempted to met with pt. Pt appeared resting. CTC will meet with pt tomorrow to discuss CD treatment.     Post round meeting with team @9:30 am updates: Pt admits yesterday evening. PT appears irritable and unwilling to engage with team members. PT will likely need CD treatment.    Current Symptoms include the following:  Per RN note, Pt presented with irritable affect. Pt refused to have vital signs assessed. Pt refused a formal check in. Pt made no statements regarding SI, SIB, HI, or hallucinations this shift.    Addressed patient needs/concerns: Pt did not express needs/ concerns at this time     Discharge Plan or Goal   Plan  Likely discharge to CD treatment or [facility]D IRTS     Care Team   Dr. Jessica Amezquita at [facility]     Barriers to Discharge   Ongoing stabilization      Referral Status  No referrals need at this time      Legal Status  Voluntary
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
## 2022-04-12 NOTE — PLAN OF CARE
04/12/22 1542   Individualization/Patient Specific Goals   Patient Personal Strengths self-reliant;resourceful   Patient Vulnerabilities family/relationship conflict;history of unsuccessful treatment;housing insecurity;lacks insight into illness;legal concerns;poor impulse control;limited support system;substance abuse/addiction   Interprofessional Rounds   Summary Team discused new patient intake. Pt appeared irritable and willing to engage with team members   Participants advanced practice nurse;CTC;nursing   Team Discussion   Participants Provider: Vero Mauro NP, CTC: Catina RODRIGUEZ [facility], and nurse: SUSI Lemus   Progress Pt [facility] to [facility] yesterday evening and requires ongoing stabilization   Anticipated length of stay 2-4 weeks   Continued Stay Criteria/Rationale ongoing stabilization   Medical/Physical no acute medical/ physical needs are being addressed at this time   Precautions PRECAUTIONS AND SAFETY    Behavioral Orders   Procedures     Assault precautions     Code 1 - Restrict to Unit     Elopement precautions     Fall precautions     Routine Programming     As clinically indicated     Status 15     Every 15 minutes.     Suicide precautions     Patients on Suicide Precautions should have a Combination Diet ordered that includes a Diet selection(s) AND a Behavioral Tray selection for Safe Tray - with utensils, or Safe Tray - NO utensils       Withdrawal precautions       Safety  Safety WDL: WDL  Patient Location: patient room, own, staff office  Observed Behavior: calm, sitting, walking  Safety Measures: suicide assessment completed, safety plan reviewed, environmental rounds completed  Suicidality: Status 15, Minimal furniture in room  Withdrawal: monitor withdrawal process  Assault: status 15  Elopement: status 15, signs posted on unit entrance / exit doors          Plan Likely discharge to CD treatment or IRTS   Rationale for change in precautions or plan No change in plan   Anticipated  Discharge Disposition substance use treatment;IRTS

## 2022-04-12 NOTE — PLAN OF CARE
Heart rate checked due to high heart rate upon admission. Pt's heart rate was 85 with Oxygen saturation of 96 percent per monitor. No symptoms reported from patient when asked by this writer.  Pt rolled over and appeared to be sleeping as writer left room.

## 2022-04-12 NOTE — PROGRESS NOTES
04/11/22 2229   Patient Belongings   Did you bring any home meds/supplements to the hospital?  No   Patient Belongings locker   Patient Belongings Put in Hospital Secure Location (Security or Locker, etc.) body jewelry;clothing   Belongings Search Yes   Clothing Search Yes   Second Staff Afshin PETERS     Patient locker-Sweatpants, shirt, chain.     Security-No items in security.  A               Admission:  I am responsible for any personal items that are not sent to the safe or pharmacy.  [facility] is not responsible for loss, theft or damage of any property in my possession.    Signature:  _________________________________ Date: _______  Time: _____                                              Staff Signature:  ____________________________ Date: ________  Time: _____      2nd Staff person, if patient is unable/unwilling to sign:    Signature: ________________________________ Date: ________  Time: _____     Discharge:  [facility] has returned all of my personal belongings:    Signature: _________________________________ Date: ________  Time: _____                                          Staff Signature:  ____________________________ Date: ________  Time: _____

## 2022-04-12 NOTE — PROGRESS NOTES
OT NON-ATTEND NOTE:    Patient has not attended OT Groups. Will be given a self assessment form. OT staff will explain there value of OT, including them in their tx plan and offer options for meeting their needs and identifying goals.

## 2022-04-12 NOTE — PHARMACY-ADMISSION MEDICATION HISTORY
Admission medication history completed at [facility]. Please see Pharmacy - Admission Medication History note from 4/10/2022.    Florinda Villalobos, PharmD, BCPS

## 2022-04-12 NOTE — PLAN OF CARE
"Problem: Suicide Risk  Goal: Absence of Self-Harm  Outcome: Initial Admissions Assessment--Not Progressing     Pt is admitted to the unit from [facility] ED on a 72hh. ED RN reports that the pt has SI with a plan to jump off of a bridge. Reports that his UTOX was positive for amphetamine, benzodiazapine, and cannabinoids.  Reports that he has been sleeping and eating in the ED, and has been cooperative, and wants treatment. RN reports that pt received 10 mg of Zyprexa at approximately 1830 this evening for c/o being anxious and jittery.    Pt answered a few admission intake questions, reports feeling \"sleepy and needing to go to bed.\" Informs this writer that he cannot finish the interview due to need to sleep. Speech tone is irritable. Pt reports that he has SI with a plan to jump off a bridge if he was not in the hospital. Reports that he wants treatment, noting that he experiences anxiety. Reports that he is able to contract for safety at this time, verbalizes agreement to notify staff if he feels unsafe or begins to develop a plan for self-harm or suicide. Pt's HR is elevated; pt denies chest pain/discomfort, SOB, lightheadedness, and dizziness. Reports has a \"cut\" on his R toe; EMT reports that it's \"closed.\" Current VS reviewed with NOC charge RN for monitoring.    Per [facility] ED Provider Notes, 4/10/22:  Anastasiia Maldonado MD: \"...he tells me know that he does not know how he came to be here. He says he thinks he was hallucinating last night...He acknowledges using methamphetamine. Patient tells me he is homeless and does not have any issues. He says that if released from here he would jump off a bridge in order to kill himself. He thinks he need to be admitted to a psychiatric unit.\"  Juan Soto MD: \"[Pt] is a 31 year old male with a history of methamphetamine abuse with associated psychosis who presents with mental health problem. EMS reports that patient woke up in the walk-in cooler of a [employer] " "station where he works in [place]. On arrival Terrance believed that he was being chased. He then fled and was eventually contacted by EMS who transported him here. He does not know how he came to [place] from [place]. Pt states he is unsure if he used any substances tonight....\"       "

## 2022-04-13 PROCEDURE — 99232 SBSQ HOSP IP/OBS MODERATE 35: CPT

## 2022-04-13 PROCEDURE — 250N000013 HC RX MED GY IP 250 OP 250 PS 637: Performed by: PSYCHIATRY & NEUROLOGY

## 2022-04-13 PROCEDURE — 124N000002 HC R&B MH UMMC

## 2022-04-13 PROCEDURE — 250N000013 HC RX MED GY IP 250 OP 250 PS 637

## 2022-04-13 RX ORDER — POLYETHYLENE GLYCOL 3350 17 G
2-4 POWDER IN PACKET (EA) ORAL
Status: DISCONTINUED | OUTPATIENT
Start: 2022-04-13 | End: 2022-04-25 | Stop reason: HOSPADM

## 2022-04-13 RX ORDER — HYDROXYZINE HYDROCHLORIDE 25 MG/1
25 TABLET, FILM COATED ORAL 4 TIMES DAILY PRN
Status: DISCONTINUED | OUTPATIENT
Start: 2022-04-13 | End: 2022-04-14

## 2022-04-13 RX ORDER — ESCITALOPRAM OXALATE 20 MG/1
20 TABLET ORAL DAILY
Status: DISCONTINUED | OUTPATIENT
Start: 2022-04-13 | End: 2022-04-25 | Stop reason: HOSPADM

## 2022-04-13 RX ORDER — DOCOSANOL 100 MG/G
CREAM TOPICAL
Status: DISCONTINUED | OUTPATIENT
Start: 2022-04-13 | End: 2022-04-18 | Stop reason: CLARIF

## 2022-04-13 RX ADMIN — DOCOSANOL: 100 CREAM TOPICAL at 17:24

## 2022-04-13 RX ADMIN — RISPERIDONE 2 MG: 2 TABLET ORAL at 21:27

## 2022-04-13 RX ADMIN — DOCOSANOL: 100 CREAM TOPICAL at 21:27

## 2022-04-13 RX ADMIN — HYDROXYZINE HYDROCHLORIDE 25 MG: 25 TABLET, FILM COATED ORAL at 17:30

## 2022-04-13 RX ADMIN — ESCITALOPRAM OXALATE 20 MG: 20 TABLET ORAL at 09:35

## 2022-04-13 RX ADMIN — DOCOSANOL: 100 CREAM TOPICAL at 14:03

## 2022-04-13 RX ADMIN — NICOTINE POLACRILEX 2 MG: 2 LOZENGE ORAL at 17:30

## 2022-04-13 ASSESSMENT — ACTIVITIES OF DAILY LIVING (ADL)
CHANGE_IN_FUNCTIONAL_STATUS_SINCE_ONSET_OF_CURRENT_ILLNESS/INJURY: NO
VISION_MANAGEMENT: GLASSES
DIFFICULTY_EATING/SWALLOWING: NO
DRESS: SCRUBS (BEHAVIORAL HEALTH)
TOILETING_ISSUES: NO
DRESSING/BATHING_DIFFICULTY: NO
WEAR_GLASSES_OR_BLIND: YES
NUMBER_OF_TIMES_PATIENT_HAS_FALLEN_WITHIN_LAST_SIX_MONTHS: 1
ORAL_HYGIENE: INDEPENDENT
FALL_HISTORY_WITHIN_LAST_SIX_MONTHS: YES
DRESS: SCRUBS (BEHAVIORAL HEALTH)
HYGIENE/GROOMING: INDEPENDENT
DOING_ERRANDS_INDEPENDENTLY_DIFFICULTY: NO
ORAL_HYGIENE: INDEPENDENT
LAUNDRY: WITH SUPERVISION
TOILETING_ASSISTANCE: TOILETING DIFFICULTY, DEPENDENT
HYGIENE/GROOMING: INDEPENDENT
CONCENTRATING,_REMEMBERING_OR_MAKING_DECISIONS_DIFFICULTY: OTHER (SEE COMMENTS)
WALKING_OR_CLIMBING_STAIRS_DIFFICULTY: NO
LAUNDRY: WITH SUPERVISION

## 2022-04-13 NOTE — PLAN OF CARE
Patient stayed in his room majority of the shift. Only came out of his room during med pass and supper. Appeared tense but was cooperative with the check in. Noted to be irritable at times. He was irritated when his wrist band did not work when it was first scanned. Rated anxiety 8/10 and was given PRN Gabapentin per request which helped. Rated depression 5/10. Denies SI, HI, SIB and hallucinations. Contracted for safety. Denies having any withdrawal symptoms. Denies pain. Compliant with meds. Declined to attend group. Continues to be on assault, fall, suicide, elopement and withdrawal precautions.

## 2022-04-13 NOTE — PLAN OF CARE
Tasks Complete:      CTC attempted to meet with pt. PT was asleep for majority of the shift. CTC will attempt to meet with pt tomorrow.       Post round meeting with team @9:30 am updates: Pt appears irritable and unwilling to meet with team. Pt will likely need CD treatment    Current Symptoms include the following: Per RN note, Flat affect, denies SI/SIB depression/anxiety.  Patient told staff during 1:1 discussion he's tired and would like to rest.    Addressed patient needs/concerns: Pt did not note any concerns at this time     Discharge Plan or Goal    Plan  Likely discharge to CD treatment or [facility] IRTS      Care Team   Dr. Jessica Amezquita at [facility]     Barriers to Discharge   Ongoing stabilization      Referral Status  No referrals need at this time      Legal Status  Voluntary

## 2022-04-13 NOTE — PLAN OF CARE
Goal Outcome Evaluation:    Plan of Care Reviewed With: patient   Patient stayed in room through the shift sleeping.  Patient was up for breakfast and lunch.  Flat affect, denies SI/SIB depression/anxiety.  Patient told staff during 1:1 discussion he's tired and would like to rest.  Patient resting comfortably at this time.  Will continue to monitor closely.

## 2022-04-13 NOTE — PROGRESS NOTES
"[facility],  Psychiatric Progress Note      Impression:     Terrance Fletcher is a 31 year old male with history of methamphetamine abuse with associated psychosis who presents with mental health problem. EMS reported that patient woke up in the walk-in cooler of a [employer] where he works in [place]. On arrival Terrance believed that he was being chased. He then fled and was eventually contacted by EMS who transported him here. He dose not know how he came to [place] from [place]. Patient stated he was unsure if he used any substances. He kept glancing to the corner of the room and did not make make consistent eye contact but denied hallucinations during triage. Patient reported relapsing from Meth use and has a long history of substance abuse and CD treatments. He was recently discharged into a treatment facility and relapsed after discharging and going to a friends house. He currently denies suicidal ideations, denied visual or auditory hallucinations. He reports feeling \"irritable\" and endorses symptoms of depression and anxiety rating depression at \"6/10\" and anxiety at \"8/10\".  He reports a time frame of three months for symptoms with no coping skills, drug use and  homelessness as a exacerbating factors.          Diagnoses:     Psychosis Unspecified Psychosis type (H)  Substance-induced psychosis (methamphetamine, cannabis)  Likely component of malingering  History of major depressive disorder  Rule out antisocial personality disorder  Methamphetamine use disorder  Cannabis use disorder  Nicotine use disorder  Psoriasis         Plan:     Medications:  Stat taking Escitalopram (Lexapro) 20 mg daily for depression  Start taking PRN Hydroxyzine (Atarax) as needed for anxiety  Continue taking Risperidone 2 mg at bedtime  Continue taking available PRN medications.    Medical:  Reviewed admission labs. Positive for Meth, Benzos and Cannabis. All other labs WNL      " "Behavioral/psychology/social:   Encouraged patient to attend therapeutic hospital programming as tolerated.     Disposition:   Reason for continued hospitalization: Patient is at risk for harming self from drug induces psychosis.     Stabilization with medications, provide structured and supportive environment, groups.     Discharge plan: Patient will tentatively discharge to  treatment.    Discharge Disposition: greater than 5 days    Attestation:  Patient has been seen and evaluated by me,  ELVIRA CRUZ CNP          Interim History:   The patient's care was discussed with the treatment team and chart notes were reviewed. Records and staff report indicate 7.5 hours of sleep. Medication compliance and isolation to room.     Patient was agreeable to interview in the office but continues to be irritable and non engaging. He responded sporadically to questions and was fidgety and restless. Stated he is sleeping and eating well. Denied suicdal ideations and hallucinations. Denied having side effects to medications. Maintained poor eye contact and sat in the chair with eyes closed occassionally blurting out \"cold sore medicine\" pointing to his mouth. He was agreeable to restarting Lexapro and declined offer of Wellbutrin stating he had taken it in the past and did not like it. Would not elaborate on \"didn't like it\". Pharmacy records indicate Lexapro is the only medication he had adherence to. Reported efficacy with PRN Hydroxyzine in the past. Patient did not want to engage further and left the interview room. Needs continued stabilization.     No change in medical status    The Review of Systems is negative other than noted in the HPI         Medications:     Current Facility-Administered Medications   Medication     acetaminophen (TYLENOL) tablet 650 mg     alum & mag hydroxide-simethicone (MAALOX) suspension 30 mL     [Held by provider] escitalopram (LEXAPRO) tablet 20 mg     gabapentin (NEURONTIN) capsule " "100 mg     nicotine (NICODERM CQ) 21 MG/24HR 24 hr patch 1 patch     nicotine Patch in Place     nicotine polacrilex (NICORETTE) gum 4 mg     OLANZapine (zyPREXA) tablet 10 mg    Or     OLANZapine (zyPREXA) injection 10 mg     risperiDONE (risperDAL) tablet 2 mg     traZODone (DESYREL) tablet 50 mg             Allergies:   No Known Allergies         Psychiatric Examination:   /79 (BP Location: Left arm, Patient Position: Sitting, Cuff Size: Adult Large)   Pulse 111   Temp 98  F (36.7  C) (Temporal)   Resp 16   Ht 1.702 m (5' 7\")   Wt 92.4 kg (203 lb 9.6 oz)   SpO2 95%   BMI 31.89 kg/m    Weight is 203 lbs 9.6 oz  Body mass index is 31.89 kg/m .    Appearance:  awake, alert, adequately groomed and dressed in hospital scrubs  Attitude:  guarded  Eye Contact:  fair  Mood:  depressed  Affect:  appropriate and in normal range  Speech:  clear, coherent and normal prosody  Psychomotor Behavior:  no evidence of tardive dyskinesia, dystonia, or tics  Thought Process:  logical  Associations:  no loose associations  Thought Content:  no evidence of suicidal ideation or homicidal ideation, no auditory hallucinations present and no visual hallucinations present  Insight:  fair  Judgment:  fair  Oriented to:  time, person, and place  Attention Span and Concentration:  fair  Recent and Remote Memory:  fair  Language: Speaks fluent English  Fund of Knowledge: appropriate  Muscle Strength and Tone: normal  Gait and Station: Normal         Labs:   No results found for this or any previous visit (from the past 24 hour(s)).  "

## 2022-04-13 NOTE — PLAN OF CARE
"Problem: Mood Impairment (Anxiety Signs/Symptoms)  Goal: Improved Mood Symptoms (Anxiety Signs/Symptoms)  Outcome: Ongoing, Not Progressing     Pt is observed sitting in the lounge, dining area, and in his room during the first part of the shift; spends the majority of the second part of the shift in his room resting or sleeping. Reports has spent the day primarily in his room, resting or napping, noting that he has been feeling \"edgy\" and \"having racing thoughts.\" The thoughts consist of \"not being able to get a hold of my mother;\" and \"what I'm going to do about all the things I don't have.\" Reports that he left clothing and his drivers' license, and other items with someone \"that I'm no longer on good terms with,\" and isn't sure how he can retrieve these, if at all. Reports presence of anxiety, rating its severity as \"8-9\"/10 and depression as \"5-6\"/10. Denies SI/SIB, HI, hallucinations, and pain. Provided with PRN hydroxyzine for anxiety management. Pt requests nicotine lozenges, stating, \"I don't use the gum.\" Also states, \"I don't know why people keep asking me about a nicotine patch; I don't use the patch either.\" Contacted the on-call provider who orders nicotine lozenges for the pt and discontinues the orders for the patch and gum. The pt's affect is generally restricted with some irritability observed; mood is anxious; eye contact is direct. The pt is compliant with medication administration.  "

## 2022-04-13 NOTE — PLAN OF CARE
Pt appeared to sleep 7.5 hours. No concerns reported. Safety checks done at least every 15 minutes.

## 2022-04-14 PROCEDURE — 99232 SBSQ HOSP IP/OBS MODERATE 35: CPT

## 2022-04-14 PROCEDURE — 250N000013 HC RX MED GY IP 250 OP 250 PS 637: Performed by: PSYCHIATRY & NEUROLOGY

## 2022-04-14 PROCEDURE — 124N000002 HC R&B MH UMMC

## 2022-04-14 PROCEDURE — 250N000013 HC RX MED GY IP 250 OP 250 PS 637

## 2022-04-14 PROCEDURE — H2032 ACTIVITY THERAPY, PER 15 MIN: HCPCS

## 2022-04-14 RX ORDER — GABAPENTIN 100 MG/1
100 CAPSULE ORAL 3 TIMES DAILY PRN
Status: DISCONTINUED | OUTPATIENT
Start: 2022-04-14 | End: 2022-04-25 | Stop reason: HOSPADM

## 2022-04-14 RX ORDER — LANOLIN ALCOHOL/MO/W.PET/CERES
3 CREAM (GRAM) TOPICAL
Status: DISCONTINUED | OUTPATIENT
Start: 2022-04-14 | End: 2022-04-25 | Stop reason: HOSPADM

## 2022-04-14 RX ADMIN — NICOTINE POLACRILEX 4 MG: 2 LOZENGE ORAL at 16:02

## 2022-04-14 RX ADMIN — DOCOSANOL: 100 CREAM TOPICAL at 21:12

## 2022-04-14 RX ADMIN — NICOTINE POLACRILEX 4 MG: 2 LOZENGE ORAL at 18:04

## 2022-04-14 RX ADMIN — MELATONIN TAB 3 MG 3 MG: 3 TAB at 21:50

## 2022-04-14 RX ADMIN — DOCOSANOL: 100 CREAM TOPICAL at 17:16

## 2022-04-14 RX ADMIN — RISPERIDONE 2 MG: 2 TABLET ORAL at 21:10

## 2022-04-14 RX ADMIN — GABAPENTIN 100 MG: 100 CAPSULE ORAL at 20:01

## 2022-04-14 RX ADMIN — DOCOSANOL 10 G: 100 CREAM TOPICAL at 08:17

## 2022-04-14 RX ADMIN — ESCITALOPRAM OXALATE 20 MG: 20 TABLET ORAL at 08:17

## 2022-04-14 ASSESSMENT — ACTIVITIES OF DAILY LIVING (ADL)
ORAL_HYGIENE: INDEPENDENT
DRESS: SCRUBS (BEHAVIORAL HEALTH)
HYGIENE/GROOMING: INDEPENDENT
LAUNDRY: WITH SUPERVISION

## 2022-04-14 NOTE — PLAN OF CARE
"Problem: Suicidal Behavior  Goal: Suicidal Behavior is Absent or Managed  Outcome: Ongoing, Progressing     Pt is observed briefly sitting in the lounge watching tv during the first and second parts of the shift; spends the majority of the shift in his room. Reports that he spent the majority of day shift in his bed sleeping, noting that sleep is a way for him to not have to worry about things. Further notes that he uses sleep as a coping mechanism to not have to think about difficult things when he's not in the hospital. Reports that he plans to be awake and more present in the milieu during this shift, including considering attending group this evening, which he did. The pt reports that his anxiety level is \"4.5\"/10 during the beginning of the shift; doesn't think he needs PRN medication intervention. Denies presence of depression, SI/SIB, HI, hallucinations, and pain. Affect is restricted; mood is calm; eye contact alternates between being direct and indirect; speech is of normal speed and tone. Pt reports that his R toe \"looks black.\" Toe is observed by this writer; appears to be healing; laceration is closed with no discharge; appears to have some ecchymosis. Pt denies pain or discomfort in this toe, and it does not appear to negatively impact his mobility. Instructed him to share this with his provider tomorrow; pt verbalizes agreement to do so. During the second part of the shift the pt reports that his anxiety level increased to \"7-8\"/10; requests and is provided with PRN gabapentin. Pt requests assistance with sleep at  medication pass; reports that he cannot take the PRN trazodone because it gives him restless legs. Requests use of melatonin instead. Contacted the on-call provider who orders melatonin 3 mg PRN at bedtime; the pt is provided with this medication. Is compliant with medication administration.  "

## 2022-04-14 NOTE — PLAN OF CARE
Goal Outcome Evaluation:        Pt. With  encouragement willing to engage with staff for a 1:1.  Pt. States that he is tired, that it takes him about 2 weeks to recover after using meth.  The pt. States that he spends time with friends who use meth and then he begins to use again.  The pt. Reports that he has been to treatment about 14 times, he cannot state what he needs to aid in staying sober.  The pt. States that he does what to be discharged to a  treatment center.  Currently the pt. Denies suicidal ideation, self-injurious behavior, auditory or visual hallucinations.  The pt. Reports his emotional status is fair;  again his main complaint is that he is tired.  The pt. Is not attending groups, stating he just wants to sleep.  The pt.'s affect is flat and blunted.  The pt. Will be encouraged to attend groups, engage with activities on the unit.  The pt. Has no questions or requests at this time.

## 2022-04-14 NOTE — PROGRESS NOTES
Pt on status 15 min checks. Appears to be sleeping the whole night. No issues noted. Will continue to monitor.

## 2022-04-14 NOTE — PLAN OF CARE
Appointments:   Pending CD evaluation     Tasks Complete:    CTC met with pt to discuss resources and treatment. PT agreed to establishing care with psychiatry and therapy. Pt would like to discharge to CD treatment . PT reports that he has racing thoughts when he sleeps and feels his heart racing during intervention. Nurse was notified for assessment and med management     Post round meeting with team @9:30 am updates: PT agrees to CD treatment with OP services      Current Symptoms include the following: PT appeared flat and cooperative. PT is medication compliant. He reports of racing thoughts when he sleeps and heart racing during intervention. Nurse was notified      Addressed patient needs/concerns: Pt did not note any concerns at this time      Discharge Plan or Goal    Plan  Likely discharge to CD treatment with OP services     Care Team   Dr. Jessica Amezquita at [facility]     Barriers to Discharge   Ongoing stabilization      Referral Status  PT will need CD, psych and therapy referrals      Legal Status  Voluntary

## 2022-04-14 NOTE — PROGRESS NOTES
"[facility],  Psychiatric Progress Note      Impression:     Terrance Fletcher is a 31 year old male with history of methamphetamine abuse with associated psychosis who presents with mental health problem. EMS reported that patient woke up in the walk-in cooler of a holiday [employer] station where he works in [place]. On arrival Terrance believed that he was being chased. He then fled and was eventually contacted by EMS who transported him here. He dose not know how he came to [place] from [place]. Patient stated he was unsure if he used any substances. He kept glancing to the corner of the room and did not make make consistent eye contact but denied hallucinations during triage. Patient reported relapsing from Meth use and has a long history of substance abuse and CD treatments. He was recently discharged into a treatment facility and relapsed after discharging and going to a friends house. He currently denies suicidal ideations, denied visual or auditory hallucinations. He reports feeling \"irritable\" and endorses symptoms of depression and anxiety rating depression at \"6/10\" and anxiety at \"8/10\".  He reports a time frame of three months for symptoms with no coping skills, drug use and  homelessness as a exacerbating factors.          Diagnoses:     Psychosis Unspecified Psychosis type (H)  Substance-induced psychosis (methamphetamine, cannabis)  Likely component of malingering  History of major depressive disorder  Rule out antisocial personality disorder  Methamphetamine use disorder  Cannabis use disorder  Nicotine use disorder  Psoriasis         Plan:     Medications:  Stat taking Escitalopram (Lexapro) 20 mg daily for depression  Start taking PRN Gabapentin as needed for anxiety  Continue taking Risperidone 2 mg at bedtime  Continue taking available PRN medications.     Medical:  Reviewed admission labs. Positive for Meth, Benzos and Cannabis. All other labs WNL " "      Behavioral/psychology/social:   Encouraged patient to attend therapeutic hospital programming as tolerated.      Disposition:   Reason for continued hospitalization: Patient is at risk for harming self from drug induces psychosis.      Stabilization with medications, provide structured and supportive environment, groups.      Discharge plan: Patient will tentatively discharge to  treatment.     Discharge Disposition: greater than 5 days     Attestation:  Patient has been seen and evaluated by me,  ELVIRA CRUZ CNP          Interim History:   The patient's care was discussed with the treatment team and chart notes were reviewed. Records indicate he slept all night. He is compliant with medications without any concerns. Reports also indicate isolation in room.      Patient was agreeable to interview and was interviewed in his room. He was in a better mood and apologized for the previous days. He maintained good eye contact with conversation. Expressed continued symptoms of anxiety \"racing thoughts\". He denied having side effects to medications and requested Gabapentin PRN instead of Hydroxyzine. Stated \"at least I'm not dead\" referring to his struggles with drug use. Expressed readiness to go into treatment. Continues to use PRN medications for anxiety and mouth cold sore. Needs further stabilization. No additional concerns.     No change in medical status    The Review of Systems is negative other than noted in the HPI         Medications:     Current Facility-Administered Medications   Medication     acetaminophen (TYLENOL) tablet 650 mg     alum & mag hydroxide-simethicone (MAALOX) suspension 30 mL     docosanol (ABREVA) 10 % cream     escitalopram (LEXAPRO) tablet 20 mg     hydrOXYzine (ATARAX) tablet 25 mg     nicotine (COMMIT) lozenge 2-4 mg     OLANZapine (zyPREXA) tablet 10 mg    Or     OLANZapine (zyPREXA) injection 10 mg     risperiDONE (risperDAL) tablet 2 mg     traZODone (DESYREL) tablet 50 " "mg             Allergies:   No Known Allergies         Psychiatric Examination:   /80 (BP Location: Right arm)   Pulse 92   Temp 97.4  F (36.3  C) (Oral)   Resp 16   Ht 1.702 m (5' 7\")   Wt 92.4 kg (203 lb 9.6 oz)   SpO2 95%   BMI 31.89 kg/m    Weight is 203 lbs 9.6 oz  Body mass index is 31.89 kg/m .    Appearance:  awake, alert, adequately groomed and dressed in hospital scrubs  Attitude:  cooperative  Eye Contact:  good  Mood:  better  Affect:  appropriate and in normal range  Speech:  clear, coherent  Psychomotor Behavior:  no evidence of tardive dyskinesia, dystonia, or tics  Thought Process:  logical and goal oriented  Associations:  no loose associations  Thought Content:  no evidence of suicidal ideation or homicidal ideation, no auditory hallucinations present and no visual hallucinations present  Insight:  fair  Judgment:  fair  Oriented to:  time, person, and place  Attention Span and Concentration:  intact  Recent and Remote Memory:  intact  Language: Able to read and write  Fund of Knowledge: appropriate  Muscle Strength and Tone: normal  Gait and Station: Normal         Labs:   No results found for this or any previous visit (from the past 24 hour(s)).  "

## 2022-04-15 PROCEDURE — H0001 ALCOHOL AND/OR DRUG ASSESS: HCPCS

## 2022-04-15 PROCEDURE — 250N000013 HC RX MED GY IP 250 OP 250 PS 637: Performed by: PSYCHIATRY & NEUROLOGY

## 2022-04-15 PROCEDURE — 99232 SBSQ HOSP IP/OBS MODERATE 35: CPT

## 2022-04-15 PROCEDURE — 124N000002 HC R&B MH UMMC

## 2022-04-15 PROCEDURE — 250N000013 HC RX MED GY IP 250 OP 250 PS 637

## 2022-04-15 RX ORDER — RISPERIDONE 1 MG/1
1 TABLET ORAL DAILY
Status: DISCONTINUED | OUTPATIENT
Start: 2022-04-15 | End: 2022-04-25 | Stop reason: HOSPADM

## 2022-04-15 RX ADMIN — DOCOSANOL: 100 CREAM TOPICAL at 08:30

## 2022-04-15 RX ADMIN — RISPERIDONE 1 MG: 1 TABLET ORAL at 10:52

## 2022-04-15 RX ADMIN — ESCITALOPRAM OXALATE 20 MG: 20 TABLET ORAL at 08:27

## 2022-04-15 RX ADMIN — NICOTINE POLACRILEX 4 MG: 2 LOZENGE ORAL at 13:03

## 2022-04-15 RX ADMIN — DOCOSANOL: 100 CREAM TOPICAL at 18:02

## 2022-04-15 RX ADMIN — MELATONIN TAB 3 MG 3 MG: 3 TAB at 21:25

## 2022-04-15 RX ADMIN — RISPERIDONE 2 MG: 2 TABLET ORAL at 21:22

## 2022-04-15 RX ADMIN — NICOTINE POLACRILEX 4 MG: 2 LOZENGE ORAL at 18:03

## 2022-04-15 RX ADMIN — DOCOSANOL: 100 CREAM TOPICAL at 10:55

## 2022-04-15 RX ADMIN — DOCOSANOL: 100 CREAM TOPICAL at 21:22

## 2022-04-15 NOTE — PROGRESS NOTES
"[facility],  Psychiatric Progress Note      Impression:       Terrance Fletcher is a 31 year old male with history of methamphetamine abuse with associated psychosis who presents with mental health problem. EMS reported that patient woke up in the walk-in cooler of a holiday [employer] station where he works in [place]. On arrival Terrance believed that he was being chased. He then fled and was eventually contacted by EMS who transported him here. He dose not know how he came to [place] from [place]. Patient stated he was unsure if he used any substances. He kept glancing to the corner of the room and did not make make consistent eye contact but denied hallucinations during triage. Patient reported relapsing from Meth use and has a long history of substance abuse and CD treatments. He was recently discharged into a treatment facility and relapsed after discharging and going to a friends house. He currently denies suicidal ideations, denied visual or auditory hallucinations. He reports feeling \"irritable\" and endorses symptoms of depression and anxiety rating depression at \"6/10\" and anxiety at \"8/10\".  He reports a time frame of three months for symptoms with no coping skills, drug use and  homelessness as a exacerbating factors.         DIagnoses:     Psychosis Unspecified Psychosis type (H)  Substance-induced psychosis (methamphetamine, cannabis)  Likely component of malingering  History of major depressive disorder  Rule out antisocial personality disorder  Methamphetamine use disorder  Cannabis use disorder  Nicotine use disorder  Psoriasis         Plan:     Medications:  Start taking Risperidone 1 mg in the morning  Continue taking Escitalopram (Lexapro) 20 mg daily for depression  Continue taking PRN Gabapentin as needed for anxiety  Continue taking Risperidone 2 mg at bedtime  Continue taking available PRN medications.     Medical:  Reviewed admission labs. Positive for Meth, " "Benzos and Cannabis. All other labs WNL       Behavioral/psychology/social:   Encouraged patient to attend therapeutic hospital programming as tolerated.      Disposition:   Reason for continued hospitalization: Patient is at risk for harming self from drug induces psychosis.      Stabilization with medications, provide structured and supportive environment, groups.      Discharge plan: Patient will tentatively discharge to  treatment.     Discharge Disposition: greater than 5 days     Attestation:  Patient has been seen and evaluated by me,  ELVIRA CRUZ CNP          Interim History:   The patient's care was discussed with the treatment team and chart notes were reviewed. Records indicate he slept 7 hours last night. He continues to be compliant with medication and improved socialization, out in the milieu and attending group.       Patient was agreeable to interview and was interviewed in his room. He reported being in a better mod. He denied having side effects to medications, stated he is sleeping and eating well. Denied suicidal ideations, continues to struggle with anxiety and stated the anxiety is worse between noon and bedtime. He stated \"Risperal helps me with the racing thoughts and anxiety\", agreeable to Risperdal twice a day from once at bedtime only.  Rated anxiety at \"5/10\". Encouraged to use PRN Gabapentin. Reports improved depression symptoms and rates his depression at \"3/10\". Needs further stabilization. No additional concerns.    No change in medical status    The Review of Systems is negative other than noted in the HPI         Medications:     Current Facility-Administered Medications   Medication     acetaminophen (TYLENOL) tablet 650 mg     alum & mag hydroxide-simethicone (MAALOX) suspension 30 mL     docosanol (ABREVA) 10 % cream     escitalopram (LEXAPRO) tablet 20 mg     gabapentin (NEURONTIN) capsule 100 mg     melatonin tablet 3 mg     nicotine (COMMIT) lozenge 2-4 mg     " "OLANZapine (zyPREXA) tablet 10 mg    Or     OLANZapine (zyPREXA) injection 10 mg     risperiDONE (risperDAL) tablet 1 mg     risperiDONE (risperDAL) tablet 2 mg     traZODone (DESYREL) tablet 50 mg             Allergies:   No Known Allergies         Psychiatric Examination:   /76   Pulse 87   Temp 97.4  F (36.3  C) (Temporal)   Resp 16   Ht 1.702 m (5' 7\")   Wt 92.4 kg (203 lb 9.6 oz)   SpO2 97%   BMI 31.89 kg/m    Weight is 203 lbs 9.6 oz  Body mass index is 31.89 kg/m .    Appearance:  awake, alert, adequately groomed and dressed in hospital scrubs  Attitude:  cooperative  Eye Contact:  good  Mood:  better  Affect:  appropriate and in normal range  Speech:  clear, coherent  Psychomotor Behavior:  no evidence of tardive dyskinesia, dystonia, or tics  Thought Process:  logical and goal oriented  Associations:  no loose associations  Thought Content:  no evidence of suicidal ideation or homicidal ideation, no auditory hallucinations present and no visual hallucinations present  Insight:  good  Judgment:  fair  Oriented to:  time, person, and place  Attention Span and Concentration:  intact  Recent and Remote Memory:  fair  Language: Able to read and write  Fund of Knowledge: appropriate  Muscle Strength and Tone: normal  Gait and Station: Normal         Labs:   No results found for this or any previous visit (from the past 24 hour(s)).  "

## 2022-04-15 NOTE — CONSULTS
4/15/2022    Pt has been interviewed via video phone and CD Consult has been completed. Email has been sent to Unit SW with CHARLETTE's for pt to sign. Referrals have been sent to treatment facilities.     Recommendations:      1. Abstain from all non-prescribed mood-altering substances  2. Take all medications as prescribed  3. Enter and complete a residential or inpatient treatment program  4. Follow all recommendations upon discharge from treatment. Recommendations may include, but are not limited to: extended treatment, outpatient treatment and/or sober housing.  5. Increase sober support, attend support meetings at least one time weekly        6.   Follow all recommendations of your medical providers        Clinical Substantiation:       Pt meets criteria for Stimulant use disorder-severe. Pt reports cravings, withdrawal, and an inability to control his use. Pt reports he has been in and out of outpatient treatment for the last few months. Pt reports he has been relapsing off and on. Pt reports he would like to attend inpatient CD treatment.      Referrals/ Alternatives:     [facility] - [phone]  Fax - [phone]     [facility] - men s/women s  Phone: [phone]   Fax: [phone]     [facility] Team for Referrals  Phone: [phone]  Fax: [phone]     CLINT consult completed by: AZIZA Quesada  Phone Number: [phone]  E-mail Address: [email or web address] Addiction [email or web address]  [address] [place] [phone]

## 2022-04-15 NOTE — PROGRESS NOTES
04/14/22 2200   Group Therapy Session   Time Session Began 2008   Time Session Ended 2120   Total Time patient participated (minutes) 30   Total # Attendees 4   Group Type expressive therapy   Group Topic Covered balanced lifestyle;coping skills/lifestyle management;relaxation techniques;self-care activities;structured socialization   Group Session Detail Evening Relaxation   Patient Response/Contribution cooperative with task   Patient Response Detail Calm and engaged participation throughout.  Appropriately social with peers.  Engaged well in relaxation intervention to promote sleep.

## 2022-04-15 NOTE — PROGRESS NOTES
Pt appears to be sleeping 7 hrs. Pt is on status 15 min check. No other concerns. Will continue to monitor. Pt on status 15 min checks.

## 2022-04-15 NOTE — PLAN OF CARE
"Patient was withdrawn and isolative this morning. Patient said he was tired. Patient didn't go to groups yet today. Patient denied suicidal thoughts and depression. He endorsed some anxiety. Patient had Risperdal added to a morning dose in addition to evening. Patient was compliant with meds. He has not asked for prn medications today.   Plan is for CD treatment. Patient had an interview with a treatment facility today and he said it went well.  Blood pressure 116/76, pulse 87, temperature 97.4  F (36.3  C), temperature source Temporal, resp. rate 16, height 1.702 m (5' 7\"), weight 92.4 kg (203 lb 9.6 oz), SpO2 97 %.                                                                                                                                    "

## 2022-04-15 NOTE — DISCHARGE INSTRUCTIONS
Behavioral Discharge Planning and Instructions    Summary: You were admitted on 4/11/2022  due to use of substance use and later transported to the ED via EMS. You were treated by Vero Mauro NP and discharged on 04/25/2022  from [facility]  to [facility] treatment located [address] [place], [place] [id number]    Main Diagnosis:   Substance-induced psychosis (methamphetamine, cannabis)  History of major depressive disorder  Methamphetamine use disorder  Cannabis use disorder  Nicotine use disorder  [facility]    Health Care Follow-up: Attend all scheduled appointments with your outpatient providers. Call at least 24 hours in advance if you need to reschedule an appointment to ensure continued access to your outpatient providers.     Psychiatry: [facility] 05/23/2022 1:00PM virtual Dr. Jazzmine Noonan   Address: [address] [place] [address]  Phone: [phone]  [phone]  Dr. Noonan will email you a link to join your meeting on the same day of your appointment. Sometimes the link gets sent to spam mail so please be sure to check your spam mail as well.      Therapy: [facility]/16/2022 10:00 AM virtualHeidi Gibson  Address: [address] [place] [address]  Phone: [phone]  [phone]  Sybil will email you a link to join your meeting on the same day of your appointment. Sometimes the link gets sent to spam mail so please be sure to check your spam mail as well.     Case Management referral: A referral for case management was made. They will contact you regarding services. Feel free to follow up with Adult Mental Health Case Management- [phone]   - We discussed that case management can assist you with applying for section 8, income based housing and Housing Stabilization Services   - Housing Stabilization Services: is a new [place] Medical Assistance benefit to help people with disabilities, including mental illness and  "substance use disorder, and seniors find and keep housing.    Major Treatments, Procedures and Findings:  You were provided with: a psychiatric assessment, assessed for medical stability, medication evaluation and/or management, group therapy, CD evaluation/assessment, milieu management, and medical interventions    Symptoms to Report: feeling more aggressive, increased confusion, losing more sleep, mood getting worse, or thoughts of suicide    Early warning signs can include: increased depression or anxiety sleep disturbances increased thoughts or behaviors of suicide or self-harm  increased unusual thinking, such as paranoia or hearing voices    Safety and Wellness:  Take all medicines as directed.  Make no changes unless your doctor suggests them.      Follow treatment recommendations.  Refrain from alcohol and non-prescribed drugs.  If there is a concern for safety, call 911.    Resources:   Crisis Intervention: 207.153.9510 or 713-630-3357 (TTY: 394.687.9179).  Call anytime for help.  National [place] on Mental Illness (www.mn.sung.org): 198.924.7952 or 341-867-6144.  [place] Association for Children's Mental Health (www.macmh.org): 985.735.4013.  Alcoholics Anonymous (www.alcoholics-anonymous.org): Check your phone book for your local chapter.  Suicide Awareness Voices of Education (SAVE) (www.save.org): 773-362-LHDI (3794)  National Suicide Prevention Line (www.mentalhealthmn.org): 642-557-CMQU (1196)  Mental Health Consumer/Survivor Network of [place] (www.mhcsn.net): 284.242.5300 or 495-700-3329  Mental Health Association of [place] (www.mentalhealth.org): 427.101.3471 or 631-263-2057  Self- Management and Recovery Training., SMART-- Toll free: 191.878.2078  www.[employer]  Text 4 Life: txt \"LIFE\" to 35340 for immediate support and crisis intervention  Crisis Intervention: 714.910.1908 or 303-222-6570. Call anytime for help.   [facility], and [facility]' [facility] Mobile Crisis Response Team " (CRT):  816.960.1176 or 064-275-0650     General Medication Instructions:   See your medication sheet(s) for instructions.   Take all medicines as directed.  Make no changes unless your doctor suggests them.   Go to all your doctor visits.  Be sure to have all your required lab tests. This way, your medicines can be refilled on time.  Do not use any drugs not prescribed by your doctor.  Avoid alcohol.    Advance Directives:   Scanned document on file with [employer]? No scanned doc  Is document scanned? Pt states no documents  Honoring Choices Your Rights Handout: Informed and given  Was more information offered? Materials given    The Treatment team has appreciated the opportunity to work with you. If you have any questions or concerns about your recent admission, you can contact the unit which can receive your call 24 hours a day, 7 days a week. They will be able to get in touch with a Provider if needed. The unit number is [phone].

## 2022-04-15 NOTE — PLAN OF CARE
Tasks Complete:  CTC met with pt to discuss op services. CTC discussed housing stabilization services, case management, therapy, and psychiatry. Pt is seeking an [facility] in regards to therapy. In addition, he agreed to all other services.     PT signed CHARLETTE for psych, therapy, CM, and  mom     PT engaged in CD assessment today . He reports it went well.      CD Treatment ( via  Casie MCCARTHY, [facility]  Chemical Dependency Evaluator) requesting CHARLETTE to continue referral- pt signed and [facility] emailed CHARLETTE's    [facility] called to interview pt   [facility] edie caba,  requesting progress notes. [facility] faxed [phone].    PT accepted to [facility] treatment located [address] [place] [address]. Admission at noon and discharge at 11am from [facility].   Medication: 30 day in hand   Transportation: door to door via [facility] treatment   Will need the following referrals: psych, therapy, CM    Post round meeting with team @9:30 am updates: Team discussed discharge planning. PT will discharge to treatment with OP supports     Current Symptoms include the following: PT appears pleasant and cooperative. He is medication compliant and is observed sleeping in his room for majority of the day today.     Addressed patient needs/concerns: Pt did not note any concerns/ needs at this time     Discharge Plan or Goal  Plan   Discharge to treatment   Care Team   Dr. Jessica Amezquita at [facility]     Barriers to Discharge   Ongoing stabilization      Referral Status  PT will need CD, psych and therapy referrals      Legal Status  Voluntary

## 2022-04-16 PROCEDURE — 124N000002 HC R&B MH UMMC

## 2022-04-16 PROCEDURE — 250N000013 HC RX MED GY IP 250 OP 250 PS 637: Performed by: PSYCHIATRY & NEUROLOGY

## 2022-04-16 PROCEDURE — H2032 ACTIVITY THERAPY, PER 15 MIN: HCPCS

## 2022-04-16 PROCEDURE — 250N000013 HC RX MED GY IP 250 OP 250 PS 637

## 2022-04-16 RX ORDER — CLOBETASOL PROPIONATE 0.5 MG/G
CREAM TOPICAL 2 TIMES DAILY PRN
Status: DISCONTINUED | OUTPATIENT
Start: 2022-04-16 | End: 2022-04-25 | Stop reason: HOSPADM

## 2022-04-16 RX ADMIN — CLOBETASOL PROPIONATE: 0.5 CREAM TOPICAL at 19:52

## 2022-04-16 RX ADMIN — ESCITALOPRAM OXALATE 20 MG: 20 TABLET ORAL at 08:36

## 2022-04-16 RX ADMIN — DOCOSANOL: 100 CREAM TOPICAL at 10:52

## 2022-04-16 RX ADMIN — DOCOSANOL: 100 CREAM TOPICAL at 08:37

## 2022-04-16 RX ADMIN — DOCOSANOL: 100 CREAM TOPICAL at 21:10

## 2022-04-16 RX ADMIN — RISPERIDONE 1 MG: 1 TABLET ORAL at 08:36

## 2022-04-16 RX ADMIN — DOCOSANOL: 100 CREAM TOPICAL at 13:37

## 2022-04-16 RX ADMIN — NICOTINE POLACRILEX 2 MG: 2 LOZENGE ORAL at 11:05

## 2022-04-16 RX ADMIN — RISPERIDONE 2 MG: 2 TABLET ORAL at 21:10

## 2022-04-16 RX ADMIN — NICOTINE POLACRILEX 4 MG: 2 LOZENGE ORAL at 08:38

## 2022-04-16 RX ADMIN — DOCOSANOL: 100 CREAM TOPICAL at 18:26

## 2022-04-16 RX ADMIN — MELATONIN TAB 3 MG 3 MG: 3 TAB at 21:10

## 2022-04-16 ASSESSMENT — ACTIVITIES OF DAILY LIVING (ADL)
DRESS: SCRUBS (BEHAVIORAL HEALTH)
ORAL_HYGIENE: INDEPENDENT
ORAL_HYGIENE: INDEPENDENT
LAUNDRY: WITH SUPERVISION
LAUNDRY: WITH SUPERVISION
HYGIENE/GROOMING: INDEPENDENT
HYGIENE/GROOMING: INDEPENDENT
DRESS: INDEPENDENT

## 2022-04-16 NOTE — PLAN OF CARE
"Patient was in attendance in group this morning. Patient has been quietly participating today. Patient denied anxiety, suicidal thoughts, and depression. Patient reported AM Risperdal dose is helpful. This RN writer asked patient about concern over possible crushing and snorting of Nicotine Lozenge last evening. Patient denied it, \"No!\"  He was compliant with medications. No prn meds have been given yet today.  Blood pressure (!) 142/72, pulse 78, temperature 97.6  F (36.4  C), temperature source Temporal, resp. rate 15, height 1.702 m (5' 7\"), weight 92.4 kg (203 lb 9.6 oz), SpO2 97 %.       Patient showed this RN patches of dry looking, rough skin on each elbow, and requested his cream for psoriasis, Clobetasol. Order received from Dr Pierre. Patient offered use of cream at 1340, but he was napping and said he would use it later.              "

## 2022-04-16 NOTE — PLAN OF CARE
Goal Outcome Evaluation:      Patient has been visible on the unit, pacing the halls or watching TV, minimal interaction with peers.  Requested and received prn Nicotine lozenge x 1 (see previous note) and Melatonin (see MAR).      Denies SI/SIB/HI or AVH.

## 2022-04-16 NOTE — PROGRESS NOTES
04/15/22 2200   Group Therapy Session   Group Attendance attended group session   Time Session Began 2010   Time Session Ended 2100   Total Time patient participated (minutes) 50   Total # Attendees 3   Group Type psychotherapeutic   Group Topic Covered emotions/expression;problem-solving;relaxation techniques   Group Session Detail mindfulness, strengths, art   Patient Response/Contribution cooperative with task;discussed personal experience with topic;listened actively   Pt reported ariving today from medical unit and being confused about what got him here, he wants to get better. He said his top strengths are being friendly, caring and feeling. He made an image of two video game characters that were helping or mentoring each other. He alluded to struggling with some personalities at work but remaining feeling , friendly and caring in this situation.

## 2022-04-16 NOTE — PROGRESS NOTES
"At 1800 patient requested and received 2 Nicorette lozenges.  Writer also offered and patient received Abreva.  Patient quickly grabbed the med cup with the Nicorette lozenges and went to his room. Within a minute or so this writer went to patient's room to have him sign in Voluntary to Station 32 (72 hour hold ) patient was in the bathroom, however, quickly came out holding the med cup with a partially dissolved Nicorette lozenge in it (looked soft, melted).  Patient signed the paper and when patient noticed writer looking in the cup he angrily stated \"what what??\"  Since writer was the only staff present with patient the issue was not addressed at the time.  "

## 2022-04-16 NOTE — PLAN OF CARE
Problem: Sleep Impairment (Anxiety Signs/Symptoms)  Goal: Improved Sleep (Anxiety Signs/Symptoms)  4/16/2022 0443 by Ruth Stubbs, RN  Outcome: Ongoing, Progressing    Patient slept approximately 7 hours this shift, no prn given or requested, safety checks in progress every 15 minutes, no other known concerns at this time. Will monitor and assist as needed

## 2022-04-17 PROCEDURE — H2032 ACTIVITY THERAPY, PER 15 MIN: HCPCS

## 2022-04-17 PROCEDURE — 250N000013 HC RX MED GY IP 250 OP 250 PS 637: Performed by: PSYCHIATRY & NEUROLOGY

## 2022-04-17 PROCEDURE — 250N000013 HC RX MED GY IP 250 OP 250 PS 637

## 2022-04-17 PROCEDURE — 124N000002 HC R&B MH UMMC

## 2022-04-17 RX ADMIN — NICOTINE POLACRILEX 2 MG: 2 LOZENGE ORAL at 08:07

## 2022-04-17 RX ADMIN — CLOBETASOL PROPIONATE: 0.5 CREAM TOPICAL at 08:06

## 2022-04-17 RX ADMIN — NICOTINE POLACRILEX 4 MG: 2 LOZENGE ORAL at 17:34

## 2022-04-17 RX ADMIN — DOCOSANOL: 100 CREAM TOPICAL at 11:39

## 2022-04-17 RX ADMIN — ESCITALOPRAM OXALATE 20 MG: 20 TABLET ORAL at 08:04

## 2022-04-17 RX ADMIN — NICOTINE POLACRILEX 2 MG: 2 LOZENGE ORAL at 09:51

## 2022-04-17 RX ADMIN — MELATONIN TAB 3 MG 3 MG: 3 TAB at 21:46

## 2022-04-17 RX ADMIN — DOCOSANOL: 100 CREAM TOPICAL at 08:04

## 2022-04-17 RX ADMIN — RISPERIDONE 2 MG: 2 TABLET ORAL at 21:24

## 2022-04-17 RX ADMIN — RISPERIDONE 1 MG: 1 TABLET ORAL at 08:04

## 2022-04-17 NOTE — PLAN OF CARE
Goal Outcome Evaluation:    Plan of Care Reviewed With: patient     Patient spent time napping this evening and then watching TV.  Has a brighter affect this evening.  Denies feeling any withdrawal symptoms.  Ate 100% of dinner and snack.  No report of physical issues except for used PRN cream for elbow dryness.      Denies SI/SIB/HI or AVH.

## 2022-04-17 NOTE — PLAN OF CARE
Pt appeared to sleep 7 hours. No concerns reported. Safety checks done at least every 15 minutes.

## 2022-04-17 NOTE — PROGRESS NOTES
"   04/16/22 2100   Group Therapy Session   Group Attendance attended group session   Time Session Began 2000   Time Session Ended 2100   Total Time patient participated (minutes) 60   Total # Attendees 6   Group Type expressive therapy   Group Topic Covered emotions/expression   Patient Response/Contribution cooperative with task   Pt attended 60 minutes of group this evening. Pts were encouraged to try a mindfulness technique by creating a response to a recent pleasant emotional experience. Pts used watercolors and also had the choice of expression through creative writing  Goals of directive: mindfulness, DBT skills-emotional regulation, distress tolerance  Pt was an engaged participant, focused on task for the full duration of group. Pt finished a watercolor painting and Haiku poem and briefly shared with group. Pt painted an image inspired by himself dreaming and referred to himself as \"an active dreamer.\" Pt was an active part of group discussion and gave positive feedback to peers.  Pts mood was calm, pleasant participant.  "

## 2022-04-17 NOTE — PLAN OF CARE
"Patient was up for breakfast and medications this morning. Patient continues to be minimally social with peers. He has a restricted affect. Patient denied depression, suicidal thoughts, and anxiety this morning. Patient has continued to be up in the milieu throughout the morning. Patient was observed watching TV later in morning and socializing with peers. Patient napped later in shift.  Blood pressure 123/78, pulse 82, temperature 97.4  F (36.3  C), temperature source Temporal, resp. rate 16, height 1.702 m (5' 7\"), weight 94.3 kg (208 lb), SpO2 97 %.                      "

## 2022-04-18 PROCEDURE — 250N000013 HC RX MED GY IP 250 OP 250 PS 637

## 2022-04-18 PROCEDURE — 99232 SBSQ HOSP IP/OBS MODERATE 35: CPT

## 2022-04-18 PROCEDURE — H2032 ACTIVITY THERAPY, PER 15 MIN: HCPCS

## 2022-04-18 PROCEDURE — 250N000013 HC RX MED GY IP 250 OP 250 PS 637: Performed by: PSYCHIATRY & NEUROLOGY

## 2022-04-18 PROCEDURE — 124N000002 HC R&B MH UMMC

## 2022-04-18 PROCEDURE — G0177 OPPS/PHP; TRAIN & EDUC SERV: HCPCS

## 2022-04-18 RX ADMIN — NICOTINE POLACRILEX 4 MG: 2 LOZENGE ORAL at 18:54

## 2022-04-18 RX ADMIN — RISPERIDONE 1 MG: 1 TABLET ORAL at 08:15

## 2022-04-18 RX ADMIN — ESCITALOPRAM OXALATE 20 MG: 20 TABLET ORAL at 08:15

## 2022-04-18 RX ADMIN — MELATONIN TAB 3 MG 3 MG: 3 TAB at 21:05

## 2022-04-18 RX ADMIN — RISPERIDONE 2 MG: 2 TABLET ORAL at 21:05

## 2022-04-18 RX ADMIN — NICOTINE POLACRILEX 2 MG: 2 LOZENGE ORAL at 14:01

## 2022-04-18 RX ADMIN — DOCOSANOL: 100 CREAM TOPICAL at 18:53

## 2022-04-18 RX ADMIN — GABAPENTIN 100 MG: 100 CAPSULE ORAL at 14:01

## 2022-04-18 RX ADMIN — NICOTINE POLACRILEX 2 MG: 2 LOZENGE ORAL at 09:10

## 2022-04-18 ASSESSMENT — ACTIVITIES OF DAILY LIVING (ADL)
ORAL_HYGIENE: INDEPENDENT
DRESS: INDEPENDENT
HYGIENE/GROOMING: INDEPENDENT
ORAL_HYGIENE: INDEPENDENT
LAUNDRY: WITH SUPERVISION
DRESS: INDEPENDENT
LAUNDRY: WITH SUPERVISION
HYGIENE/GROOMING: INDEPENDENT

## 2022-04-18 NOTE — PLAN OF CARE
04/18/22 1510   Individualization/Patient Specific Goals   Patient Personal Strengths self-reliant;resourceful   Patient Vulnerabilities family/relationship conflict;history of unsuccessful treatment;housing insecurity;lacks insight into illness;legal concerns;poor impulse control;limited support system;substance abuse/addiction   Anxieties, Fears or Concerns None noted. Pt noted that he is missing personal belongings and that his belongings are with a friend. Needs assistance problem solving   Individualized Care Needs CD treatment   Patient-Specific Goals (Include Timeframe) Enroll into CD treatment   Interprofessional Rounds   Summary Team discussed discharge planning to CD treatment. Pt is accepted to [facility] and can discharge Weds, but pt is inquiring about [facility]   Participants advanced practice nurse;CTC;nursing   Team Discussion   Participants Provider: Sherron KRUEGER NP, CTC: Catina RODRIGUEZ [facility], and nurse: SUSI Jackson   Progress Pt's symptoms reducing and is waiting for admission to CD treatment. He is medication compliant   Anticipated length of stay 1 week dicharge 4/25   Continued Stay Criteria/Rationale ongoing stabilization and enrolling into CD treatment   Medical/Physical no acute medical/ physical needs are being addressed at this time   Precautions PRECAUTIONS AND SAFETY    Behavioral Orders   Procedures    Assault precautions    Code 1 - Restrict to Unit    Elopement precautions    Fall precautions    Routine Programming     As clinically indicated    Status 15     Every 15 minutes.    Suicide precautions     Patients on Suicide Precautions should have a Combination Diet ordered that includes a Diet selection(s) AND a Behavioral Tray selection for Safe Tray - with utensils, or Safe Tray - NO utensils      Withdrawal precautions       Safety  Safety WDL: WDL  [facility]  Observed Behavior: calm, sitting  Observed Behavior (Comment): none  Safety Measures:  environmental rounds completed, safety rounds completed, suicide check-in completed  Diversional Activity: art work, television  Suicidality: Status 15  Withdrawal: monitor withdrawal process  Assault: status 15  Elopement: status 15, signs posted on unit entrance / exit doors          Rationale for change in precautions or plan Pt will discharge to [facility] instead of [facility]   Anticipated Discharge Disposition substance use treatment

## 2022-04-18 NOTE — PROGRESS NOTES
" 04/17/22 2100   Group Therapy Session   Time Session Began 2000   Time Session Ended 2055   Total Time patient participated (minutes) 53   Total # Attendees 7   Group Type expressive therapy   Group Topic Covered balanced lifestyle;emotions/expression;self-care activities;structured socialization   Group Session Detail Songs of Inner Strength   Patient Response/Contribution cooperative with task   Patient Response Detail Pts engaged with music to build and renew their inner strength through spontaneous responses to music: spontaneous singing, organic dance/movment (non-prompted, inspired by the music) and social engagement.  Bright affect.  Group ended with the song \"Shine Bright Like a Juanis\" by Shilpi with all pts engaging in group dynamic and process of building inner strength through spontaneous responses to music.   Group on this unit is currently very cohesive and supportive of one another.     Terrance participated without inhibition, but was a positive group member.        "

## 2022-04-18 NOTE — PROGRESS NOTES
"[facility],  Psychiatric Progress Note      Impression:     Terrance Fletcher is a 31 year old male with history of methamphetamine abuse with associated psychosis who presents with mental health problem. EMS reported that patient woke up in the walk-in cooler of a [employer] where he works in [place]. On arrival Terrance believed that he was being chased. He then fled and was eventually contacted by EMS who transported him here. He dose not know how he came to [place] from [place]. Patient stated he was unsure if he used any substances. He kept glancing to the corner of the room and did not make make consistent eye contact but denied hallucinations during triage. Patient reported relapsing from Meth use and has a long history of substance abuse and CD treatments. He was recently discharged into a treatment facility and relapsed after discharging and going to a friends house. He currently denies suicidal ideations, denied visual or auditory hallucinations. He reports feeling \"irritable\" and endorses symptoms of depression and anxiety rating depression at \"6/10\" and anxiety at \"8/10\".  He reports a time frame of three months for symptoms with no coping skills, drug use and  homelessness as a exacerbating factors.           Diagnoses:     Psychosis Unspecified Psychosis type (H)  Substance-induced psychosis (methamphetamine, cannabis)  Likely component of malingering  History of major depressive disorder  Rule out antisocial personality disorder  Methamphetamine use disorder  Cannabis use disorder  Nicotine use disorder  Psoriasis         Plan:     Medications:  Start taking Risperidone 1 mg in the morning  Continue taking Escitalopram (Lexapro) 20 mg daily for depression  Continue taking PRN Gabapentin as needed for anxiety  Continue taking Risperidone 2 mg at bedtime  Continue taking available PRN medications  Attestation:  Patient has been seen and evaluated by me,  " "BRITTANY HILL, ELVIRA WINSTON  The patient was counseled on  nature of illness and treatment plan/options          Interim History:   The patient's care was discussed with the treatment team and chart notes were reviewed. Records indicate sleeping 7.5 hours, attending groups and participating.    Patient was agreeable to be interviewed and presented with a good mood. He had a bright affect making good eye contact and smiling with conversation. He denied having any immediate concerns and reports to e eating and sleeping well. He denied having any side effects and states his anxiety and depression have \"gone down\". \"Risperdal is helping\", requested an increase in his morning dose even though he states he is feeling good. Educated on the side effects and efficacy of medications. He smiled and stated \"I'm fine on a total of 3 mg). No overt concerns. No additional concerns/questions.  He is focused on discharge and expresses wanting to go to [facility] even though [facility] has accepted patient for Wednesday intake.      No change in medical status    The Review of Systems is negative other than noted in the HPI         Medications:     Current Facility-Administered Medications   Medication     acetaminophen (TYLENOL) tablet 650 mg     alum & mag hydroxide-simethicone (MAALOX) suspension 30 mL     clobetasol (TEMOVATE) 0.05 % cream     docosanol (ABREVA) 10 % cream     escitalopram (LEXAPRO) tablet 20 mg     gabapentin (NEURONTIN) capsule 100 mg     melatonin tablet 3 mg     nicotine (COMMIT) lozenge 2-4 mg     OLANZapine (zyPREXA) tablet 10 mg    Or     OLANZapine (zyPREXA) injection 10 mg     risperiDONE (risperDAL) tablet 1 mg     risperiDONE (risperDAL) tablet 2 mg     traZODone (DESYREL) tablet 50 mg             Allergies:   No Known Allergies         Psychiatric Examination:   /78 (BP Location: Left arm, Patient Position: Sitting, Cuff Size: Adult Regular)   Pulse 92   Temp 97.8  F (36.6  C) (Temporal)   Resp 16   " "Ht 1.702 m (5' 7\")   Wt 94.3 kg (208 lb)   SpO2 97%   BMI 32.58 kg/m    Weight is 208 lbs 0 oz  Body mass index is 32.58 kg/m .    Appearance:  awake, alert, adequately groomed and dressed in hospital scrubs  Attitude:  cooperative  Eye Contact:  good  Mood:  good  Affect:  appropriate and in normal range  Speech:  clear, coherent and normal prosody  Psychomotor Behavior:  no evidence of tardive dyskinesia, dystonia, or tics  Thought Process:  logical and goal oriented  Associations:  no loose associations  Thought Content:  no evidence of suicidal ideation or homicidal ideation, no auditory hallucinations present and no visual hallucinations present  Insight:  good  Judgment:  intact  Oriented to:  time, person, and place  Attention Span and Concentration:  intact  Recent and Remote Memory:  intact  Language: Able to read and write  Fund of Knowledge: appropriate  Muscle Strength and Tone: normal  Gait and Station: Normal         Labs:   No results found for this or any previous visit (from the past 24 hour(s)).  "

## 2022-04-18 NOTE — PLAN OF CARE
04/18/22 1200   General Information   Date Initially Attended OT 04/18/22 04/18/22 1249   Group Therapy Session   Group Attendance attended group session   Time Session Began 1015   Time Session Ended 1145   Total Time patient participated (minutes) 45   Total # Attendees 5   Group Type (Occupational Therapy)   Group Topic Covered balanced lifestyle;coping skills/lifestyle management;leisure exploration/use of leisure time;relaxation techniques   Group Session Detail OT Clinic   Patient Response Detail Intervention: Pt attended OT Clinic with 4 peers.     Patient Response: Pt was oriented to OT Clinic group and project options available to them. Pt elected to work on a suncatcher painting project after seeing several other peers working on one as well. Pt was moderately social with peers during group, sharing painting supplies and appeared comfortable interacting with peers and writer. Pt demonstrated focus and attention while working on project and respectfully and appropriately used materials.     Mood/Affect: Pleasant       Plan: Patient encouraged to maintain attendance for continued ongoing support in working towards occupational therapy goals to support overall treatment/care. More information is needed to complete initial OT assessment. As group attendance is established, OT will provide pt with self-assessment form to inform treatment planning/goal setting, and provide explanation of the benefit of participation in occupational therapy services to overall treatment/care during hospitalization.

## 2022-04-18 NOTE — PLAN OF CARE
New discharge plan. Pt is requesting to discharge to [facility] instead of [facility]. Pt will discharge on Monday April 25, 2022 at 9am. [facility] will transport pt and call the nursing station before arriving to confirm that medication have arrived to the unit. Medication: 30 day supply in hand.     Tasks Complete:    [facility] called [facility] regarding admission time and referral paperwork - [phone]. PT accepted and able to   9AM  time they'll call to confirm   30 day supply med in hand   Send discharge summary to [facility] ([facility])      CTC spoke with pt about which cd treatment he prefers- pt chose [facility]. Consulted with provider         Post round meeting with team @9:30 am updates: Team discussed discharge planning. PT is wanting to discharge to [facility] instead of [facility]     Current Symptoms include the following: Pt symptoms reducing and is medication compliant.     Addressed patient needs/concerns: No concerns     Discharge Plan or Goal   Plan   discharge to CD treatment ( [facility]) with OP services     Care Team   Dr. Jessica Amezquita at [facility]     Barriers to Discharge   Ongoing stabilization      Referral Status  Referrals made to the following CD treatment programs  - [facility]- pt accepted 4/20  - [facility]- pt accepted 4/25  - [facility]- it is not pt's preference to discharge to [facility]      Legal Status  Voluntary

## 2022-04-18 NOTE — PLAN OF CARE
"Goal Outcome Evaluation:    Plan of Care Reviewed With: patient     RN Note:    Patient presents with Restricted affect and  calm  mood. Patient was calm and cooperative during this shift. Patient denies depression, SI, SIB, HI, and AVH. Patient denies  pain. Patient was observed watching television and socializing with his peers. Patient intermittently resting in bed throughout shift. Patient  did attend groups. Patient refused all 3 doses of Abrevia this shift stating \"I don't need that. I will just let it heal.\" No medication side effects observed or endorsed by patient.    Incidents to note:    Patient used computer to attempt to message friend about obtaining his personal belongings before heading to CD treatment. Patient was unable to login. He stated \"it's fine. I'll just deal with it. I might be able to call my mom.\" Patient later endorsed anxiety 5/10 r/t this situation. Requested and received PRN gabapentin 100 mg @1400.    /78 (BP Location: Left arm, Patient Position: Sitting, Cuff Size: Adult Regular)   Pulse 92   Temp 97.8  F (36.6  C) (Temporal)   Resp 16   Ht 1.702 m (5' 7\")   Wt 94.3 kg (208 lb)   SpO2 97%   BMI 32.58 kg/m    "

## 2022-04-18 NOTE — PLAN OF CARE
04/18/22 1418   Group Therapy Session   Group Attendance attended group session   Time Session Began 1315   Time Session Ended 1400   Total Time patient participated (minutes) 15 (No charge)   Total # Attendees 5   Group Type Occupational Therapy   Group Topic Covered balanced lifestyle;coping skills/lifestyle management;leisure exploration/use of leisure time;relaxation techniques;self-care activities;structured socialization   Group Session Detail OT Leisure Group   Patient Response Detail Intervention: Pt attended OT Leisure Group with 4 peers.     Patient Response: Pt participated in a group [employer] game for leisure participation, socialization and movement. Pt was an active participant for their time in group, joining for the final 20 minutes of group and leaving 5 minutes early, stating they were tired.     Mood/Affect: Pleasant       Plan: Patient encouraged to maintain attendance for continued ongoing support in working towards occupational therapy goals to support overall treatment/care.

## 2022-04-18 NOTE — PLAN OF CARE
"Goal Outcome Evaluation:        Problem: Mood Impairment (Anxiety Signs/Symptoms)  Goal: Improved Mood Symptoms (Anxiety Signs/Symptoms)  Outcome: Ongoing, Progressing     Patient present in the milieu most of the evening.  He joined in when peers initiated activities but seldom spoke spontaneously.  Patient denied SI, HI, depression, anxiety,  SIB, thought problems, disorientation, hallucinations, delusions, paranoia, physical problems, and medication side effects.  Patient refused both scheduled doses of Abreva tonight, stating he didn't need them.  Patient requested and received PRN melatonin at HS.  /78 (BP Location: Left arm, Patient Position: Sitting, Cuff Size: Adult Regular)   Pulse 82   Temp 97.4  F (36.3  C) (Temporal)   Resp 16   Ht 1.702 m (5' 7\")   Wt 94.3 kg (208 lb)   SpO2 97%   BMI 32.58 kg/m    '              "

## 2022-04-19 LAB — SARS-COV-2 RNA RESP QL NAA+PROBE: NEGATIVE

## 2022-04-19 PROCEDURE — 250N000013 HC RX MED GY IP 250 OP 250 PS 637: Performed by: PSYCHIATRY & NEUROLOGY

## 2022-04-19 PROCEDURE — U0003 INFECTIOUS AGENT DETECTION BY NUCLEIC ACID (DNA OR RNA); SEVERE ACUTE RESPIRATORY SYNDROME CORONAVIRUS 2 (SARS-COV-2) (CORONAVIRUS DISEASE [COVID-19]), AMPLIFIED PROBE TECHNIQUE, MAKING USE OF HIGH THROUGHPUT TECHNOLOGIES AS DESCRIBED BY CMS-2020-01-R: HCPCS

## 2022-04-19 PROCEDURE — H2032 ACTIVITY THERAPY, PER 15 MIN: HCPCS

## 2022-04-19 PROCEDURE — G0177 OPPS/PHP; TRAIN & EDUC SERV: HCPCS

## 2022-04-19 PROCEDURE — 250N000013 HC RX MED GY IP 250 OP 250 PS 637

## 2022-04-19 PROCEDURE — 99231 SBSQ HOSP IP/OBS SF/LOW 25: CPT

## 2022-04-19 PROCEDURE — 124N000002 HC R&B MH UMMC

## 2022-04-19 RX ADMIN — NICOTINE POLACRILEX 4 MG: 2 LOZENGE ORAL at 15:56

## 2022-04-19 RX ADMIN — ESCITALOPRAM OXALATE 20 MG: 20 TABLET ORAL at 08:26

## 2022-04-19 RX ADMIN — MELATONIN TAB 3 MG 3 MG: 3 TAB at 21:01

## 2022-04-19 RX ADMIN — RISPERIDONE 2 MG: 2 TABLET ORAL at 21:01

## 2022-04-19 RX ADMIN — RISPERIDONE 1 MG: 1 TABLET ORAL at 08:26

## 2022-04-19 ASSESSMENT — ACTIVITIES OF DAILY LIVING (ADL)
HYGIENE/GROOMING: INDEPENDENT
ORAL_HYGIENE: INDEPENDENT
DRESS: INDEPENDENT
LAUNDRY: UNABLE TO COMPLETE

## 2022-04-19 NOTE — PLAN OF CARE
"Problem: Behavioral Health Plan of Care  Goal: Optimal Comfort and Wellbeing  Outcome: Ongoing, Progressing  Intervention: Provide Person-Centered Care    Goal: Optimized Coping Skills in Response to Life Stressors  Outcome: Ongoing, Progressing    Patient is pleasant and cooperative.  Up in milieu intermittently and engages with select peers.  He denies all mental health symptoms and states, \"I'm fine.\"  Patient stated that he is starting to get a cough.  Patient not observed by writer coughing.  Routine covid test completed today which was negative.  Patient is medication compliant and remains safe on unit.  Will continue to monitor.  Renetta Mauricio RN     "

## 2022-04-19 NOTE — PLAN OF CARE
Terrance was out of his room most of the shift watching TV. Pleasant, calm, cooperative. He currently denies mental health symptoms. He states he has no questions, complaints or concerns, he is just waiting until discharge to treatment next week.  He was medication compliant.   No cough observed this shift.     He attended art group in the evening.     Problem: Mood Impairment (Anxiety Signs/Symptoms)  Goal: Improved Mood Symptoms (Anxiety Signs/Symptoms)  Outcome: Ongoing, Progressing         Goal Outcome Evaluation:

## 2022-04-19 NOTE — PROGRESS NOTES
"[facility],  Psychiatric Progress Note      Impression:     Terrance Fletcher is a 31 year old male with history of methamphetamine abuse with associated psychosis who presents with mental health problem. EMS reported that patient woke up in the walk-in cooler of a [employer] where he works in [place]. On arrival Terrance believed that he was being chased. He then fled and was eventually contacted by EMS who transported him here. He dose not know how he came to [place] from [place]. Patient stated he was unsure if he used any substances. He kept glancing to the corner of the room and did not make make consistent eye contact but denied hallucinations during triage. Patient reported relapsing from Meth use and has a long history of substance abuse and CD treatments. He was recently discharged into a treatment facility and relapsed after discharging and going to a friends house. He currently denies suicidal ideations, denied visual or auditory hallucinations. He reports feeling \"irritable\" and endorses symptoms of depression and anxiety rating depression at \"6/10\" and anxiety at \"8/10\".  He reports a time frame of three months for symptoms with no coping skills, drug use and  homelessness as a exacerbating factors.          Diagnoses:     Psychosis Unspecified Psychosis type (H)  Substance-induced psychosis (methamphetamine, cannabis)  Likely component of malingering  History of major depressive disorder  Rule out antisocial personality disorder  Methamphetamine use disorder  Cannabis use disorder  Nicotine use disorder  Psoriasis         Plan:   Labs:  none needed at this time    Attestation:  Patient has been seen and evaluated by me,  ELVIRA CRUZ CNP  The patient was counseled on  nature of illness and treatment plan/options          Interim History:   The patient's care was discussed with the treatment team and chart notes were reviewed. Records indicate " "patient is averaging 7.5 hours of sleep and eating 100% of meals.    Patient was agreeable to interview and presented with a bright affect. He is in a good mood and denied having any concerns. Denied having any anxiety stated \"the meds are working fine\". Denied having any side effects to medications. He is psychiatrically stable at baseline and continues to await discharge coordination.    Discharge Disposition: Planned discharge for Monday 4/25/22.     No change in medical status    The Review of Systems is negative other than noted in the HPI         Medications:     Current Facility-Administered Medications   Medication     acetaminophen (TYLENOL) tablet 650 mg     alum & mag hydroxide-simethicone (MAALOX) suspension 30 mL     clobetasol (TEMOVATE) 0.05 % cream     escitalopram (LEXAPRO) tablet 20 mg     gabapentin (NEURONTIN) capsule 100 mg     melatonin tablet 3 mg     nicotine (COMMIT) lozenge 2-4 mg     OLANZapine (zyPREXA) tablet 10 mg    Or     OLANZapine (zyPREXA) injection 10 mg     risperiDONE (risperDAL) tablet 1 mg     risperiDONE (risperDAL) tablet 2 mg     traZODone (DESYREL) tablet 50 mg             Allergies:   No Known Allergies         Psychiatric Examination:   /83 (BP Location: Left arm, Patient Position: Sitting, Cuff Size: Adult Regular)   Pulse 82   Temp 97.8  F (36.6  C) (Temporal)   Resp 16   Ht 1.702 m (5' 7\")   Wt 94.8 kg (209 lb)   SpO2 96%   BMI 32.73 kg/m    Weight is 209 lbs 0 oz  Body mass index is 32.73 kg/m .    Appearance:  awake, alert, adequately groomed and dressed in hospital scrubs  Attitude:  cooperative  Eye Contact:  good  Mood:  good  Affect:  appropriate and in normal range  Speech:  clear, coherent and normal prosody  Psychomotor Behavior:  no evidence of tardive dyskinesia, dystonia, or tics  Thought Process:  logical and goal oriented  Associations:  no loose associations  Thought Content:  no evidence of suicidal ideation or homicidal ideation, no " auditory hallucinations present and no visual hallucinations present  Insight:  good  Judgment:  intact  Oriented to:  time, person, and place  Attention Span and Concentration:  intact  Recent and Remote Memory:  intact  Language: Able to name objects, Able to repeat phrases and Able to read and write  Fund of Knowledge: appropriate  Muscle Strength and Tone: normal  Gait and Station: Normal         Labs:   No results found for this or any previous visit (from the past 24 hour(s)).

## 2022-04-19 NOTE — PROGRESS NOTES
04/18/22 2200   Group Therapy Session   Group Attendance attended group session   Time Session Began 2000   Time Session Ended 2050   Total Time patient participated (minutes) 50   Total # Attendees 6   Group Type recreation   Group Topic Covered leisure exploration/use of leisure time   Group Session Detail TR leisure group   Patient Response/Contribution cooperative with task   Patient Response Detail Pt attended the structured Therapeutic Recreation group, participating in a group activity. Pt participated in group discussion and leisure participation as a healthy outlet to gain self-esteem, manage behaviors, improve social skills, and decrease isolation.  Pt remained focused and engaged throughout group activity.  Pt mood was sociable and was appropriate with interactions, contributing to the clues and descriptions throughout the activity. Pt needed many reminders throughout group to wear mask properly.

## 2022-04-19 NOTE — PLAN OF CARE
Tasks Complete:  Referral for CM started. Will follow up with [facility] and CM referral tomorrow.          Post round meeting with team @9:30 am updates: Team discussed discharge planning.      Current Symptoms include the following: Pt symptoms reducing and is medication compliant.      Addressed patient needs/concerns: No concerns      Discharge Plan or Goal   Plan   discharge to CD treatment ( [facility]) with OP services     Care Team   Dr. Jessica Amezquita at [facility]     Barriers to Discharge   Ongoing stabilization      Referral Status  Referrals made to the following CD treatment programs  - [facility]- pt accepted 4/20  - [facility]- pt accepted 4/25  - [facility]- it is not pt's preference to discharge to [facility]      Legal Status  Voluntary

## 2022-04-19 NOTE — PLAN OF CARE
Pt appeared to sleep 7 hours. No concerns reported. Safety checks done at least every 15 minutes.

## 2022-04-19 NOTE — PLAN OF CARE
Goal Outcome Evaluation:    Plan of Care Reviewed With: patient      Patient has a brighter affect this shift compared to weekend shifts.  Spends time walking the halls, minimal interaction with others, however, is visible in the milieu.  Reports eating and sleeping fine.      Requested and received PRN Melatonin at HS.  Refused one of the doses of Abreva this shift.      Denies SI/SIB/HI and AVH.

## 2022-04-20 PROCEDURE — G0177 OPPS/PHP; TRAIN & EDUC SERV: HCPCS

## 2022-04-20 PROCEDURE — 250N000013 HC RX MED GY IP 250 OP 250 PS 637

## 2022-04-20 PROCEDURE — 99232 SBSQ HOSP IP/OBS MODERATE 35: CPT

## 2022-04-20 PROCEDURE — 250N000013 HC RX MED GY IP 250 OP 250 PS 637: Performed by: PSYCHIATRY & NEUROLOGY

## 2022-04-20 PROCEDURE — 124N000002 HC R&B MH UMMC

## 2022-04-20 RX ADMIN — NICOTINE POLACRILEX 4 MG: 2 LOZENGE ORAL at 08:29

## 2022-04-20 RX ADMIN — RISPERIDONE 2 MG: 2 TABLET ORAL at 21:14

## 2022-04-20 RX ADMIN — ESCITALOPRAM OXALATE 20 MG: 20 TABLET ORAL at 08:27

## 2022-04-20 RX ADMIN — MELATONIN TAB 3 MG 3 MG: 3 TAB at 21:14

## 2022-04-20 RX ADMIN — RISPERIDONE 1 MG: 1 TABLET ORAL at 08:27

## 2022-04-20 RX ADMIN — GABAPENTIN 100 MG: 100 CAPSULE ORAL at 18:34

## 2022-04-20 ASSESSMENT — ACTIVITIES OF DAILY LIVING (ADL)
DRESS: SCRUBS (BEHAVIORAL HEALTH);INDEPENDENT
ORAL_HYGIENE: INDEPENDENT
HYGIENE/GROOMING: INDEPENDENT
LAUNDRY: WITH SUPERVISION

## 2022-04-20 NOTE — PLAN OF CARE
Tasks Complete:      Pt inquired about free phones. He will apply online tomorrow  [email or web address]      Post round meeting with team @9:30 am updates: Discussed discharge plan     Current Symptoms include the following: attends groups, medication compliant, and cooperative     Addressed patient needs/concerns: See above     Discharge Plan or Goal   Plan   discharge to CD treatment ( [facility]) with OP services     Care Team   Dr. Jessica Amezquita at [facility]     Barriers to Discharge   Ongoing stabilization      Referral Status  Referrals made to the following CD treatment programs  - [facility]- pt accepted 4/20  - [facility]- pt accepted 4/25  - [facility]- it is not pt's preference to discharge to [facility]      Legal Status  Voluntary

## 2022-04-20 NOTE — PLAN OF CARE
Initial OT Self Assessment:       04/19/22 2926   General Information   Date Initially Attended OT 04/18/22   Clinical Impression   Affect Appropriate to situation   Orientation Oriented to person, place and time   Appearance and ADLs General cleanliness observed in most areas   Attention to Internal Stimuli No observed signs   Interaction Skills Interacts appropriately with staff;Initiates appropriately with staff  (during structured group)   Ability to Communicate Needs Does so with prompts   Verbal Content Appropriate to topic;Selective   Ability to Maintain Boundaries Maintains appropriate physical boundaries;Maintains appropriate verbal boundaries  (during structured groups)   Participation Participates with minimal encouragement  (inconsistent attendance)   Concentration Concentrates 10-20 minutes   Ability to Concentrate With structure;With refocus   Follows and Comprehends Directions Independently follows 2 step verbal directions;Follows 1 step with demonstration   Organization Needs occasional assistance    Decision Making Needs choices limited to 2 choices  (familiar tasks)   Planning and Problem Solving Indentifies problems but not alternatives   Ability to Apply and Learn Concepts Needs further assessment   Frustrations / Stress Tolerance Indirect responses to frustration  (changed mind vs problem solve thru mistakes/barriers)   Level of Insight Some insight   Self Esteem Takes risks with support and encouragement;Accepts positive feedback   Social Supports Needs further assessment   BE[facility] OT Care Plan Goals   OT Care Plan social interaction

## 2022-04-20 NOTE — PROGRESS NOTES
[facility],  Psychiatric Progress Note      Impression:     Terrance Fletcher is a 31 year old male with history of methamphetamine abuse with associated psychosis who presents with mental health problem. EMS reported that patient woke up in the walk-in cooler of a [employer] where he works in [place]. On arrival Terrance believed that he was being chased. He then fled and was eventually contacted by EMS who transported him here. He dose not know how he came to [place] from [place]. Patient stated he was unsure if he used any substances. He kept glancing to the corner of the room and did not make make consistent eye contact but denied hallucinations during triage. Patient reported relapsing from Meth use and has a long history of substance abuse and CD treatments. He was recently discharged into a treatment facility and relapsed after discharging and going to a friends house. He currently denies suicidal ideations, denied visual or auditory hallucinations.         Diagnoses:     Psychosis Unspecified Psychosis type (H)  Substance-induced psychosis (methamphetamine, cannabis)  Likely component of malingering  History of major depressive disorder  Rule out antisocial personality disorder  Methamphetamine use disorder  Cannabis use disorder  Nicotine use disorder  Psoriasis         Plan:     Medications:  Continue taking Risperidone 1 mg in the morning  Continue taking Escitalopram (Lexapro) 20 mg daily for depression  Continue taking PRN Gabapentin as needed for anxiety  Continue taking Risperidone 2 mg at bedtime  Continue taking available PRN medications    Attestation:  Patient has been seen and evaluated by me,  ELVIRA CRUZ CNP  The patient was counseled on  nature of illness and treatment options          Interim History:   The patient's care was discussed with the treatment team and chart notes were reviewed. Records indicate improved mood, good appetite  "and attendance of groups.     Interviewed patient in his room and he declined having any new immediate concerns. Stated the medications are still working fine, denied any side effects, denied anxiety and stated his depression symptoms have improved. He is homeless and is awaiting discharge to [facility] on Monday. Patient is agreeable and continues to express that this is the best plan for him and not [facility]. No addition concerns.    Discharge disposition: discharge to  treatment ( [facility]) with OP services on Monday @ 0930        Legal Status: Voluntary    No change in medical status    The Review of Systems is negative other than noted in the HPI         Medications:     Current Facility-Administered Medications   Medication     acetaminophen (TYLENOL) tablet 650 mg     alum & mag hydroxide-simethicone (MAALOX) suspension 30 mL     clobetasol (TEMOVATE) 0.05 % cream     escitalopram (LEXAPRO) tablet 20 mg     gabapentin (NEURONTIN) capsule 100 mg     melatonin tablet 3 mg     nicotine (COMMIT) lozenge 2-4 mg     OLANZapine (zyPREXA) tablet 10 mg    Or     OLANZapine (zyPREXA) injection 10 mg     risperiDONE (risperDAL) tablet 1 mg     risperiDONE (risperDAL) tablet 2 mg     traZODone (DESYREL) tablet 50 mg             Allergies:   No Known Allergies         Psychiatric Examination:   /84 (BP Location: Left arm, Patient Position: Sitting)   Pulse 92   Temp 98.2  F (36.8  C) (Temporal)   Resp 17   Ht 1.702 m (5' 7\")   Wt 94.8 kg (209 lb)   SpO2 96%   BMI 32.73 kg/m    Weight is 209 lbs 0 oz  Body mass index is 32.73 kg/m .    Appearance:  awake, alert, adequately groomed and dressed in hospital scrubs  Attitude:  cooperative  Eye Contact:  good  Mood:  good  Affect:  appropriate and in normal range  Speech:  clear, coherent and normal prosody  Psychomotor Behavior:  no evidence of tardive dyskinesia, dystonia, or tics  Thought Process:  logical and goal oriented  Associations:  no loose " associations  Thought Content:  no evidence of suicidal ideation or homicidal ideation, no auditory hallucinations present and no visual hallucinations present  Insight:  good  Judgment:  intact  Oriented to:  time, person, and place  Attention Span and Concentration:  intact  Recent and Remote Memory:  intact  Language: Able to name objects, Able to repeat phrases and Able to read and write  Fund of Knowledge: appropriate  Muscle Strength and Tone: normal  Gait and Station: Normal         Labs:     Recent Results (from the past 24 hour(s))   Asymptomatic COVID-19 Virus (Coronavirus) by PCR Nasopharyngeal    Collection Time: 04/19/22 10:03 AM    Specimen: Nasopharyngeal; Swab   Result Value Ref Range    SARS CoV2 PCR Negative Negative

## 2022-04-20 NOTE — PLAN OF CARE
Goal Outcome Evaluation:     Pt. Is spending majority of the shift in bed, thus far.  The pt. Was approached for a 1:1 and needed encouragement to engage with staff.  The pt. Did not appear motivated to engage with staff or to attend to groups or unit activities.  The pt. States that he  will attend a group in the evening.  The pt. With encouragement states that he is not suicidal, not self-injurious.  The pt. Does get up for meals and medications.  The pt.'s thoughts appear clear and reality based.

## 2022-04-20 NOTE — PLAN OF CARE
OT PROGRESS NOTE:       04/19/22 1416   Group Therapy Session   Group Attendance attended group session   Time Session Began 0915   Time Session Ended 1015   Total Time patient participated (minutes) 45   Total # Attendees 6   Group Type expressive therapy   Group Topic Covered emotions/expression   Group Session Detail Crafts as a medium to assess functional performance and to increase use of executive skills for improved self management.   Patient Response/Contribution cooperative with task;discussed personal experience with topic;expressed understanding of topic   Patient Response Detail Initiated group and quickly decided on familiar task. With set up and verbal directives moved forward with task work. Easily frustrated when task was not progressing as hoped. Limited problem solving and planning. Changed mind to something simpler and same as peer.

## 2022-04-20 NOTE — PROGRESS NOTES
Behavioral Health  Note   Behavioral Health  Spirituality Group Note     Unit 32    Name: Terrance Fletcher    YOB: 1990   MRN: [id number]    Age: 31 year old     Patient attended -led group, which included discussion of spirituality, coping with illness and building resilience.   Patient attended group for 1.0 hrs.   patient demonstrated an appreciation of topic's application for their personal circumstances.     [facility]  Staff    Page [phone]

## 2022-04-20 NOTE — PLAN OF CARE
"   04/19/22 2100   Group Therapy Session   Group Attendance attended group session   Time Session Began 2000   Time Session Ended 2100   Total Time patient participated (minutes) 45   Total # Attendees 6   Group Type expressive therapy  (art therapy)   Group Topic Covered emotions/expression   Group Session Detail collage images   Patient Response/Contribution cooperative with task;discussed personal experience with topic;organized   Patient Response Detail Terrance presented as calm and pleasant. He participated in making a collage of images and words that resonated with him. He appeared focused while making a collage around the theme \"How do I make my vision a reality?\" He included images that represented his priorities and goals. He interacted appropriately with peers, sharing supplies and giving peers positive feedback.   Goal Outcome Evaluation:                      "

## 2022-04-21 PROCEDURE — 250N000013 HC RX MED GY IP 250 OP 250 PS 637: Performed by: PSYCHIATRY & NEUROLOGY

## 2022-04-21 PROCEDURE — 99232 SBSQ HOSP IP/OBS MODERATE 35: CPT

## 2022-04-21 PROCEDURE — 250N000013 HC RX MED GY IP 250 OP 250 PS 637

## 2022-04-21 PROCEDURE — 124N000002 HC R&B MH UMMC

## 2022-04-21 PROCEDURE — G0177 OPPS/PHP; TRAIN & EDUC SERV: HCPCS

## 2022-04-21 RX ORDER — OLANZAPINE 10 MG/1
10 TABLET ORAL 3 TIMES DAILY PRN
Qty: 30 TABLET | Refills: 1 | Status: SHIPPED | OUTPATIENT
Start: 2022-04-21 | End: 2024-06-03

## 2022-04-21 RX ORDER — GABAPENTIN 100 MG/1
100 CAPSULE ORAL 3 TIMES DAILY PRN
Qty: 30 CAPSULE | Refills: 1 | Status: SHIPPED | OUTPATIENT
Start: 2022-04-21 | End: 2024-06-03

## 2022-04-21 RX ORDER — RISPERIDONE 1 MG/1
1 TABLET ORAL DAILY
Qty: 30 TABLET | Refills: 1 | Status: SHIPPED | OUTPATIENT
Start: 2022-04-22 | End: 2024-06-03

## 2022-04-21 RX ORDER — LANOLIN ALCOHOL/MO/W.PET/CERES
3 CREAM (GRAM) TOPICAL
Qty: 30 TABLET | Refills: 1 | Status: SHIPPED | OUTPATIENT
Start: 2022-04-21 | End: 2024-06-03

## 2022-04-21 RX ORDER — CLOBETASOL PROPIONATE 0.5 MG/G
CREAM TOPICAL 2 TIMES DAILY PRN
Qty: 60 G | Refills: 1 | Status: SHIPPED | OUTPATIENT
Start: 2022-04-21 | End: 2024-06-03

## 2022-04-21 RX ORDER — MAGNESIUM HYDROXIDE/ALUMINUM HYDROXICE/SIMETHICONE 120; 1200; 1200 MG/30ML; MG/30ML; MG/30ML
30 SUSPENSION ORAL EVERY 4 HOURS PRN
Qty: 600 ML | Refills: 0 | Status: SHIPPED | OUTPATIENT
Start: 2022-04-21 | End: 2024-06-03

## 2022-04-21 RX ORDER — RISPERIDONE 2 MG/1
2 TABLET ORAL AT BEDTIME
Qty: 30 TABLET | Refills: 1 | Status: SHIPPED | OUTPATIENT
Start: 2022-04-21 | End: 2024-06-03

## 2022-04-21 RX ADMIN — MELATONIN TAB 3 MG 3 MG: 3 TAB at 20:39

## 2022-04-21 RX ADMIN — GABAPENTIN 100 MG: 100 CAPSULE ORAL at 18:34

## 2022-04-21 RX ADMIN — ESCITALOPRAM OXALATE 20 MG: 20 TABLET ORAL at 08:31

## 2022-04-21 RX ADMIN — NICOTINE POLACRILEX 2 MG: 2 LOZENGE ORAL at 17:04

## 2022-04-21 RX ADMIN — RISPERIDONE 2 MG: 2 TABLET ORAL at 20:39

## 2022-04-21 RX ADMIN — NICOTINE POLACRILEX 2 MG: 2 LOZENGE ORAL at 08:33

## 2022-04-21 RX ADMIN — RISPERIDONE 1 MG: 1 TABLET ORAL at 08:31

## 2022-04-21 ASSESSMENT — ACTIVITIES OF DAILY LIVING (ADL)
DRESS: INDEPENDENT
DRESS: INDEPENDENT
HYGIENE/GROOMING: INDEPENDENT
LAUNDRY: WITH SUPERVISION
HYGIENE/GROOMING: INDEPENDENT
ORAL_HYGIENE: INDEPENDENT
LAUNDRY: WITH SUPERVISION
ORAL_HYGIENE: INDEPENDENT

## 2022-04-21 NOTE — PLAN OF CARE
Tasks Complete:        CTC provided pt with printed application for a free phone. Pt will complete and inform [facility] when completed.         Post round meeting with team @9:30 am updates: Discussed discharge plan      Current Symptoms include the following: attends groups, medication compliant, and cooperative      Addressed patient needs/concerns: See above      Discharge Plan or Goal   Plan   discharge to CD treatment ( [facility]) with OP services     Care Team   Dr. Jessica [facility]mezquita at [facility]     Barriers to Discharge   Ongoing stabilization      Referral Status  Referrals made to the following CD treatment programs  - [facility]- pt accepted 4/20  - [facility]- pt accepted 4/25  - [facility]- it is not pt's preference to discharge to [facility]      Legal Status  Voluntary

## 2022-04-21 NOTE — PLAN OF CARE
OT PROGRESS NOTE:       04/20/22 1437   Group Therapy Session   Group Attendance attended group session   Time Session Began 1315   Time Session Ended 1415   Total Time patient participated (minutes) 45   Total # Attendees 5   Group Type life skill   Group Topic Covered balanced lifestyle;coping skills/lifestyle management;self-care activities   Group Session Detail Self esteem discussion and task focused on exploring and implementing ways to improve self esteem for improved self management.   Patient Response/Contribution cooperative with task;discussed personal experience with topic;expressed understanding of topic   Patient Response Detail Actively participated in discussion and task. Spontaneous and insightful in his responses. Tended to use humor to avoid but, with further encouragement was able to respond with some insight and awareness.

## 2022-04-21 NOTE — PLAN OF CARE
"Goal Outcome Evaluation:    Plan of Care Reviewed With: patient     RN Note:    Patient presents with Restricted affect and  calm  mood. Patient was calm and cooperative during this shift. Patient denies anxiety, depression, SI, SIB, HI, and AVH. Patient denies  pain. Patient was observed resting in bed/sleeping between meals. Patient avoids social contact and did not go to group. Patient is med-compliant. No medication side effects observed or endorsed by patient.    Patient verbalizes understanding of treatment/discharge plan for Monday. Patient states \"I'm just trying to get through the days.\" Patient reports he is still trying to obtain his personal belongings from his friend prior to discharge.      /79 (BP Location: Right arm)   Pulse 86   Temp 97.6  F (36.4  C) (Oral)   Resp 18   Ht 1.702 m (5' 7\")   Wt 94.5 kg (208 lb 6.4 oz)   SpO2 97%   BMI 32.64 kg/m        "

## 2022-04-21 NOTE — PLAN OF CARE
"Patient spends most of the time in his room. Came out briefly and watched TV in the lounge. Not observed talking to peers. Was observed making phone calls. When approached, patient stated, \"I got some news I didn't wanna learn\". Patient told writer that there are issues with his belongings. Per patient, his friend who will take care of his belongings \"bailed\" on him. Patient claimed that the situation was making him anxious. Rated anxiety 5/10. Requested for PRN Gabapentin which helped. Denies having depression. Denies SI, HI, SIB and hallucinations. Denies pain. Patient did not attend group. Was med compliant. PRN Melatonin given with hs med per request. Continues to be on assault, fall, suicide, elopement and withdrawal precautions.                     "

## 2022-04-21 NOTE — PLAN OF CARE
OT PROGRESS NOTE:       04/21/22 1251   Group Therapy Session   Group Attendance attended group session   Time Session Began 0915   Time Session Ended 1015   Total Time patient participated (minutes) 45   Total # Attendees 5   Group Type expressive therapy   Group Topic Covered emotions/expression   Group Session Detail Crafts as a medium to assess functional performance and active use of executive skills for improved self management.   Patient Response/Contribution cooperative with task;organized   Patient Response Detail Initiated group and actively participated in task completion. Demonstrated pre planning and some creative task completion. Social with peers, focused x 45 minutes and showed initiative.

## 2022-04-21 NOTE — PLAN OF CARE
Problem: Suicide Risk  Goal: Absence of Self-Harm  Outcome: Ongoing, Progressing     Problem: Activity and Energy Impairment (Anxiety Signs/Symptoms)  Goal: Optimized Energy Level (Anxiety Signs/Symptoms)  Outcome: Ongoing, Progressing  Intervention: Optimize Energy Level  Recent Flowsheet Documentation  Taken 4/21/2022 1738 by Valerie Bruce, RN  Diversional Activity: television  Activity (Behavioral Health): up ad jayna   Goal Outcome Evaluation:    Plan of Care Reviewed With: patient      Patient is withdrawn and isolative to self. Patient is calm, pleasant and cooperative with nursing assessment.Blunted and flat affect.     Medication compliant.    Patient denies mental health symptoms.    Vital signs stable, denies pain.    Good appetite.    Staff will continue to offer support and monitor closely.

## 2022-04-21 NOTE — PLAN OF CARE
OT PROGRESS NOTE:       04/20/22 1430   Group Therapy Session   Group Attendance attended group session   Time Session Began 1015   Time Session Ended 1115   Total Time patient participated (minutes) 45   Total # Attendees 4   Group Type expressive therapy   Group Topic Covered emotions/expression   Group Session Detail Crafts as a medium for functional performance assessessment and increased use of executive skills for improved self management.   Patient Response/Contribution cooperative with task;did not share thoughts verbally;expressed understanding of topic;organized   Patient Response Detail Initiated group participation and actively participated. Needed cuing to gather supplies and begin task completion. Social with staff. Concerned nurse does not think he is attending groups and strongly encouraging him to prep for CD tx where attendance is required.

## 2022-04-21 NOTE — PROGRESS NOTES
[facility],  Psychiatric Progress Note      Impression:     Terrance Fletcher is a 31 year old male with history of methamphetamine abuse with associated psychosis who presents with mental health problem. EMS reported that patient woke up in the walk-in cooler of a [employer] where he works in [place]. On arrival Terrance believed that he was being chased. He then fled and was eventually contacted by EMS who transported him here. He dose not know how he came to [place] from [place]. Patient stated he was unsure if he used any substances. He kept glancing to the corner of the room and did not make make consistent eye contact but denied hallucinations during triage. Patient reported relapsing from Meth use and has a long history of substance abuse and CD treatments. He was recently discharged into a treatment facility and relapsed after discharging and going to a friends house. He currently denies suicidal ideations, denied visual or auditory hallucinations.         Diagnoses:     Psychosis Unspecified Psychosis type (H)  Substance-induced psychosis (methamphetamine, cannabis)  Likely component of malingering  History of major depressive disorder  Rule out antisocial personality disorder  Methamphetamine use disorder  Cannabis use disorder  Nicotine use disorder  Psoriasis         Plan:     Medications:  Continue taking Risperidone 1 mg in the morning  Continue taking Escitalopram (Lexapro) 20 mg daily for depression  Continue taking PRN Gabapentin as needed for anxiety  Continue taking Risperidone 2 mg at bedtime  Continue taking available PRN medications     Attestation:  Patient has been seen and evaluated by me,  ELVIRA CRUZ CNP  The patient was counseled on  nature of illness and treatment options          Interim History:   The patient's care was discussed with the treatment team and chart notes were reviewed. Records indicate he slept 7 hours and attends  "groups. Continues to use PRN Gabapentin.    Patient was agreeable to interview and presented with a bright affect smiling and laughing with conversation. He denied having any immediate concerns and reported having \"good sleep\". He maintains a good appetite. Denied having any side effects to medications. Denied having anxiety at this moment, stated his depression is \"zero\", denied suicidal ideations. He continues to use PRN Gabapentin for anxiety with therapeutic efficacy. Discussed discharge plan and went over medications. His questions on medications were answered. No additional concerns.      No change in medical status    The Review of Systems is negative other than noted in the HPI         Medications:     Current Facility-Administered Medications   Medication     acetaminophen (TYLENOL) tablet 650 mg     alum & mag hydroxide-simethicone (MAALOX) suspension 30 mL     clobetasol (TEMOVATE) 0.05 % cream     escitalopram (LEXAPRO) tablet 20 mg     gabapentin (NEURONTIN) capsule 100 mg     melatonin tablet 3 mg     nicotine (COMMIT) lozenge 2-4 mg     OLANZapine (zyPREXA) tablet 10 mg    Or     OLANZapine (zyPREXA) injection 10 mg     risperiDONE (risperDAL) tablet 1 mg     risperiDONE (risperDAL) tablet 2 mg     traZODone (DESYREL) tablet 50 mg             Allergies:   No Known Allergies         Psychiatric Examination:   BP (!) 129/91 (BP Location: Right arm, Patient Position: Sitting, Cuff Size: Adult Regular)   Pulse 80   Temp (!) 96.5  F (35.8  C) (Tympanic)   Resp 18   Ht 1.702 m (5' 7\")   Wt 94.8 kg (209 lb)   SpO2 98%   BMI 32.73 kg/m    Weight is 209 lbs 0 oz  Body mass index is 32.73 kg/m .    Appearance:  awake, alert, adequately groomed and dressed in hospital scrubs  Attitude:  cooperative  Eye Contact:  good  Mood:  good  Affect:  appropriate and in normal range  Speech:  clear, coherent and normal prosody  Psychomotor Behavior:  no evidence of tardive dyskinesia, dystonia, or tics  Thought " Process:  logical and goal oriented  Associations:  no loose associations  Thought Content:  no evidence of suicidal ideation or homicidal ideation, no auditory hallucinations present and no visual hallucinations present  Insight:  Good. Aware of his struggles with substance abuse.   Judgment:  Fair. Unable to commit to a concrete plan to stay off drugs.   Oriented to:  time, person, and place  Attention Span and Concentration:  intact  Recent and Remote Memory:  intact  Language: Able to read and write  Fund of Knowledge: appropriate  Muscle Strength and Tone: normal  Gait and Station: Normal         Labs:   No results found for this or any previous visit (from the past 24 hour(s)).

## 2022-04-22 PROCEDURE — G0177 OPPS/PHP; TRAIN & EDUC SERV: HCPCS

## 2022-04-22 PROCEDURE — 250N000013 HC RX MED GY IP 250 OP 250 PS 637: Performed by: PSYCHIATRY & NEUROLOGY

## 2022-04-22 PROCEDURE — 250N000013 HC RX MED GY IP 250 OP 250 PS 637

## 2022-04-22 PROCEDURE — 99231 SBSQ HOSP IP/OBS SF/LOW 25: CPT

## 2022-04-22 PROCEDURE — 124N000002 HC R&B MH UMMC

## 2022-04-22 RX ADMIN — RISPERIDONE 2 MG: 2 TABLET ORAL at 20:02

## 2022-04-22 RX ADMIN — MELATONIN TAB 3 MG 3 MG: 3 TAB at 20:02

## 2022-04-22 RX ADMIN — NICOTINE POLACRILEX 4 MG: 2 LOZENGE ORAL at 17:51

## 2022-04-22 RX ADMIN — ESCITALOPRAM OXALATE 20 MG: 20 TABLET ORAL at 08:37

## 2022-04-22 RX ADMIN — RISPERIDONE 1 MG: 1 TABLET ORAL at 08:37

## 2022-04-22 RX ADMIN — CLOBETASOL PROPIONATE: 0.5 CREAM TOPICAL at 15:08

## 2022-04-22 RX ADMIN — NICOTINE POLACRILEX 4 MG: 2 LOZENGE ORAL at 08:55

## 2022-04-22 RX ADMIN — OLANZAPINE 10 MG: 10 TABLET ORAL at 20:02

## 2022-04-22 ASSESSMENT — ACTIVITIES OF DAILY LIVING (ADL)
ORAL_HYGIENE: INDEPENDENT
LAUNDRY: WITH SUPERVISION
HYGIENE/GROOMING: INDEPENDENT
DRESS: INDEPENDENT
HYGIENE/GROOMING: INDEPENDENT
LAUNDRY: WITH SUPERVISION
ORAL_HYGIENE: INDEPENDENT
DRESS: INDEPENDENT

## 2022-04-22 NOTE — PLAN OF CARE
Tasks Complete:      Spoke with pt about discharge planning and housing stabilization services   Post round meeting with team @9:30 am updates: Discussed discharge plan      Current Symptoms include the following: attends groups, medication compliant, and cooperative      Addressed patient needs/concerns: See above      Discharge Plan or Goal   Plan   discharge to CD treatment ( [facility]) with OP services     Care Team   Dr. Jessica Amezquita at [facility]     Barriers to Discharge   Ongoing stabilization      Referral Status  Referrals made to the following CD treatment programs  - [facility]- pt accepted 4/20  - [facility]- pt accepted 4/25  - [facility]- it is not pt's preference to discharge to [facility]      Legal Status  Voluntary

## 2022-04-22 NOTE — PROGRESS NOTES
[facility],  Psychiatric Progress Note      Impression:     Terrance Fletcher is a 31 year old male with history of methamphetamine abuse with associated psychosis who presents with mental health problem. EMS reported that patient woke up in the walk-in cooler of a [employer] where he works in [place]. On arrival Terrance believed that he was being chased. He then fled and was eventually contacted by EMS who transported him here. He dose not know how he came to [place] from [place]. Patient stated he was unsure if he used any substances. He kept glancing to the corner of the room and did not make make consistent eye contact but denied hallucinations during triage. Patient reported relapsing from Meth use and has a long history of substance abuse and CD treatments. He was recently discharged into a treatment facility and relapsed after discharging and going to a friends house. He currently denies suicidal ideations, denied visual or auditory hallucinations.         Diagnoses:     Psychosis Unspecified Psychosis type (H)  Substance-induced psychosis (methamphetamine, cannabis)  Likely component of malingering  History of major depressive disorder  Rule out antisocial personality disorder  Methamphetamine use disorder  Cannabis use disorder  Nicotine use disorder  Psoriasis         Plan:     Medications:  Continue taking Risperidone 1 mg in the morning  Continue taking Escitalopram (Lexapro) 20 mg daily for depression  Continue taking PRN Gabapentin as needed for anxiety  Continue taking Risperidone 2 mg at bedtime  Continue taking available PRN medications     Attestation:  Patient has been seen and evaluated by me,  ELVIRA CRUZ CNP  The patient was counseled on  nature of illness and treatment options            Interim History:   The patient's care was discussed with the treatment team and chart notes were reviewed. Records indicate 6.75 hours of sleep, attends  "groups.     Patient was agreeable to interview and stated he is in a good mood. He is ready to discharge on Monday and reports no immediate concerns. He denies having any questions regarding medications and states he is happy with the care he has received. Continues to sleep well and maintain a good appetite. Compliant with medications and agrees with the discharge plan for Monday. No additional concerns.      No change in medical status    The Review of Systems is negative other than noted in the HPI         Medications:     Current Facility-Administered Medications   Medication     acetaminophen (TYLENOL) tablet 650 mg     alum & mag hydroxide-simethicone (MAALOX) suspension 30 mL     clobetasol (TEMOVATE) 0.05 % cream     escitalopram (LEXAPRO) tablet 20 mg     gabapentin (NEURONTIN) capsule 100 mg     melatonin tablet 3 mg     nicotine (COMMIT) lozenge 2-4 mg     OLANZapine (zyPREXA) tablet 10 mg    Or     OLANZapine (zyPREXA) injection 10 mg     risperiDONE (risperDAL) tablet 1 mg     risperiDONE (risperDAL) tablet 2 mg     traZODone (DESYREL) tablet 50 mg             Allergies:   No Known Allergies         Psychiatric Examination:   /78   Pulse 93   Temp 98  F (36.7  C)   Resp 16   Ht 1.702 m (5' 7\")   Wt 94.5 kg (208 lb 6.4 oz)   SpO2 91%   BMI 32.64 kg/m    Weight is 208 lbs 6.4 oz  Body mass index is 32.64 kg/m .    Appearance:  awake, alert, adequately groomed and dressed in hospital scrubs  Attitude:  cooperative  Eye Contact:  good  Mood:  good  Affect:  appropriate and in normal range  Speech:  clear, coherent and normal prosody  Psychomotor Behavior:  no evidence of tardive dyskinesia, dystonia, or tics  Thought Process:  logical and goal oriented  Associations:  no loose associations  Thought Content:  no evidence of suicidal ideation or homicidal ideation, no auditory hallucinations present and no visual hallucinations present  Insight:  good  Judgment:  intact  Oriented to:  time, " person, and place  Attention Span and Concentration:  intact  Recent and Remote Memory:  intact  Language: Able to read and write  Fund of Knowledge: appropriate  Muscle Strength and Tone: normal  Gait and Station: Normal         Labs:   No results found for this or any previous visit (from the past 24 hour(s)).

## 2022-04-22 NOTE — PLAN OF CARE
"Patient present, visible in  milieu socializing with peers and attending groups. Presented with full range affect with calm mood. Denies all mental health symptoms, states that, \"I am doing okay\". Denies pain and discomfort during the shift. Medication compliant, no acute issues noted.   Plan: Status 15's and current POC.     Goal Outcome Evaluation:   Problem: Mood Impairment (Anxiety Signs/Symptoms)  Goal: Improved Mood Symptoms (Anxiety Signs/Symptoms)  Outcome: Ongoing, Progressing       Plan of Care Reviewed With: patient                 "

## 2022-04-22 NOTE — PROGRESS NOTES
NOC Shift Report    Pt in bed at beginning of shift, breathing quiet and unlabored. Pt slept 6.75 hours.   No pt complaints or concerns at this time.   No PRNs given. Pt on status 15 min checks. Will continue to monitor.

## 2022-04-23 PROCEDURE — 250N000013 HC RX MED GY IP 250 OP 250 PS 637

## 2022-04-23 PROCEDURE — 250N000013 HC RX MED GY IP 250 OP 250 PS 637: Performed by: PSYCHIATRY & NEUROLOGY

## 2022-04-23 PROCEDURE — 124N000002 HC R&B MH UMMC

## 2022-04-23 RX ADMIN — NICOTINE POLACRILEX 4 MG: 2 LOZENGE ORAL at 17:34

## 2022-04-23 RX ADMIN — ESCITALOPRAM OXALATE 20 MG: 20 TABLET ORAL at 08:50

## 2022-04-23 RX ADMIN — MELATONIN TAB 3 MG 3 MG: 3 TAB at 21:43

## 2022-04-23 RX ADMIN — RISPERIDONE 2 MG: 2 TABLET ORAL at 21:25

## 2022-04-23 RX ADMIN — RISPERIDONE 1 MG: 1 TABLET ORAL at 08:50

## 2022-04-23 ASSESSMENT — ACTIVITIES OF DAILY LIVING (ADL)
DRESS: INDEPENDENT
DRESS: INDEPENDENT
HYGIENE/GROOMING: INDEPENDENT
ORAL_HYGIENE: INDEPENDENT
HYGIENE/GROOMING: INDEPENDENT
LAUNDRY: WITH SUPERVISION

## 2022-04-23 NOTE — PROGRESS NOTES
Pt on status 15 min check. Pt appears to be sleeping 7 hrs.No other concerns. Will continue to monitor.

## 2022-04-23 NOTE — PLAN OF CARE
"Problem: Behavioral Health Plan of Care  Goal: Develops/Participates in Therapeutic Gray to Support Successful Transition  Intervention: Foster Therapeutic Gray  Recent Flowsheet Documentation  Taken 4/22/2022 2000 by Bradley Guillermo RN  Trust Relationship/Rapport:   care explained   choices provided   emotional support provided   empathic listening provided   questions answered   questions encouraged   reassurance provided   thoughts/feelings acknowledged   Goal Outcome Evaluation:    The patient was visible in the milieu but mostly kept to himself. He had a positive demeanor and denied all mental health symptoms other than anxiety, rated as 8/10, which he said \"could get out of hand,\" and requested Zyprexa PRN to help manage. The Zyprexa appeared to be effective. He ate his meals and snacks and took his scheduled medications, but he refused to attend a group therapy session and had a pleasant evening.    "

## 2022-04-23 NOTE — PROGRESS NOTES
"Patient was given 2 nicotine lozenges per patient request.  Patient put one in his mouth and this writer noticed he was carrying the 2nd lozenge around with him.  After watching him for a few minutes, finally asked him what he planned to do with the 2nd lozenge to which he responded \"keep it with me for later.\"  Writer instructed him that he had to put it in his mouth or give it back and request it later, he was reluctant, however, eventually acted like he put it in his mouth (another patient who has obtained these lozenges from peers was in the lounge at the time).  "

## 2022-04-23 NOTE — PLAN OF CARE
"Goal Outcome Evaluation:    Plan of Care Reviewed With: patient     Patient alert and oriented to person, place, and time, adequately groomed. Pt is pleasant, blunted, good eye contact, cooperative, medication compliant. No PRN's given. Pt on suicidal, assault, elopement, falls precautions, no behaviors observed. Pt contracts for safety. Pt isolated in room, observed in unit milieu during meals, appropriate with peers and staff. Pt communicates and verbalizes needs to staff. No behaviors noted. Will continue to monitor behavior and encourage engagement. No issues.    NURSING ASSESSMENT  Pain: denies  Anxiety: josr  Depression: denies  SI: denies  SIB: denies  HI: denies  AVH: denies, did not appear to be responding to internal stimuli, did not demonstrate delusional thinking.  Mood/Affect: blunted   Sleep: pt states \"good\"  Medication: compliant, no side effects reported or observed  PRN: none  Appetite: breakfast  100%    lunch 100%  ADLs: independent   Visits: none  Vitals: WNL   Medical: denies     Problem: Behavioral Health Plan of Care  Goal: Plan of Care Review  Outcome: Ongoing, Progressing  Flowsheets (Taken 4/23/2022 1254)  Plan of Care Reviewed With: patient  Patient Agreement with Plan of Care: agrees     Problem: Suicide Risk  Goal: Absence of Self-Harm  Outcome: Ongoing, Progressing     Problem: Behavioral Health Plan of Care  Goal: Adheres to Safety Considerations for Self and Others  Outcome: Ongoing, Progressing  Intervention: Develop and Maintain Individualized Safety Plan  Recent Flowsheet Documentation  Taken 4/23/2022 1254 by Agueda Giordano, RN  Safety Measures:   environmental rounds completed   safety rounds completed   suicide assessment completed   suicide check-in completed     Problem: Behavioral Health Plan of Care  Goal: Optimal Comfort and Wellbeing  Outcome: Ongoing, Progressing  Intervention: Provide Person-Centered Care  Recent Flowsheet Documentation  Taken 4/23/2022 1254 by " Agueda Giordano RN  Trust Relationship/Rapport:   care explained   choices provided   emotional support provided   empathic listening provided   thoughts/feelings acknowledged     Problem: Suicidal Behavior  Goal: Suicidal Behavior is Absent or Managed  Outcome: Ongoing, Progressing     Problem: Activity and Energy Impairment (Anxiety Signs/Symptoms)  Goal: Optimized Energy Level (Anxiety Signs/Symptoms)  Outcome: Ongoing, Progressing  Intervention: Optimize Energy Level  Recent Flowsheet Documentation  Taken 4/23/2022 1254 by Agueda Giordano RN  Patient Performed Hygiene: dressed  Activity (Behavioral Health): up ad jayna     Problem: Social, Occupational or Functional Impairment (Anxiety Signs/Symptoms)  Goal: Enhanced Social, Occupational or Functional Skills (Anxiety Signs/Symptoms)  Outcome: Ongoing, Progressing  Intervention: Promote Social, Occupational and Functional Ability  Recent Flowsheet Documentation  Taken 4/23/2022 1254 by Agueda Giordano RN  Trust Relationship/Rapport:   care explained   choices provided   emotional support provided   empathic listening provided   thoughts/feelings acknowledged     Problem: Mood Impairment (Depressive Signs/Symptoms)  Goal: Improved Mood Symptoms (Depressive Signs/Symptoms)  Outcome: Ongoing, Progressing     Problem: Suicidal Behavior  Goal: Suicidal Behavior is Absent or Managed  Outcome: Ongoing, Progressing

## 2022-04-24 PROCEDURE — 250N000013 HC RX MED GY IP 250 OP 250 PS 637: Performed by: PSYCHIATRY & NEUROLOGY

## 2022-04-24 PROCEDURE — 250N000013 HC RX MED GY IP 250 OP 250 PS 637

## 2022-04-24 PROCEDURE — 124N000002 HC R&B MH UMMC

## 2022-04-24 RX ADMIN — NICOTINE POLACRILEX 2 MG: 2 LOZENGE ORAL at 18:21

## 2022-04-24 RX ADMIN — RISPERIDONE 1 MG: 1 TABLET ORAL at 08:38

## 2022-04-24 RX ADMIN — MELATONIN TAB 3 MG 3 MG: 3 TAB at 20:30

## 2022-04-24 RX ADMIN — RISPERIDONE 2 MG: 2 TABLET ORAL at 20:25

## 2022-04-24 RX ADMIN — ESCITALOPRAM OXALATE 20 MG: 20 TABLET ORAL at 08:38

## 2022-04-24 ASSESSMENT — ACTIVITIES OF DAILY LIVING (ADL)
DRESS: INDEPENDENT
HYGIENE/GROOMING: INDEPENDENT
ORAL_HYGIENE: INDEPENDENT

## 2022-04-24 NOTE — PLAN OF CARE
"Goal Outcome Evaluation:    Plan of Care Reviewed With: patient      Patient spent about 50 % of the shift in the lounge, selectively social with peers playing cards and the other half of the shift bed resting.  Self reports his mood as \"better each day closer to discharge\".      Denies SI/SIB/HI and AVH.      Requested and received PRN Melatonin at HS with scheduled med.             "

## 2022-04-24 NOTE — PROGRESS NOTES
Pt is on status 15 min check. Pt appears to be sleeping the whole night. No other concerns. Will continue to monitor.

## 2022-04-24 NOTE — PLAN OF CARE
"Goal Outcome Evaluation:    Plan of Care Reviewed With: patient     Patient alert and oriented to person, place, and time, adequately groomed. Pt is pleasant, good eye contact, cooperative, medication compliant. Pt did not request any nicotine lozenges this shift, pt may pocket a lozenge if given two at a time. Only give one if requested and make sure he is putting in his mouth not handing off to other patients. No PRN's given. Pt on suicidal, assault, elopement, falls precautions, no behaviors observed. Pt contracts for safety. Pt engaged in unit milieu watching movies with peers, expressed feeling \"good\" about up coming discharge, appropriate with peers and staff. Pt communicates needs to staff. No behaviors noted. Will continue to monitor behavior and encourage engagement.     NURSING ASSESSMENT  Pain: denies  Anxiety: denies  Depression: denies  SI: denies  SIB: denies  HI: denies  AVH: denies, did not appear to be responding to internal stimuli, did not demonstrate delusional thinking.  Mood/Affect: full range affect  Medication: compliant, no side effects reported or observed  PRN: none  Appetite: breakfast  100%    lunch 100%  ADLs: independent   Visits: none  Vitals: WNL   Medical: denies     Problem: Behavioral Health Plan of Care  Goal: Plan of Care Review  Outcome: Ongoing, Progressing  Flowsheets (Taken 4/24/2022 1400)  Plan of Care Reviewed With: patient  Patient Agreement with Plan of Care: agrees     Problem: Suicide Risk  Goal: Absence of Self-Harm  Outcome: Ongoing, Progressing     Problem: Behavioral Health Plan of Care  Goal: Adheres to Safety Considerations for Self and Others  Outcome: Ongoing, Progressing  Intervention: Develop and Maintain Individualized Safety Plan  Recent Flowsheet Documentation  Taken 4/24/2022 1400 by Agueda Giordano, RN  Safety Measures:   environmental rounds completed   safety rounds completed   suicide assessment completed   suicide check-in completed     Problem: " Behavioral Health Plan of Care  Goal: Optimal Comfort and Wellbeing  Outcome: Ongoing, Progressing  Intervention: Provide Person-Centered Care  Recent Flowsheet Documentation  Taken 4/24/2022 1400 by Agueda Giordano RN  Trust Relationship/Rapport:   care explained   choices provided   emotional support provided   empathic listening provided   questions answered   questions encouraged   thoughts/feelings acknowledged   reassurance provided     Problem: Suicidal Behavior  Goal: Suicidal Behavior is Absent or Managed  Outcome: Ongoing, Progressing     Problem: Mood Impairment (Anxiety Signs/Symptoms)  Goal: Improved Mood Symptoms (Anxiety Signs/Symptoms)  Outcome: Ongoing, Progressing     Problem: Social, Occupational or Functional Impairment (Anxiety Signs/Symptoms)  Goal: Enhanced Social, Occupational or Functional Skills (Anxiety Signs/Symptoms)  Outcome: Ongoing, Progressing  Intervention: Promote Social, Occupational and Functional Ability  Recent Flowsheet Documentation  Taken 4/24/2022 1400 by Agueda Giordano, RN  Trust Relationship/Rapport:   care explained   choices provided   emotional support provided   empathic listening provided   questions answered   questions encouraged   thoughts/feelings acknowledged   reassurance provided     Problem: Decreased Participation/Engagement (Depressive Signs/Symptoms)  Goal: Increased Participation and Engagement (Depressive Signs/Symptoms)  Outcome: Ongoing, Progressing

## 2022-04-25 VITALS
OXYGEN SATURATION: 96 % | HEART RATE: 86 BPM | BODY MASS INDEX: 33.29 KG/M2 | RESPIRATION RATE: 16 BRPM | SYSTOLIC BLOOD PRESSURE: 108 MMHG | WEIGHT: 212.1 LBS | TEMPERATURE: 97.2 F | HEIGHT: 67 IN | DIASTOLIC BLOOD PRESSURE: 75 MMHG

## 2022-04-25 PROCEDURE — 250N000013 HC RX MED GY IP 250 OP 250 PS 637

## 2022-04-25 PROCEDURE — 99239 HOSP IP/OBS DSCHRG MGMT >30: CPT

## 2022-04-25 RX ADMIN — ESCITALOPRAM OXALATE 20 MG: 20 TABLET ORAL at 08:16

## 2022-04-25 RX ADMIN — RISPERIDONE 1 MG: 1 TABLET ORAL at 08:16

## 2022-04-25 NOTE — PROGRESS NOTES
Psychiatric Discharge Summary    Terrance Fletcher MRN# [id number]   Age: 31 year old YOB: 1990     Date of Admission:  4/11/2022  Date of Discharge:  4/25/2022  Admitting Physician:  Margarita Lanza MD  Discharge Physician:  ELVIRA CRUZ CNP (Contact: [phone])         Event Leading to Hospitalization:     Terrance Fletcher is a 31 year old male with history of methamphetamine abuse with associated psychosis who presents with mental health problem. EMS reported that patient woke up in the walk-in cooler of a [employer] where he works in [place]. On arrival Terrance believed that he was being chased. He then fled and was eventually contacted by EMS who transported him here. He dose not know how he came to [place] from [place]. Patient stated he was unsure if he used any substances. He kept glancing to the corner of the room and did not make make consistent eye contact but denied hallucinations during triage. Patient reported relapsing from Meth use and has a long history of substance abuse and CD treatments. He was recently discharged into a treatment facility and relapsed after discharging and going to a friends house. He currently denies suicidal ideations, denied visual or auditory hallucinations.         Diagnoses:     Psychosis Unspecified Psychosis type (H)  Substance-induced psychosis (methamphetamine, cannabis)  Likely component of malingering  History of major depressive disorder  Rule out antisocial personality disorder  Methamphetamine use disorder  Cannabis use disorder  Nicotine use disorder  Psoriasis         Labs:        Latest Reference Range & Units 04/10/22 05:16 04/10/22 20:47 04/12/22 06:42 04/19/22 10:03   Sodium 133 - 144 mmol/L 136  140    Potassium 3.4 - 5.3 mmol/L 4.3  4.0    Chloride 94 - 109 mmol/L 106  110 (H)    Carbon Dioxide 20 - 32 mmol/L 27  23    Urea Nitrogen 7 - 30 mg/dL 16  12    Creatinine 0.66 - 1.25 mg/dL 1.00  1.01    GFR Estimate >60 mL/min/1.73m2 >90  [1]  >90 [2]    Calcium 8.5 - 10.1 mg/dL 9.2  9.0    Anion Gap 3 - 14 mmol/L 3  7    Albumin 3.4 - 5.0 g/dL 3.8  3.4    Protein Total 6.8 - 8.8 g/dL 7.1  6.8    Bilirubin Total 0.2 - 1.3 mg/dL 0.7  0.3    Alkaline Phosphatase 40 - 150 U/L 65  66    ALT 0 - 70 U/L 40  26    AST 0 - 45 U/L 40  13    Cholesterol <200 mg/dL   157    GGT 0 - 75 U/L   12    HDL Cholesterol >=40 mg/dL   29 (L)    LDL Cholesterol Calculated <=100 mg/dL   96    Non HDL Cholesterol <130 mg/dL   128    T4 Free 0.76 - 1.46 ng/dL   1.12    Triglycerides <150 mg/dL   160 (H)    TSH 0.40 - 4.00 mU/L   0.14 (L)    Glucose 70 - 99 mg/dL 82  86    WBC 4.0 - 11.0 10e3/uL 11.4 (H)  7.8    Hemoglobin 13.3 - 17.7 g/dL 15.4  15.8    Hematocrit 40.0 - 53.0 % 47.6  47.7    Platelet Count 150 - 450 10e3/uL 245  235    RBC Count 4.40 - 5.90 10e6/uL 5.56  5.56    MCV 78 - 100 fL 86  86    MCH 26.5 - 33.0 pg 27.7  28.4    MCHC 31.5 - 36.5 g/dL 32.4  33.1    RDW 10.0 - 15.0 % 13.2  13.2    % Neutrophils % 67  52    % Lymphocytes % 18  32    % Monocytes % 11  9    % Eosinophils % 3  6    % Basophils % 1  1    Absolute Basophils 0.0 - 0.2 10e3/uL 0.1  0.1    Absolute Eosinophils 0.0 - 0.7 10e3/uL 0.3  0.5    Absolute Immature Granulocytes <=0.4 10e3/uL 0.1  0.0    Absolute Lymphocytes 0.8 - 5.3 10e3/uL 2.1  2.5    Absolute Monocytes 0.0 - 1.3 10e3/uL 1.3  0.7    % Immature Granulocytes % 0  0    Absolute Neutrophils 1.6 - 8.3 10e3/uL 7.7  4.0    Absolute NRBCs 10e3/uL 0.0  0.0    NRBCs per 100 WBC <1 /100 0  0    SARS CoV2 PCR Negative   Negative [3]  Negative [4]            Consults:   No consultations were requested during this admission         Hospital Course:     Terrance Fletcher was admitted to [facility] [address] with attending Michoacano Archer MD as a voluntary patient. The patient was placed under status 15 (15 minute checks) to ensure patient safety.   CBC, BMP and utox obtained.on admission and was positive for Amphetamines, Benzodiazepines and Cannabis.  "    All outpatient medications was restarted and continued. Continued Lexapro 20 mg daily, Risperidone 2 mg at bedtime, started Gabapentin 100 mg PRN TID, started Risperidone 1 mg in the morning    Terrance Fletcher participated in groups and was visible in the milieu. Nursing notes and labs were reviewed.  /75 (BP Location: Right arm)   Pulse 86   Temp 97.2  F (36.2  C) (Oral)   Resp 16   Ht 1.702 m (5' 7\")   Wt 96.2 kg (212 lb 1.6 oz)   SpO2 96%   BMI 33.22 kg/m        The patient's symptoms of drug induced Psychosis, anxiety and depression have improved. He denied suicidal ideations, denied visual or auditory hallucinations, denied feeling depressed and is in a good mood. He has been sleeping well averaging 7 hrs a night.     Terrance Fletcher requested to be discharged to to [facility] on 4/25/2022 instead of [facility] for chemical dependency. The treatment plan was verified by Terrance and at this time of discharge Terrance Fletcher was determined to not be a danger to himself or others.          Discharge Medications:     Current Discharge Medication List      START taking these medications    Details   alum & mag hydroxide-simethicone (MAALOX) 200-200-20 MG/5ML SUSP suspension Take 30 mLs by mouth every 4 hours as needed for indigestion  Qty: 600 mL, Refills: 0    Associated Diagnoses: Gastroesophageal reflux disease without esophagitis      clobetasol (TEMOVATE) 0.05 % external cream Apply topically 2 times daily as needed (irritation)  Qty: 60 g, Refills: 1    Associated Diagnoses: Psoriasis      gabapentin (NEURONTIN) 100 MG capsule Take 1 capsule (100 mg) by mouth 3 times daily as needed for other (Anxiety)  Qty: 30 capsule, Refills: 1    Associated Diagnoses: RAKESH (generalized anxiety disorder)      melatonin 3 MG tablet Take 1 tablet (3 mg) by mouth nightly as needed for sleep  Qty: 30 tablet, Refills: 1    Associated Diagnoses: Insomnia, unspecified type      OLANZapine (ZYPREXA) 10 MG tablet Take 1 " tablet (10 mg) by mouth 3 times daily as needed (associated with psychosis or sherrell)  Qty: 30 tablet, Refills: 1    Associated Diagnoses: Psychosis, unspecified psychosis type (H)      !! risperiDONE (RISPERDAL) 1 MG tablet Take 1 tablet (1 mg) by mouth daily  Qty: 30 tablet, Refills: 1    Associated Diagnoses: Paranoia (H)      !! risperiDONE (RISPERDAL) 2 MG tablet Take 1 tablet (2 mg) by mouth At Bedtime  Qty: 30 tablet, Refills: 1    Associated Diagnoses: Paranoia (H)       !! - Potential duplicate medications found. Please discuss with provider.      CONTINUE these medications which have NOT CHANGED    Details   escitalopram (LEXAPRO) 20 MG tablet Take 1 tablet (20 mg) by mouth daily  Qty: 30 tablet, Refills: 1    Associated Diagnoses: Severe episode of recurrent major depressive disorder, with psychotic features (H)                  Psychiatric Examination:   Appearance:  awake, alert, adequately groomed and dressed in hospital scrubs  Attitude:  cooperative  Eye Contact:  good  Mood:  anxious  Affect:  appropriate and in normal range  Speech:  clear, coherent and normal prosody  Psychomotor Behavior:  no evidence of tardive dyskinesia, dystonia, or tics  Thought Process:  logical and goal oriented  Associations:  no loose associations  Thought Content:  no evidence of suicidal ideation or homicidal ideation, no auditory hallucinations present and no visual hallucinations present  Insight:  good  Judgment:  intact  Oriented to:  time, person, and place  Attention Span and Concentration:  intact  Recent and Remote Memory:  intact  Language: Able to name objects, Able to repeat phrases and Able to read and write  Fund of Knowledge: appropriate  Muscle Strength and Tone: normal  Gait and Station: Normal         Discharge Plan:     Per Discharge AVS    Behavioral Discharge Planning and Instructions     Summary: You were admitted on 4/11/2022  due to use of substance use and later transported to the ED via EMS. You  were treated by Vero Mauro NP and discharged on 04/25/2022  from [facility]  to [facility] treatment located [address] [place] [address]     Main Diagnosis:   Substance-induced psychosis (methamphetamine, cannabis)  History of major depressive disorder  Methamphetamine use disorder  Cannabis use disorder  Nicotine use disorder  Psoriasis     Health Care Follow-up: Attend all scheduled appointments with your outpatient providers. Call at least 24 hours in advance if you need to reschedule an appointment to ensure continued access to your outpatient providers.      Psychiatry: [facility] 05/23/2022 1:00PM virtual Dr. Jazzmine Noonan   Address: [address] [place], [place] [id number]  Phone: [phone]  [phone]  Dr. Noonan will email you a link to join your meeting on the same day of your appointment. Sometimes the link gets sent to spam mail so please be sure to check your spam mail as well.      Therapy: [facility]63/16/2022 10:00 AM virtualHeidi Gibson  Address: [address] [place] [address]  Phone: [phone]  [phone]  Sybil will email you a link to join your meeting on the same day of your appointment. Sometimes the link gets sent to spam mail so please be sure to check your spam mail as well.      Attestation:  The patient has been seen and evaluated by me,  ELVIRA CRUZ CNP     Discharge Time > 30 minutes

## 2022-04-25 NOTE — PROGRESS NOTES
NOC Shift Report    Pt in bed at beginning of shift, breathing quiet and unlabored. Pt slept 7 hours. No pt complaints or concerns at this time. No PRNs given.

## 2022-04-25 NOTE — PLAN OF CARE
Problem: Behavioral Health Plan of Care  Goal: Plan of Care Review  Outcome: Ongoing, Progressing  Flowsheets (Taken 4/24/2022 1808)  Plan of Care Reviewed With: patient  Patient Agreement with Plan of Care: agrees   Goal Outcome Evaluation:    Plan of Care Reviewed With: patient     Early in the evening, Pt was seen on the unit. He was social with his peers, watched TV, and went in and out of his room at will. SI and all other Psych SxS were denied by the patient. He was in a relaxed mood with a wide range of emotions. Pt is looking forward to being discharged tomorrow. Pt took his medicines as prescribed and reported no side effects. This evening, he was given one nicotine lozenge.

## 2022-09-01 ENCOUNTER — HOSPITAL ENCOUNTER (EMERGENCY)
Facility: CLINIC | Age: 32
Discharge: HOME | End: 2022-09-02
Payer: COMMERCIAL

## 2022-09-02 NOTE — ED NOTES
Pt pacing in the lobby and when asked to come to triage room to be triage, pt walked into the bathroom and stated that writer should come inside the BR and he was going to lock the door and tell writer something. Pt went into the BR and calling out saying that he is locked inside. Asked security to assist.

## 2024-06-03 ENCOUNTER — OFFICE VISIT (OUTPATIENT)
Dept: FAMILY MEDICINE | Facility: CLINIC | Age: 34
End: 2024-06-03
Payer: COMMERCIAL

## 2024-06-03 VITALS
OXYGEN SATURATION: 98 % | SYSTOLIC BLOOD PRESSURE: 156 MMHG | TEMPERATURE: 98 F | DIASTOLIC BLOOD PRESSURE: 92 MMHG | HEART RATE: 97 BPM

## 2024-06-03 DIAGNOSIS — B36.9 OTOMYCOSIS OF RIGHT EAR: Primary | ICD-10-CM

## 2024-06-03 DIAGNOSIS — H66.91 RIGHT ACUTE OTITIS MEDIA: ICD-10-CM

## 2024-06-03 DIAGNOSIS — H62.41 OTOMYCOSIS OF RIGHT EAR: Primary | ICD-10-CM

## 2024-06-03 PROBLEM — N20.1 LEFT URETERAL STONE: Status: ACTIVE | Noted: 2023-10-29

## 2024-06-03 PROBLEM — F15.20 METHAMPHETAMINE USE DISORDER, MODERATE (H): Status: ACTIVE | Noted: 2021-06-28

## 2024-06-03 PROBLEM — F39 EPISODIC MOOD DISORDER (H): Status: ACTIVE | Noted: 2023-10-24

## 2024-06-03 PROBLEM — L40.9 PSORIASIS: Status: ACTIVE | Noted: 2023-10-24

## 2024-06-03 PROBLEM — E66.9 OBESITY: Status: ACTIVE | Noted: 2023-10-24

## 2024-06-03 PROCEDURE — 99213 OFFICE O/P EST LOW 20 MIN: CPT

## 2024-06-03 RX ORDER — ACETIC ACID 20.65 MG/ML
4 SOLUTION AURICULAR (OTIC) 3 TIMES DAILY
Qty: 15 ML | Refills: 0 | Status: SHIPPED | OUTPATIENT
Start: 2024-06-03 | End: 2024-06-10

## 2024-06-03 RX ORDER — PENICILLIN V POTASSIUM 500 MG/1
500 TABLET, FILM COATED ORAL 2 TIMES DAILY
Qty: 20 TABLET | Refills: 0 | Status: SHIPPED | OUTPATIENT
Start: 2024-06-03 | End: 2024-06-13

## 2024-06-03 ASSESSMENT — ENCOUNTER SYMPTOMS
CONSTITUTIONAL NEGATIVE: 1
CARDIOVASCULAR NEGATIVE: 1
RESPIRATORY NEGATIVE: 1

## 2024-06-03 NOTE — PROGRESS NOTES
Assessment & Plan       ICD-10-CM    1. Otomycosis of right ear  B36.9 acetic acid (VOSOL) 2 % otic solution    H62.41       2. Right acute otitis media  H66.91 penicillin V (VEETID) 500 MG tablet           Take antibiotic as directed. Keep ears dry. Increase fluids with water, Pedialyte, Gatorade, or rehydrating beverages. Alternate Tylenol and Ibuprofen as needed for aches, pains or fever. Follow up in clinic if symptoms persist or worsen.       Follow up with primary care provider with any problems, questions or concerns or if symptoms worsen or fail to improve. Patient agreed to plan and verbalized understanding.     Subjective     Terrance is a 34 year old male who presents to clinic today for the following health issues:  Chief Complaint   Patient presents with    Urgent Care    Ear Problem     Right ear drainage and pain. Possible ear infection. Pt used an old abx ear drops 2x yesterday.     Terrance presents with reports of fluid in ear, pain in the ear.             Review of Systems   Constitutional: Negative.    HENT:  Positive for ear discharge and ear pain.    Respiratory: Negative.     Cardiovascular: Negative.        Problem List:  2022-04: Paranoia (H)  2021-10: Methamphetamine abuse (H)  2021-10: Drug-induced mood disorder (H)  2021-06: Severe episode of recurrent major depressive disorder, with   psychotic features (H)  2021-06: RAKESH (generalized anxiety disorder)  2021-06: Homeless  2021-06: Methamphetamine use disorder, severe, in controlled   environment (H)  2018-10: Psychosis - while on methamphetamine  2011-10: Antisocial personality disorder (H)      Past Medical History:   Diagnosis Date    Antisocial personality disorder     Depressive disorder     h/o Psychosis & Hallucinations     Polysubstance abuse     Psoriasis        Social History     Tobacco Use    Smoking status: Every Day     Types: Cigarettes    Smokeless tobacco: Former   Substance Use Topics    Alcohol use: Not Currently            Objective    BP (!) 156/92   Pulse 97   Temp 98  F (36.7  C) (Tympanic)   SpO2 98%   Physical Exam  Constitutional:       Appearance: Normal appearance.   HENT:      Head: Normocephalic and atraumatic.      Right Ear: Drainage present. A middle ear effusion is present. Tympanic membrane is erythematous.      Left Ear: Hearing, tympanic membrane, ear canal and external ear normal.   Musculoskeletal:         General: Normal range of motion.      Cervical back: Normal range of motion and neck supple.   Skin:     General: Skin is warm and dry.   Neurological:      General: No focal deficit present.      Mental Status: He is alert and oriented to person, place, and time.   Psychiatric:         Mood and Affect: Mood normal.         Behavior: Behavior normal.         Thought Content: Thought content normal.         Judgment: Judgment normal.              Ramesh Hernandez PA-C

## 2024-06-03 NOTE — LETTER
Ana 3, 2024      Terrance Fletcher  [address] [address]        To Whom It May Concern:    Terrance Fletcher  was seen on 6/3/24.  Please excuse him  until 6/4/24 due to illness.        Sincerely,      Ramesh Hernandez PA-C   [facility]
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
